# Patient Record
Sex: FEMALE | Race: WHITE | HISPANIC OR LATINO
[De-identification: names, ages, dates, MRNs, and addresses within clinical notes are randomized per-mention and may not be internally consistent; named-entity substitution may affect disease eponyms.]

---

## 2019-06-10 ENCOUNTER — RESULT REVIEW (OUTPATIENT)
Age: 64
End: 2019-06-10

## 2019-06-18 ENCOUNTER — APPOINTMENT (OUTPATIENT)
Dept: PULMONOLOGY | Facility: CLINIC | Age: 64
End: 2019-06-18

## 2019-07-09 ENCOUNTER — APPOINTMENT (OUTPATIENT)
Dept: PULMONOLOGY | Facility: CLINIC | Age: 64
End: 2019-07-09
Payer: COMMERCIAL

## 2019-07-09 ENCOUNTER — NON-APPOINTMENT (OUTPATIENT)
Age: 64
End: 2019-07-09

## 2019-07-09 VITALS
SYSTOLIC BLOOD PRESSURE: 142 MMHG | WEIGHT: 170 LBS | RESPIRATION RATE: 14 BRPM | DIASTOLIC BLOOD PRESSURE: 89 MMHG | BODY MASS INDEX: 31.28 KG/M2 | TEMPERATURE: 98.1 F | OXYGEN SATURATION: 97 % | HEIGHT: 62 IN | HEART RATE: 94 BPM

## 2019-07-09 PROCEDURE — 94060 EVALUATION OF WHEEZING: CPT

## 2019-07-09 PROCEDURE — 99204 OFFICE O/P NEW MOD 45 MIN: CPT | Mod: 25

## 2019-07-09 NOTE — PHYSICAL EXAM
[General Appearance - Well Developed] : well developed [Normal Appearance] : normal appearance [Well Groomed] : well groomed [General Appearance - Well Nourished] : well nourished [No Deformities] : no deformities [General Appearance - In No Acute Distress] : no acute distress [Normal Conjunctiva] : the conjunctiva exhibited no abnormalities [Eyelids - No Xanthelasma] : the eyelids demonstrated no xanthelasmas [Normal Oropharynx] : normal oropharynx [III] : III [Neck Appearance] : the appearance of the neck was normal [Jugular Venous Distention Increased] : there was no jugular-venous distention [Heart Sounds] : normal S1 and S2 [Edema] : no peripheral edema present [Exaggerated Use Of Accessory Muscles For Inspiration] : no accessory muscle use [Scattered Wheezes] : scattered wheezing [Bowel Sounds] : normal bowel sounds [Abdomen Soft] : soft [Abnormal Walk] : normal gait [Nail Clubbing] : no clubbing of the fingernails [Cyanosis, Localized] : no localized cyanosis [] : no rash [No Focal Deficits] : no focal deficits [Oriented To Time, Place, And Person] : oriented to person, place, and time [Impaired Insight] : insight and judgment were intact [Affect] : the affect was normal [Mood] : the mood was normal

## 2019-07-09 NOTE — HISTORY OF PRESENT ILLNESS
[FreeTextEntry1] : Patient is a 64-year-old female with past medical history significant for questionable asthma who is referred today for a pulmonary consult. 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 She presents today complaining of increasing cough shortness of breath and dyspnea on exertion. The patient is currently on Symbicort but is using it inappropriately. She complains of worsening cough over the last few months

## 2019-07-09 NOTE — REASON FOR VISIT
[Consultation] : a consultation visit [Asthma] : asthma [Cough] : cough [Shortness of Breath] : shortness of Breath [Sleep Apnea] : sleep apnea [Wheezing] : wheezing [Spouse] : spouse [FreeTextEntry1] : Patient is referred by Dr. aNpoles  for pulmonary consult

## 2019-07-09 NOTE — REVIEW OF SYSTEMS
[Cough] : cough [Sputum] : sputum  [Hemoptysis] : no hemoptysis [Dyspnea] : dyspnea [Chest Tightness] : no chest tightness [Pleuritic Pain] : no pleuritic pain [Frequent URIs] : no frequent upper respiratory infections [Wheezing] : wheezing [Snoring] : snoring [Witnessed Apneas] : demonstrated no ~M apnea [Nonrestorative Sleep] : nonrestorative sleep [Awakes With Headache] : awakes with a headache [Awakes With Dry Mouth] : awakes with dry mouth [Negative] : Pulmonary Hypertension

## 2019-07-09 NOTE — ASSESSMENT
[FreeTextEntry1] : In summary the patient is a 64-year-old female who presents today for a pulmonary consult. The patient's history and physical are consistent with obstructive sleep apnea and poorly controlled asthma. I have instructed patient on proper use of her long-acting beta agonist and inhaled corticosteroid.\par \par The patient underwent spirometry which revealed severe obstructive airways disease.\par \par In view of the patient's complaint of snoring, nonrestorative sleep and awakening with morning headaches a home sleep study has been ordered and the patient is instructed to followup in 6 weeks

## 2019-07-11 LAB — EOSINOPHIL # BLD MANUAL: 370 /UL

## 2019-07-22 LAB — TOTAL IGE SMQN RAST: 723 KU/L

## 2019-08-20 ENCOUNTER — APPOINTMENT (OUTPATIENT)
Dept: PULMONOLOGY | Facility: CLINIC | Age: 64
End: 2019-08-20
Payer: COMMERCIAL

## 2019-08-20 VITALS
OXYGEN SATURATION: 97 % | DIASTOLIC BLOOD PRESSURE: 86 MMHG | RESPIRATION RATE: 14 BRPM | BODY MASS INDEX: 31.83 KG/M2 | HEIGHT: 62 IN | SYSTOLIC BLOOD PRESSURE: 139 MMHG | WEIGHT: 173 LBS | HEART RATE: 106 BPM | TEMPERATURE: 98.1 F

## 2019-08-20 DIAGNOSIS — Z00.00 ENCOUNTER FOR GENERAL ADULT MEDICAL EXAMINATION W/OUT ABNORMAL FINDINGS: ICD-10-CM

## 2019-08-20 PROCEDURE — 99214 OFFICE O/P EST MOD 30 MIN: CPT

## 2019-08-20 RX ORDER — FLUTICASONE PROPIONATE AND SALMETEROL 250; 50 UG/1; UG/1
250-50 POWDER RESPIRATORY (INHALATION)
Qty: 60 | Refills: 0 | Status: ACTIVE | COMMUNITY
Start: 2019-05-15

## 2019-08-20 NOTE — HISTORY OF PRESENT ILLNESS
[FreeTextEntry1] : Patient is a 64-year-old female past medical history significant for active asthma who presents for a followup. The patient states that her cough shortness of breath and dyspnea on exertion have improved significantly with the use of her bronchodilator therapy. She denies fevers chills chest pain weight loss or hemoptysis

## 2019-08-20 NOTE — ASSESSMENT
[FreeTextEntry1] : In summary the patient is a 64-year-old female with past medical history significant for allergic asthma who presents for followup. The patient physical exam is significant for good air entry bilaterally. The patient is instructed to continue current medications and followup in 3 months.\par \par Prescription renewal performed

## 2019-08-20 NOTE — PHYSICAL EXAM
[General Appearance - Well Developed] : well developed [Normal Appearance] : normal appearance [General Appearance - Well Nourished] : well nourished [Well Groomed] : well groomed [No Deformities] : no deformities [General Appearance - In No Acute Distress] : no acute distress [Normal Conjunctiva] : the conjunctiva exhibited no abnormalities [Eyelids - No Xanthelasma] : the eyelids demonstrated no xanthelasmas [Normal Oropharynx] : normal oropharynx [III] : III [Neck Appearance] : the appearance of the neck was normal [Jugular Venous Distention Increased] : there was no jugular-venous distention [Heart Sounds] : normal S1 and S2 [Edema] : no peripheral edema present [Exaggerated Use Of Accessory Muscles For Inspiration] : no accessory muscle use [Auscultation Breath Sounds / Voice Sounds] : lungs were clear to auscultation bilaterally [Bowel Sounds] : normal bowel sounds [Abnormal Walk] : normal gait [Abdomen Soft] : soft [Cyanosis, Localized] : no localized cyanosis [Nail Clubbing] : no clubbing of the fingernails [] : no rash [No Focal Deficits] : no focal deficits [Oriented To Time, Place, And Person] : oriented to person, place, and time [Impaired Insight] : insight and judgment were intact [Affect] : the affect was normal [Mood] : the mood was normal

## 2019-08-20 NOTE — REASON FOR VISIT
[Follow-Up] : a follow-up visit [Asthma] : asthma [Shortness of Breath] : shortness of Breath [Cough] : cough

## 2020-08-24 ENCOUNTER — TRANSCRIPTION ENCOUNTER (OUTPATIENT)
Age: 65
End: 2020-08-24

## 2020-08-25 ENCOUNTER — INPATIENT (INPATIENT)
Facility: HOSPITAL | Age: 65
LOS: 2 days | Discharge: HOME | DRG: 661 | End: 2020-08-28
Attending: UROLOGY | Admitting: UROLOGY
Payer: COMMERCIAL

## 2020-08-25 VITALS
DIASTOLIC BLOOD PRESSURE: 87 MMHG | TEMPERATURE: 99 F | HEART RATE: 94 BPM | RESPIRATION RATE: 20 BRPM | OXYGEN SATURATION: 99 % | SYSTOLIC BLOOD PRESSURE: 137 MMHG | HEIGHT: 65 IN

## 2020-08-25 DIAGNOSIS — N23 UNSPECIFIED RENAL COLIC: ICD-10-CM

## 2020-08-25 LAB
ALBUMIN SERPL ELPH-MCNC: 3.4 G/DL — LOW (ref 3.5–5)
ALLERGY+IMMUNOLOGY DIAG STUDY NOTE: SIGNIFICANT CHANGE UP
ALP SERPL-CCNC: 92 U/L — SIGNIFICANT CHANGE UP (ref 40–120)
ALT FLD-CCNC: 30 U/L DA — SIGNIFICANT CHANGE UP (ref 10–60)
ANION GAP SERPL CALC-SCNC: 9 MMOL/L — SIGNIFICANT CHANGE UP (ref 5–17)
ANION GAP SERPL CALC-SCNC: 9 MMOL/L — SIGNIFICANT CHANGE UP (ref 5–17)
ANTIBODY INTERPRETATION 2: SIGNIFICANT CHANGE UP
APPEARANCE UR: CLEAR — SIGNIFICANT CHANGE UP
APTT BLD: 28.2 SEC — SIGNIFICANT CHANGE UP (ref 27.5–35.5)
AST SERPL-CCNC: 26 U/L — SIGNIFICANT CHANGE UP (ref 10–40)
BACTERIA # UR AUTO: ABNORMAL /HPF
BASOPHILS # BLD AUTO: 0.03 K/UL — SIGNIFICANT CHANGE UP (ref 0–0.2)
BASOPHILS NFR BLD AUTO: 0.2 % — SIGNIFICANT CHANGE UP (ref 0–2)
BILIRUB SERPL-MCNC: 0.4 MG/DL — SIGNIFICANT CHANGE UP (ref 0.2–1.2)
BILIRUB UR-MCNC: NEGATIVE — SIGNIFICANT CHANGE UP
BLD GP AB SCN SERPL QL: SIGNIFICANT CHANGE UP
BUN SERPL-MCNC: 41 MG/DL — HIGH (ref 7–18)
BUN SERPL-MCNC: 43 MG/DL — HIGH (ref 7–18)
CALCIUM SERPL-MCNC: 8.8 MG/DL — SIGNIFICANT CHANGE UP (ref 8.4–10.5)
CALCIUM SERPL-MCNC: 9.2 MG/DL — SIGNIFICANT CHANGE UP (ref 8.4–10.5)
CHLORIDE SERPL-SCNC: 101 MMOL/L — SIGNIFICANT CHANGE UP (ref 96–108)
CHLORIDE SERPL-SCNC: 99 MMOL/L — SIGNIFICANT CHANGE UP (ref 96–108)
CO2 SERPL-SCNC: 22 MMOL/L — SIGNIFICANT CHANGE UP (ref 22–31)
CO2 SERPL-SCNC: 23 MMOL/L — SIGNIFICANT CHANGE UP (ref 22–31)
COLOR SPEC: YELLOW — SIGNIFICANT CHANGE UP
CREAT SERPL-MCNC: 7.72 MG/DL — HIGH (ref 0.5–1.3)
CREAT SERPL-MCNC: 7.73 MG/DL — HIGH (ref 0.5–1.3)
DIFF PNL FLD: ABNORMAL
EOSINOPHIL # BLD AUTO: 0.08 K/UL — SIGNIFICANT CHANGE UP (ref 0–0.5)
EOSINOPHIL NFR BLD AUTO: 0.6 % — SIGNIFICANT CHANGE UP (ref 0–6)
EPI CELLS # UR: ABNORMAL /HPF
GLUCOSE SERPL-MCNC: 84 MG/DL — SIGNIFICANT CHANGE UP (ref 70–99)
GLUCOSE SERPL-MCNC: 86 MG/DL — SIGNIFICANT CHANGE UP (ref 70–99)
GLUCOSE UR QL: NEGATIVE — SIGNIFICANT CHANGE UP
HCT VFR BLD CALC: 40.1 % — SIGNIFICANT CHANGE UP (ref 34.5–45)
HGB BLD-MCNC: 13.5 G/DL — SIGNIFICANT CHANGE UP (ref 11.5–15.5)
IMM GRANULOCYTES NFR BLD AUTO: 0.4 % — SIGNIFICANT CHANGE UP (ref 0–1.5)
INR BLD: 1.02 RATIO — SIGNIFICANT CHANGE UP (ref 0.88–1.16)
KETONES UR-MCNC: NEGATIVE — SIGNIFICANT CHANGE UP
LEUKOCYTE ESTERASE UR-ACNC: ABNORMAL
LIDOCAIN IGE QN: 102 U/L — SIGNIFICANT CHANGE UP (ref 73–393)
LYMPHOCYTES # BLD AUTO: 16.7 % — SIGNIFICANT CHANGE UP (ref 13–44)
LYMPHOCYTES # BLD AUTO: 2.26 K/UL — SIGNIFICANT CHANGE UP (ref 1–3.3)
MCHC RBC-ENTMCNC: 32.1 PG — SIGNIFICANT CHANGE UP (ref 27–34)
MCHC RBC-ENTMCNC: 33.7 GM/DL — SIGNIFICANT CHANGE UP (ref 32–36)
MCV RBC AUTO: 95.2 FL — SIGNIFICANT CHANGE UP (ref 80–100)
MONOCYTES # BLD AUTO: 1.13 K/UL — HIGH (ref 0–0.9)
MONOCYTES NFR BLD AUTO: 8.4 % — SIGNIFICANT CHANGE UP (ref 2–14)
NEUTROPHILS # BLD AUTO: 9.97 K/UL — HIGH (ref 1.8–7.4)
NEUTROPHILS NFR BLD AUTO: 73.7 % — SIGNIFICANT CHANGE UP (ref 43–77)
NITRITE UR-MCNC: NEGATIVE — SIGNIFICANT CHANGE UP
NRBC # BLD: 0 /100 WBCS — SIGNIFICANT CHANGE UP (ref 0–0)
PH UR: 7 — SIGNIFICANT CHANGE UP (ref 5–8)
PLATELET # BLD AUTO: 231 K/UL — SIGNIFICANT CHANGE UP (ref 150–400)
POTASSIUM SERPL-MCNC: 4.5 MMOL/L — SIGNIFICANT CHANGE UP (ref 3.5–5.3)
POTASSIUM SERPL-MCNC: 5.1 MMOL/L — SIGNIFICANT CHANGE UP (ref 3.5–5.3)
POTASSIUM SERPL-SCNC: 4.5 MMOL/L — SIGNIFICANT CHANGE UP (ref 3.5–5.3)
POTASSIUM SERPL-SCNC: 5.1 MMOL/L — SIGNIFICANT CHANGE UP (ref 3.5–5.3)
PROT SERPL-MCNC: 7.8 G/DL — SIGNIFICANT CHANGE UP (ref 6–8.3)
PROT UR-MCNC: NEGATIVE — SIGNIFICANT CHANGE UP
PROTHROM AB SERPL-ACNC: 11.9 SEC — SIGNIFICANT CHANGE UP (ref 10.6–13.6)
RBC # BLD: 4.21 M/UL — SIGNIFICANT CHANGE UP (ref 3.8–5.2)
RBC # FLD: 12.1 % — SIGNIFICANT CHANGE UP (ref 10.3–14.5)
RBC CASTS # UR COMP ASSIST: ABNORMAL /HPF (ref 0–2)
SARS-COV-2 RNA SPEC QL NAA+PROBE: SIGNIFICANT CHANGE UP
SODIUM SERPL-SCNC: 131 MMOL/L — LOW (ref 135–145)
SODIUM SERPL-SCNC: 132 MMOL/L — LOW (ref 135–145)
SP GR SPEC: 1.01 — SIGNIFICANT CHANGE UP (ref 1.01–1.02)
UROBILINOGEN FLD QL: NEGATIVE — SIGNIFICANT CHANGE UP
WBC # BLD: 13.53 K/UL — HIGH (ref 3.8–10.5)
WBC # FLD AUTO: 13.53 K/UL — HIGH (ref 3.8–10.5)
WBC UR QL: ABNORMAL /HPF (ref 0–5)

## 2020-08-25 PROCEDURE — 74420 UROGRAPHY RTRGR +-KUB: CPT | Mod: 26

## 2020-08-25 PROCEDURE — 74176 CT ABD & PELVIS W/O CONTRAST: CPT | Mod: 26

## 2020-08-25 PROCEDURE — 99285 EMERGENCY DEPT VISIT HI MDM: CPT | Mod: 25

## 2020-08-25 PROCEDURE — 71046 X-RAY EXAM CHEST 2 VIEWS: CPT | Mod: 26

## 2020-08-25 RX ORDER — ONDANSETRON 8 MG/1
4 TABLET, FILM COATED ORAL EVERY 6 HOURS
Refills: 0 | Status: DISCONTINUED | OUTPATIENT
Start: 2020-08-25 | End: 2020-08-28

## 2020-08-25 RX ORDER — SODIUM CHLORIDE 9 MG/ML
1000 INJECTION, SOLUTION INTRAVENOUS
Refills: 0 | Status: DISCONTINUED | OUTPATIENT
Start: 2020-08-25 | End: 2020-08-28

## 2020-08-25 RX ORDER — SODIUM CHLORIDE 9 MG/ML
1000 INJECTION, SOLUTION INTRAVENOUS ONCE
Refills: 0 | Status: COMPLETED | OUTPATIENT
Start: 2020-08-25 | End: 2020-08-25

## 2020-08-25 RX ORDER — ENOXAPARIN SODIUM 100 MG/ML
30 INJECTION SUBCUTANEOUS DAILY
Refills: 0 | Status: DISCONTINUED | OUTPATIENT
Start: 2020-08-25 | End: 2020-08-26

## 2020-08-25 RX ORDER — CEFTRIAXONE 500 MG/1
1000 INJECTION, POWDER, FOR SOLUTION INTRAMUSCULAR; INTRAVENOUS EVERY 24 HOURS
Refills: 0 | Status: DISCONTINUED | OUTPATIENT
Start: 2020-08-25 | End: 2020-08-28

## 2020-08-25 RX ORDER — HEPARIN SODIUM 5000 [USP'U]/ML
5000 INJECTION INTRAVENOUS; SUBCUTANEOUS EVERY 8 HOURS
Refills: 0 | Status: DISCONTINUED | OUTPATIENT
Start: 2020-08-25 | End: 2020-08-25

## 2020-08-25 RX ORDER — HYDROMORPHONE HYDROCHLORIDE 2 MG/ML
0.5 INJECTION INTRAMUSCULAR; INTRAVENOUS; SUBCUTANEOUS
Refills: 0 | Status: DISCONTINUED | OUTPATIENT
Start: 2020-08-25 | End: 2020-08-25

## 2020-08-25 RX ORDER — SODIUM CHLORIDE 9 MG/ML
1000 INJECTION INTRAMUSCULAR; INTRAVENOUS; SUBCUTANEOUS
Refills: 0 | Status: DISCONTINUED | OUTPATIENT
Start: 2020-08-25 | End: 2020-08-25

## 2020-08-25 RX ORDER — HYDROMORPHONE HYDROCHLORIDE 2 MG/ML
1 INJECTION INTRAMUSCULAR; INTRAVENOUS; SUBCUTANEOUS
Refills: 0 | Status: DISCONTINUED | OUTPATIENT
Start: 2020-08-25 | End: 2020-08-25

## 2020-08-25 RX ORDER — CEFTRIAXONE 500 MG/1
1000 INJECTION, POWDER, FOR SOLUTION INTRAMUSCULAR; INTRAVENOUS ONCE
Refills: 0 | Status: COMPLETED | OUTPATIENT
Start: 2020-08-25 | End: 2020-08-25

## 2020-08-25 RX ORDER — HYDROMORPHONE HYDROCHLORIDE 2 MG/ML
1 INJECTION INTRAMUSCULAR; INTRAVENOUS; SUBCUTANEOUS EVERY 4 HOURS
Refills: 0 | Status: DISCONTINUED | OUTPATIENT
Start: 2020-08-25 | End: 2020-08-25

## 2020-08-25 RX ADMIN — SODIUM CHLORIDE 1000 MILLILITER(S): 9 INJECTION, SOLUTION INTRAVENOUS at 22:49

## 2020-08-25 RX ADMIN — CEFTRIAXONE 100 MILLIGRAM(S): 500 INJECTION, POWDER, FOR SOLUTION INTRAMUSCULAR; INTRAVENOUS at 13:28

## 2020-08-25 NOTE — ED PROVIDER NOTE - CARE PLAN
Principal Discharge DX:	Renal colic, bilateral  Secondary Diagnosis:	Acute renal failure superimposed on chronic kidney disease, unspecified CKD stage, unspecified acute renal failure type  Secondary Diagnosis:	Acute pyelonephritis

## 2020-08-25 NOTE — ED PROVIDER NOTE - SECONDARY DIAGNOSIS.
Acute renal failure superimposed on chronic kidney disease, unspecified CKD stage, unspecified acute renal failure type Acute pyelonephritis

## 2020-08-25 NOTE — ED PROVIDER NOTE - ATTENDING CONTRIBUTION TO CARE
patient with 2 days of right flank pain, no fever no vomiting. on exam, mild left costovertebral angle tenderness. labs reveal ARF, CT with obstructed stone. admit to urology for OR

## 2020-08-25 NOTE — ED ADULT NURSE NOTE - NSIMPLEMENTINTERV_GEN_ALL_ED
Implemented All Universal Safety Interventions:  [place] to call system. Call bell, personal items and telephone within reach. Instruct patient to call for assistance. Room bathroom lighting operational. Non-slip footwear when patient is off stretcher. Physically safe environment: no spills, clutter or unnecessary equipment. Stretcher in lowest position, wheels locked, appropriate side rails in place.

## 2020-08-25 NOTE — ED PROVIDER NOTE - PROGRESS NOTE DETAILS
CT shows R UPJ 10 mm stone and L sided mid-ureteral stone with L renal atrophy. Creat 7.73/BUN 41 likely post-renal failure. UA shows signs of infection. Urine and blood cultures sent. Will give Ceftriaxone. Urology consulted. NE Sebastian evaluated patient. Will admit for OR. Patient is NPO and aware of findings and plan.

## 2020-08-25 NOTE — H&P ADULT - NSHPPHYSICALEXAM_GEN_ALL_CORE
ICU Vital Signs Last 24 Hrs  T(C): 36.9 (25 Aug 2020 13:33), Max: 37.1 (25 Aug 2020 10:32)  T(F): 98.4 (25 Aug 2020 13:33), Max: 98.7 (25 Aug 2020 10:32)  HR: 78 (25 Aug 2020 13:33) (78 - 94)  BP: 121/83 (25 Aug 2020 13:33) (121/83 - 137/87)  BP(mean): --  ABP: --  ABP(mean): --  RR: 20 (25 Aug 2020 13:33) (20 - 20)  SpO2: 99% (25 Aug 2020 13:33) (99% - 99%)    Alert nad  Abd: soft nt nd  right cvat  EXT: no cce  Lungs: cta  cardiac s1s2 no murmers

## 2020-08-25 NOTE — H&P ADULT - HISTORY OF PRESENT ILLNESS
66yo f pmhx htn c/o right flank pain x 2 days, associated nausea no vomiting.  Pt denies fever, no hematuria, or dysuria . Pt states she has had urolithiasis for years with intermitent flank pain but worse past 2 days.  No cp no sob. no recent wt loss.

## 2020-08-25 NOTE — ED PROVIDER NOTE - OBJECTIVE STATEMENT
65 year-old female, history of HTN, HLD, renal stones, presents with cc R flank pain x 2 days. Gradual onset of intermittent sharp pain to R flank area. Pain is moderate, moderately relieved with Tylenol. No aggravating factors. Associated with nausea and chills. Denies any fever, V/D/C, urinary symptoms, back pain, abdominal pain or any other complaints.

## 2020-08-25 NOTE — ED PROVIDER NOTE - CLINICAL SUMMARY MEDICAL DECISION MAKING FREE TEXT BOX
CT shows R UPJ 10 mm stone and L sided mid-ureteral stone with L renal atrophy. Creat 7.73/BUN 41 likely post-renal failure. UA shows signs of infection. Urine and blood cultures sent. Will give Ceftriaxone. Urology consulted. NE Sebastian evaluated patient. Will admit for OR.

## 2020-08-25 NOTE — H&P ADULT - NSHPLABSRESULTS_GEN_ALL_CORE
13.5   13.53 )-----------( 231      ( 25 Aug 2020 11:33 )             40.1     08-25    132<L>  |  101  |  43<H>  ----------------------------<  84  4.5   |  22  |  7.72<H>    Ca    8.8      25 Aug 2020 13:28    TPro  7.8  /  Alb  3.4<L>  /  TBili  0.4  /  DBili  x   /  AST  26  /  ALT  30  /  AlkPhos  92  08-25    < from: CT Abdomen and Pelvis No Cont (08.25.20 @ 12:32) >    Impression:    10 mm calculus at the right UPJ with associated moderate right hydronephrosis.    9 mm left distal ureteral calculus with associated mild left hydronephrosis. The left kidney is atrophic.    Possible small masses in the myometrium, representing possible uterine fibroids. If clinically indicated, pelvic ultrasound may be pursued for further evaluation.    Trace pelvic free fluid.    < end of copied text >

## 2020-08-26 LAB
ANION GAP SERPL CALC-SCNC: 2 MMOL/L — LOW (ref 5–17)
ANION GAP SERPL CALC-SCNC: 5 MMOL/L — SIGNIFICANT CHANGE UP (ref 5–17)
BUN SERPL-MCNC: 29 MG/DL — HIGH (ref 7–18)
BUN SERPL-MCNC: 35 MG/DL — HIGH (ref 7–18)
CALCIUM SERPL-MCNC: 8.5 MG/DL — SIGNIFICANT CHANGE UP (ref 8.4–10.5)
CALCIUM SERPL-MCNC: 9 MG/DL — SIGNIFICANT CHANGE UP (ref 8.4–10.5)
CHLORIDE SERPL-SCNC: 113 MMOL/L — HIGH (ref 96–108)
CHLORIDE SERPL-SCNC: 113 MMOL/L — HIGH (ref 96–108)
CO2 SERPL-SCNC: 24 MMOL/L — SIGNIFICANT CHANGE UP (ref 22–31)
CO2 SERPL-SCNC: 27 MMOL/L — SIGNIFICANT CHANGE UP (ref 22–31)
CREAT SERPL-MCNC: 2.03 MG/DL — HIGH (ref 0.5–1.3)
CREAT SERPL-MCNC: 3.76 MG/DL — HIGH (ref 0.5–1.3)
CULTURE RESULTS: SIGNIFICANT CHANGE UP
CULTURE RESULTS: SIGNIFICANT CHANGE UP
GLUCOSE SERPL-MCNC: 148 MG/DL — HIGH (ref 70–99)
GLUCOSE SERPL-MCNC: 80 MG/DL — SIGNIFICANT CHANGE UP (ref 70–99)
HCT VFR BLD CALC: 36.5 % — SIGNIFICANT CHANGE UP (ref 34.5–45)
HCV AB S/CO SERPL IA: 0.14 S/CO — SIGNIFICANT CHANGE UP (ref 0–0.99)
HCV AB SERPL-IMP: SIGNIFICANT CHANGE UP
HGB BLD-MCNC: 12.4 G/DL — SIGNIFICANT CHANGE UP (ref 11.5–15.5)
MAGNESIUM SERPL-MCNC: 2.7 MG/DL — HIGH (ref 1.6–2.6)
MCHC RBC-ENTMCNC: 32.2 PG — SIGNIFICANT CHANGE UP (ref 27–34)
MCHC RBC-ENTMCNC: 34 GM/DL — SIGNIFICANT CHANGE UP (ref 32–36)
MCV RBC AUTO: 94.8 FL — SIGNIFICANT CHANGE UP (ref 80–100)
NRBC # BLD: 0 /100 WBCS — SIGNIFICANT CHANGE UP (ref 0–0)
PHOSPHATE SERPL-MCNC: 5 MG/DL — HIGH (ref 2.5–4.5)
PLATELET # BLD AUTO: 226 K/UL — SIGNIFICANT CHANGE UP (ref 150–400)
POTASSIUM SERPL-MCNC: 4.2 MMOL/L — SIGNIFICANT CHANGE UP (ref 3.5–5.3)
POTASSIUM SERPL-MCNC: 4.6 MMOL/L — SIGNIFICANT CHANGE UP (ref 3.5–5.3)
POTASSIUM SERPL-SCNC: 4.2 MMOL/L — SIGNIFICANT CHANGE UP (ref 3.5–5.3)
POTASSIUM SERPL-SCNC: 4.6 MMOL/L — SIGNIFICANT CHANGE UP (ref 3.5–5.3)
RBC # BLD: 3.85 M/UL — SIGNIFICANT CHANGE UP (ref 3.8–5.2)
RBC # FLD: 12.3 % — SIGNIFICANT CHANGE UP (ref 10.3–14.5)
SARS-COV-2 IGG SERPL QL IA: NEGATIVE — SIGNIFICANT CHANGE UP
SARS-COV-2 IGM SERPL IA-ACNC: 0.35 INDEX — SIGNIFICANT CHANGE UP
SODIUM SERPL-SCNC: 142 MMOL/L — SIGNIFICANT CHANGE UP (ref 135–145)
SODIUM SERPL-SCNC: 142 MMOL/L — SIGNIFICANT CHANGE UP (ref 135–145)
SPECIMEN SOURCE: SIGNIFICANT CHANGE UP
SPECIMEN SOURCE: SIGNIFICANT CHANGE UP
WBC # BLD: 10.3 K/UL — SIGNIFICANT CHANGE UP (ref 3.8–10.5)
WBC # FLD AUTO: 10.3 K/UL — SIGNIFICANT CHANGE UP (ref 3.8–10.5)

## 2020-08-26 RX ORDER — HEPARIN SODIUM 5000 [USP'U]/ML
5000 INJECTION INTRAVENOUS; SUBCUTANEOUS EVERY 8 HOURS
Refills: 0 | Status: DISCONTINUED | OUTPATIENT
Start: 2020-08-26 | End: 2020-08-28

## 2020-08-26 RX ADMIN — HEPARIN SODIUM 5000 UNIT(S): 5000 INJECTION INTRAVENOUS; SUBCUTANEOUS at 13:13

## 2020-08-26 RX ADMIN — HEPARIN SODIUM 5000 UNIT(S): 5000 INJECTION INTRAVENOUS; SUBCUTANEOUS at 05:32

## 2020-08-26 RX ADMIN — HEPARIN SODIUM 5000 UNIT(S): 5000 INJECTION INTRAVENOUS; SUBCUTANEOUS at 21:19

## 2020-08-26 RX ADMIN — CEFTRIAXONE 100 MILLIGRAM(S): 500 INJECTION, POWDER, FOR SOLUTION INTRAMUSCULAR; INTRAVENOUS at 05:31

## 2020-08-27 RX ADMIN — CEFTRIAXONE 100 MILLIGRAM(S): 500 INJECTION, POWDER, FOR SOLUTION INTRAMUSCULAR; INTRAVENOUS at 06:02

## 2020-08-27 RX ADMIN — HEPARIN SODIUM 5000 UNIT(S): 5000 INJECTION INTRAVENOUS; SUBCUTANEOUS at 06:02

## 2020-08-27 RX ADMIN — HEPARIN SODIUM 5000 UNIT(S): 5000 INJECTION INTRAVENOUS; SUBCUTANEOUS at 23:34

## 2020-08-28 ENCOUNTER — TRANSCRIPTION ENCOUNTER (OUTPATIENT)
Age: 65
End: 2020-08-28

## 2020-08-28 VITALS
DIASTOLIC BLOOD PRESSURE: 83 MMHG | OXYGEN SATURATION: 99 % | RESPIRATION RATE: 17 BRPM | TEMPERATURE: 98 F | SYSTOLIC BLOOD PRESSURE: 121 MMHG | HEART RATE: 68 BPM

## 2020-08-28 LAB
ANION GAP SERPL CALC-SCNC: 4 MMOL/L — LOW (ref 5–17)
BUN SERPL-MCNC: 10 MG/DL — SIGNIFICANT CHANGE UP (ref 7–18)
CALCIUM SERPL-MCNC: 9 MG/DL — SIGNIFICANT CHANGE UP (ref 8.4–10.5)
CHLORIDE SERPL-SCNC: 109 MMOL/L — HIGH (ref 96–108)
CO2 SERPL-SCNC: 29 MMOL/L — SIGNIFICANT CHANGE UP (ref 22–31)
CREAT SERPL-MCNC: 1.21 MG/DL — SIGNIFICANT CHANGE UP (ref 0.5–1.3)
CULTURE RESULTS: NO GROWTH — SIGNIFICANT CHANGE UP
GLUCOSE SERPL-MCNC: 93 MG/DL — SIGNIFICANT CHANGE UP (ref 70–99)
HCT VFR BLD CALC: 39.4 % — SIGNIFICANT CHANGE UP (ref 34.5–45)
HGB BLD-MCNC: 12.7 G/DL — SIGNIFICANT CHANGE UP (ref 11.5–15.5)
MCHC RBC-ENTMCNC: 31.8 PG — SIGNIFICANT CHANGE UP (ref 27–34)
MCHC RBC-ENTMCNC: 32.2 GM/DL — SIGNIFICANT CHANGE UP (ref 32–36)
MCV RBC AUTO: 98.5 FL — SIGNIFICANT CHANGE UP (ref 80–100)
NRBC # BLD: 0 /100 WBCS — SIGNIFICANT CHANGE UP (ref 0–0)
PLATELET # BLD AUTO: 236 K/UL — SIGNIFICANT CHANGE UP (ref 150–400)
POTASSIUM SERPL-MCNC: 4.2 MMOL/L — SIGNIFICANT CHANGE UP (ref 3.5–5.3)
POTASSIUM SERPL-SCNC: 4.2 MMOL/L — SIGNIFICANT CHANGE UP (ref 3.5–5.3)
RBC # BLD: 4 M/UL — SIGNIFICANT CHANGE UP (ref 3.8–5.2)
RBC # FLD: 12.6 % — SIGNIFICANT CHANGE UP (ref 10.3–14.5)
SODIUM SERPL-SCNC: 142 MMOL/L — SIGNIFICANT CHANGE UP (ref 135–145)
SPECIMEN SOURCE: SIGNIFICANT CHANGE UP
WBC # BLD: 9.78 K/UL — SIGNIFICANT CHANGE UP (ref 3.8–10.5)
WBC # FLD AUTO: 9.78 K/UL — SIGNIFICANT CHANGE UP (ref 3.8–10.5)

## 2020-08-28 RX ORDER — CEPHALEXIN 500 MG
1 CAPSULE ORAL
Qty: 6 | Refills: 0
Start: 2020-08-28 | End: 2020-08-30

## 2020-08-28 RX ADMIN — HEPARIN SODIUM 5000 UNIT(S): 5000 INJECTION INTRAVENOUS; SUBCUTANEOUS at 05:52

## 2020-08-28 RX ADMIN — CEFTRIAXONE 100 MILLIGRAM(S): 500 INJECTION, POWDER, FOR SOLUTION INTRAMUSCULAR; INTRAVENOUS at 05:53

## 2020-08-28 RX ADMIN — SODIUM CHLORIDE 75 MILLILITER(S): 9 INJECTION, SOLUTION INTRAVENOUS at 05:56

## 2020-08-28 NOTE — PROGRESS NOTE ADULT - ASSESSMENT
65F p/w b/l ureteral stones and hydronephrosis s/p cystoscopy and b/l stent placement POD#3  Cr wnl. Voiding spontaneously    -continue antibiotics  -Reg diet  -OOB  -d/c planning
64 y/o Female s/p ureteral stent placements 8/25, Cr levels trending down     -C/w Anderson catheter   -Monitor Cr levels   -C/w IV abx   -Reg diet   -OOB/Ambulate   -DVT ppx
s/p ureteral stent placements  post op stable    trend labs  pt on abx  observation r/o post obstructive diuresis

## 2020-08-28 NOTE — DISCHARGE NOTE PROVIDER - HOSPITAL COURSE
HPI:    64yo f pmhx htn c/o right flank pain x 2 days, associated nausea no vomiting.  Pt denies fever, no hematuria, or dysuria . Pt states she has had urolithiasis for years with intermitent flank pain but worse past 2 days.  No cp no sob. no recent wt loss. (25 Aug 2020 15:11) Patient was found to have bilateral ureteral stone associated with hydronephrosis. Patient was taken to the OR for cystoscopy and bilateral stent placement on 8/25 with no complications. Patient is now stable, tolerating diet, pain is well controlled, and is voiding spontaneosuly. Patient is cleared for discharge and is to follow up with Dr. Batista.

## 2020-08-28 NOTE — DISCHARGE NOTE PROVIDER - NSDCCPTREATMENT_GEN_ALL_CORE_FT
PRINCIPAL PROCEDURE  Procedure: Cystoscopy  Findings and Treatment: You may take over the counter medications such as tylenol or advil for mild pain as needed. Take narcotic pain medication for severe pain. Call for any fever over 101, nausea, vomiting, or severe pain. Please follow up with Dr. Batista for stent removal.      SECONDARY PROCEDURE  Procedure: Cystoscopic placement of bilateral ureteral stents  Findings and Treatment:

## 2020-08-28 NOTE — PROGRESS NOTE ADULT - SUBJECTIVE AND OBJECTIVE BOX
INTERVAL HPI/OVERNIGHT EVENTS:    Pt seen and examined at bedside. Offers no acute complaints at this time; no abd/ back pain, no nausea/ vomiting, no fever, chills, SOB or CP     Vital Signs Last 24 Hrs  T(C): 37.1 (27 Aug 2020 05:50), Max: 37.4 (26 Aug 2020 14:39)  T(F): 98.7 (27 Aug 2020 05:50), Max: 99.3 (26 Aug 2020 14:39)  HR: 72 (27 Aug 2020 05:50) (72 - 75)  BP: 125/76 (27 Aug 2020 05:50) (100/83 - 125/76)  BP(mean): --  RR: 16 (27 Aug 2020 05:50) (16 - 16)  SpO2: 95% (27 Aug 2020 05:50) (95% - 97%)  I&O's Detail    26 Aug 2020 07:01  -  27 Aug 2020 07:00  --------------------------------------------------------  IN:    dextrose 5% + sodium chloride 0.45%.: 1600 mL  Total IN: 1600 mL    OUT:    Indwelling Catheter - Urethral: 2700 mL  Total OUT: 2700 mL    Total NET: -1100 mL        cefTRIAXone   IVPB 1000 milliGRAM(s) IV Intermittent every 24 hours      Physical Exam  General: AAOx3, No acute distress  Skin: No jaundice, no icterus  Abdomen: soft,  nondistended, nontender, no rebound tenderness, no guarding, no palpable masses  : Normal external genitalia, osborn catheter in place, UO blood tinged, no clots seen   Extremities: non edematous, no calf pain bilaterally      Labs:                        12.4   10.30 )-----------( 226      ( 26 Aug 2020 07:22 )             36.5     08-27    141  |  111<H>  |  20<H>  ----------------------------<  96  4.4   |  28  |  1.36<H>    Ca    8.6      27 Aug 2020 07:10  Phos  5.0     08-26  Mg     2.7     08-26    TPro  7.8  /  Alb  3.4<L>  /  TBili  0.4  /  DBili  x   /  AST  26  /  ALT  30  /  AlkPhos  92  08-25    PT/INR - ( 25 Aug 2020 13:28 )   PT: 11.9 sec;   INR: 1.02 ratio         PTT - ( 25 Aug 2020 13:28 )  PTT:28.2 sec
Pt s- complaints  ICU Vital Signs Last 24 Hrs  T(C): 36.9 (26 Aug 2020 05:03), Max: 37.2 (25 Aug 2020 21:41)  T(F): 98.5 (26 Aug 2020 05:03), Max: 98.9 (25 Aug 2020 21:41)  HR: 77 (26 Aug 2020 05:03) (65 - 94)  BP: 111/74 (26 Aug 2020 05:03) (111/74 - 140/87)  BP(mean): 90 (25 Aug 2020 22:41) (87 - 94)  ABP: --  ABP(mean): --  RR: 17 (26 Aug 2020 05:03) (16 - 20)  SpO2: 95% (26 Aug 2020 05:03) (95% - 100%)  Alert nad  Abd: soft nt nd no cvat    u/o approx 100cc/hr                        12.4   10.30 )-----------( 226      ( 26 Aug 2020 07:22 )             36.5     08-26    142  |  113<H>  |  35<H>  ----------------------------<  80  4.2   |  24  |  3.76<H>    Ca    9.0      26 Aug 2020 07:22  Phos  5.0     08-26  Mg     2.7     08-26    TPro  7.8  /  Alb  3.4<L>  /  TBili  0.4  /  DBili  x   /  AST  26  /  ALT  30  /  AlkPhos  92  08-25
Subjective & Objective   65y old, female, s/p ureteral stent placement 8/25. POD #3.  Seen at bedside. Denies nausea, chest pain, fevers and chill. Pain is well controlled, had bowel movement last night. The osborn was removed last night and was able to void.     INTERVAL HPI/OVERNIGHT EVENTS:    Vital Signs Last 24 Hrs  T(C): 36.5 (28 Aug 2020 05:06), Max: 36.9 (27 Aug 2020 20:51)  T(F): 97.7 (28 Aug 2020 05:06), Max: 98.5 (27 Aug 2020 20:51)  HR: 68 (28 Aug 2020 05:06) (67 - 73)  BP: 121/83 (28 Aug 2020 05:06) (110/84 - 139/86)  BP(mean): --  RR: 17 (28 Aug 2020 05:06) (16 - 17)  SpO2: 99% (28 Aug 2020 05:06) (98% - 100%)    Physical Exam:    Gen: Well nourished, mild distress   HEENT: normocephalic atraumatic,   Chest: RRR  Abd: Normal bowel sound, no tenderness   Pelvic: no tenderness   Ext: normal ROM      MEDICATIONS  (STANDING):  cefTRIAXone   IVPB 1000 milliGRAM(s) IV Intermittent every 24 hours  dextrose 5% + sodium chloride 0.45%. 1000 milliLiter(s) (75 mL/Hr) IV Continuous <Continuous>  heparin   Injectable 5000 Unit(s) SubCutaneous every 8 hours    MEDICATIONS  (PRN):  ondansetron Injectable 4 milliGRAM(s) IV Push every 6 hours PRN Nausea      Labs:                          12.7   9.78  )-----------( 236      ( 28 Aug 2020 07:47 )             39.4     08-28    142  |  109<H>  |  10  ----------------------------<  93  4.2   |  29  |  1.21    Ca    9.0      28 Aug 2020 07:47          I&O's Detail    27 Aug 2020 07:01  -  28 Aug 2020 07:00  --------------------------------------------------------  IN:  Total IN: 0 mL    OUT:    Indwelling Catheter - Urethral: 1000 mL    Voided: 400 mL  Total OUT: 1400 mL    Total NET: -1400 mL    Assessment:  66 y/o female, POD #3 for ureteral stent placement, Cr level trending down. Osborn removed, able to void and had BM.     Plan:   Monitor Cr levels   Reg diet   DVT ppx  C/w IV abx  Discharge
Subjective:    65y old, female, s/p ureteral stent placement 8/25. POD #2.  Seen at bedside. Denies nausea, chest pain, fevers and chill. Pain is well controlled, no bowel movement, passed flatulence in the morning. The osborn had reddish tint.     INTERVAL HPI/OVERNIGHT EVENTS:    Vital Signs Last 24 Hrs  T(C): 36.4 (27 Aug 2020 14:35), Max: 37.1 (27 Aug 2020 05:50)  T(F): 97.6 (27 Aug 2020 14:35), Max: 98.7 (27 Aug 2020 05:50)  HR: 73 (27 Aug 2020 14:35) (72 - 73)  BP: 110/84 (27 Aug 2020 14:35) (110/84 - 125/76)  BP(mean): --  RR: 16 (27 Aug 2020 14:35) (16 - 16)  SpO2: 100% (27 Aug 2020 14:35) (95% - 100%)    Physical Exam:    Gen: well nourished, mild distress.   Chest: RRR  Abd: normal sound    MEDICATIONS  (STANDING):  cefTRIAXone   IVPB 1000 milliGRAM(s) IV Intermittent every 24 hours  dextrose 5% + sodium chloride 0.45%. 1000 milliLiter(s) (75 mL/Hr) IV Continuous <Continuous>  heparin   Injectable 5000 Unit(s) SubCutaneous every 8 hours    MEDICATIONS  (PRN):  ondansetron Injectable 4 milliGRAM(s) IV Push every 6 hours PRN Nausea      Labs:                          12.4   10.30 )-----------( 226      ( 26 Aug 2020 07:22 )             36.5     08-27    141  |  111<H>  |  20<H>  ----------------------------<  96  4.4   |  28  |  1.36<H>    Ca    8.6      27 Aug 2020 07:10  Phos  5.0     08-26  Mg     2.7     08-26          I&O's Detail    26 Aug 2020 07:01  -  27 Aug 2020 07:00  --------------------------------------------------------  IN:    dextrose 5% + sodium chloride 0.45%.: 1600 mL  Total IN: 1600 mL    OUT:    Indwelling Catheter - Urethral: 2700 mL  Total OUT: 2700 mL    Total NET: -1100 mL      27 Aug 2020 07:01  -  27 Aug 2020 16:02  --------------------------------------------------------  IN:  Total IN: 0 mL    OUT:    Indwelling Catheter - Urethral: 1000 mL    Voided: 400 mL  Total OUT: 1400 mL    Total NET: -1400 mL    Assessment:  64 yo female POD #2, ureteral stent placement 8/25, Cr level trending down, osborn had reddish tint.     Plan:  1. Monitor Cr level  2. C/w IV abx  3. DVT ppx  4. Reg diet   5. Monitor Osborn
INTERVAL HPI/OVERNIGHT EVENTS:  Pt resting comfortably. No acute complaints.   Voiding spontaneously  Denies N/V    MEDICATIONS  (STANDING):  cefTRIAXone   IVPB 1000 milliGRAM(s) IV Intermittent every 24 hours  dextrose 5% + sodium chloride 0.45%. 1000 milliLiter(s) (75 mL/Hr) IV Continuous <Continuous>  heparin   Injectable 5000 Unit(s) SubCutaneous every 8 hours    MEDICATIONS  (PRN):  ondansetron Injectable 4 milliGRAM(s) IV Push every 6 hours PRN Nausea    Vital Signs Last 24 Hrs  T(C): 36.5 (28 Aug 2020 05:06), Max: 36.9 (27 Aug 2020 20:51)  T(F): 97.7 (28 Aug 2020 05:06), Max: 98.5 (27 Aug 2020 20:51)  HR: 68 (28 Aug 2020 05:06) (67 - 73)  BP: 121/83 (28 Aug 2020 05:06) (110/84 - 139/86)  BP(mean): --  RR: 17 (28 Aug 2020 05:06) (16 - 17)  SpO2: 99% (28 Aug 2020 05:06) (98% - 100%)    Physical:  General: A&Ox3. NAD.  Resp: breathing unlabored  Abdomen: Soft nondistended, nontender.  Ext: no edema    I&O's Detail    27 Aug 2020 07:01  -  28 Aug 2020 07:00  --------------------------------------------------------  IN:  Total IN: 0 mL    OUT:    Indwelling Catheter - Urethral: 1000 mL    Voided: 400 mL  Total OUT: 1400 mL    Total NET: -1400 mL      LABS:                        12.7   9.78  )-----------( 236      ( 28 Aug 2020 07:47 )             39.4             08-28    142  |  109<H>  |  10  ----------------------------<  93  4.2   |  29  |  1.21    Ca    9.0      28 Aug 2020 07:47

## 2020-08-28 NOTE — DISCHARGE NOTE PROVIDER - NSDCCPCAREPLAN_GEN_ALL_CORE_FT
PRINCIPAL DISCHARGE DIAGNOSIS  Diagnosis: Renal colic, bilateral  Assessment and Plan of Treatment:       SECONDARY DISCHARGE DIAGNOSES  Diagnosis: Acute pyelonephritis  Assessment and Plan of Treatment:     Diagnosis: Acute renal failure superimposed on chronic kidney disease, unspecified CKD stage, unspecified acute renal failure type  Assessment and Plan of Treatment:

## 2020-08-28 NOTE — DISCHARGE NOTE NURSING/CASE MANAGEMENT/SOCIAL WORK - PATIENT PORTAL LINK FT
You can access the FollowMyHealth Patient Portal offered by [facility] by registering at the following website: [email or web address]. By joining [employer]’s FollowMyHealth portal, you will also be able to view your health information using other applications (apps) compatible with our system.

## 2020-08-28 NOTE — DISCHARGE NOTE PROVIDER - CARE PROVIDER_API CALL
Graeme Batista  UROLOGY  [address] [place] [address]  Phone: [phone]  Fax: [phone]  Follow Up Time: 1 week

## 2020-08-30 LAB
CULTURE RESULTS: SIGNIFICANT CHANGE UP
CULTURE RESULTS: SIGNIFICANT CHANGE UP
SPECIMEN SOURCE: SIGNIFICANT CHANGE UP
SPECIMEN SOURCE: SIGNIFICANT CHANGE UP

## 2020-09-28 PROBLEM — E78.5 HYPERLIPIDEMIA, UNSPECIFIED: Chronic | Status: ACTIVE | Noted: 2020-08-25

## 2020-09-28 PROBLEM — N20.0 CALCULUS OF KIDNEY: Chronic | Status: ACTIVE | Noted: 2020-08-25

## 2020-09-28 PROBLEM — I10 ESSENTIAL (PRIMARY) HYPERTENSION: Chronic | Status: ACTIVE | Noted: 2020-08-25

## 2020-09-29 ENCOUNTER — OUTPATIENT (OUTPATIENT)
Dept: OUTPATIENT SERVICES | Facility: HOSPITAL | Age: 65
LOS: 1 days | End: 2020-09-29
Payer: COMMERCIAL

## 2020-09-29 ENCOUNTER — APPOINTMENT (OUTPATIENT)
Dept: DISASTER EMERGENCY | Facility: CLINIC | Age: 65
End: 2020-09-29

## 2020-09-29 VITALS
DIASTOLIC BLOOD PRESSURE: 62 MMHG | SYSTOLIC BLOOD PRESSURE: 110 MMHG | RESPIRATION RATE: 18 BRPM | HEART RATE: 88 BPM | HEIGHT: 65 IN | TEMPERATURE: 99 F | WEIGHT: 169.98 LBS | OXYGEN SATURATION: 100 %

## 2020-09-29 DIAGNOSIS — E78.5 HYPERLIPIDEMIA, UNSPECIFIED: ICD-10-CM

## 2020-09-29 DIAGNOSIS — Z01.818 ENCOUNTER FOR OTHER PREPROCEDURAL EXAMINATION: ICD-10-CM

## 2020-09-29 DIAGNOSIS — N20.2 CALCULUS OF KIDNEY WITH CALCULUS OF URETER: ICD-10-CM

## 2020-09-29 DIAGNOSIS — Z98.891 HISTORY OF UTERINE SCAR FROM PREVIOUS SURGERY: Chronic | ICD-10-CM

## 2020-09-29 DIAGNOSIS — I10 ESSENTIAL (PRIMARY) HYPERTENSION: ICD-10-CM

## 2020-09-29 DIAGNOSIS — Z98.890 OTHER SPECIFIED POSTPROCEDURAL STATES: Chronic | ICD-10-CM

## 2020-09-29 DIAGNOSIS — Z90.721 ACQUIRED ABSENCE OF OVARIES, UNILATERAL: Chronic | ICD-10-CM

## 2020-09-29 LAB
ANION GAP SERPL CALC-SCNC: 6 MMOL/L — SIGNIFICANT CHANGE UP (ref 5–17)
APPEARANCE UR: CLEAR — SIGNIFICANT CHANGE UP
BACTERIA # UR AUTO: ABNORMAL /HPF
BILIRUB UR-MCNC: NEGATIVE — SIGNIFICANT CHANGE UP
BUN SERPL-MCNC: 13 MG/DL — SIGNIFICANT CHANGE UP (ref 7–18)
CALCIUM SERPL-MCNC: 9.2 MG/DL — SIGNIFICANT CHANGE UP (ref 8.4–10.5)
CHLORIDE SERPL-SCNC: 107 MMOL/L — SIGNIFICANT CHANGE UP (ref 96–108)
CO2 SERPL-SCNC: 27 MMOL/L — SIGNIFICANT CHANGE UP (ref 22–31)
COLOR SPEC: YELLOW — SIGNIFICANT CHANGE UP
CREAT SERPL-MCNC: 1.23 MG/DL — SIGNIFICANT CHANGE UP (ref 0.5–1.3)
DIFF PNL FLD: ABNORMAL
EPI CELLS # UR: SIGNIFICANT CHANGE UP /HPF
GLUCOSE SERPL-MCNC: 94 MG/DL — SIGNIFICANT CHANGE UP (ref 70–99)
GLUCOSE UR QL: NEGATIVE — SIGNIFICANT CHANGE UP
HCT VFR BLD CALC: 33.4 % — LOW (ref 34.5–45)
HGB BLD-MCNC: 11.5 G/DL — SIGNIFICANT CHANGE UP (ref 11.5–15.5)
KETONES UR-MCNC: NEGATIVE — SIGNIFICANT CHANGE UP
LEUKOCYTE ESTERASE UR-ACNC: ABNORMAL
MCHC RBC-ENTMCNC: 33 PG — SIGNIFICANT CHANGE UP (ref 27–34)
MCHC RBC-ENTMCNC: 34.4 GM/DL — SIGNIFICANT CHANGE UP (ref 32–36)
MCV RBC AUTO: 95.7 FL — SIGNIFICANT CHANGE UP (ref 80–100)
NITRITE UR-MCNC: NEGATIVE — SIGNIFICANT CHANGE UP
NRBC # BLD: 0 /100 WBCS — SIGNIFICANT CHANGE UP (ref 0–0)
PH UR: 6 — SIGNIFICANT CHANGE UP (ref 5–8)
PLATELET # BLD AUTO: 326 K/UL — SIGNIFICANT CHANGE UP (ref 150–400)
POTASSIUM SERPL-MCNC: 3.3 MMOL/L — LOW (ref 3.5–5.3)
POTASSIUM SERPL-SCNC: 3.3 MMOL/L — LOW (ref 3.5–5.3)
PROT UR-MCNC: 100
RBC # BLD: 3.49 M/UL — LOW (ref 3.8–5.2)
RBC # FLD: 12.1 % — SIGNIFICANT CHANGE UP (ref 10.3–14.5)
RBC CASTS # UR COMP ASSIST: >50 /HPF (ref 0–2)
SODIUM SERPL-SCNC: 140 MMOL/L — SIGNIFICANT CHANGE UP (ref 135–145)
SP GR SPEC: 1.01 — SIGNIFICANT CHANGE UP (ref 1.01–1.02)
UROBILINOGEN FLD QL: NEGATIVE — SIGNIFICANT CHANGE UP
WBC # BLD: 14.93 K/UL — HIGH (ref 3.8–10.5)
WBC # FLD AUTO: 14.93 K/UL — HIGH (ref 3.8–10.5)
WBC UR QL: ABNORMAL /HPF (ref 0–5)

## 2020-09-29 PROCEDURE — 80048 BASIC METABOLIC PNL TOTAL CA: CPT

## 2020-09-29 PROCEDURE — G0463: CPT

## 2020-09-29 PROCEDURE — 36415 COLL VENOUS BLD VENIPUNCTURE: CPT

## 2020-09-29 PROCEDURE — 85027 COMPLETE CBC AUTOMATED: CPT

## 2020-09-29 PROCEDURE — 81001 URINALYSIS AUTO W/SCOPE: CPT

## 2020-09-29 PROCEDURE — 87086 URINE CULTURE/COLONY COUNT: CPT

## 2020-09-29 NOTE — H&P PST ADULT - NEGATIVE GASTROINTESTINAL SYMPTOMS
no flatulence/no vomiting/no nausea/no melena/no constipation/no abdominal pain/no diarrhea/no change in bowel habits

## 2020-09-29 NOTE — H&P PST ADULT - HISTORY OF PRESENT ILLNESS
65 yr old female with PMH of HTN, Hyperlipidemia, PSH of cystoscopy, bilateral renal lithotripsy presents with for removal of stents. Pt reports intermittent hematuria, denies dysuria. Pt for cystoscopy, bilateral ureteroscopy, insertion of indwelling ureteral stent on 10/01/2020.

## 2020-09-29 NOTE — H&P PST ADULT - NEGATIVE CARDIOVASCULAR SYMPTOMS
no dyspnea on exertion/no peripheral edema/no claudication/no chest pain/no palpitations/no orthopnea/no paroxysmal nocturnal dyspnea

## 2020-09-29 NOTE — H&P PST ADULT - NSANTHOSAYNRD_GEN_A_CORE
No. JUSTINA screening performed.  STOP BANG Legend: 0-2 = LOW Risk; 3-4 = INTERMEDIATE Risk; 5-8 = HIGH Risk

## 2020-09-29 NOTE — H&P PST ADULT - RS GEN PE MLT RESP DETAILS PC
clear to auscultation bilaterally/no rales/normal/no rhonchi/no chest wall tenderness/no subcutaneous emphysema/breath sounds equal/good air movement/no intercostal retractions/respirations non-labored/no wheezes/airway patent

## 2020-09-29 NOTE — H&P PST ADULT - NEGATIVE MUSCULOSKELETAL SYMPTOMS
no arthritis/no muscle cramps/no muscle weakness/no stiffness/no neck pain/no myalgia/no joint swelling

## 2020-09-29 NOTE — H&P PST ADULT - ASSESSMENT
65 yr old female with PMH of HTN, Hyperlipidemia, PSH of cystoscopy, bilateral renal lithotripsy presents with calculus of kidney with calculus of ureter. Pt for cystoscopy, bilateral ureteroscopy, insertion of indwelling ureteral stent on 10/01/2020.

## 2020-09-29 NOTE — H&P PST ADULT - NSICDXPASTSURGICALHX_GEN_ALL_CORE_FT
PAST SURGICAL HISTORY:  H/O unilateral oophorectomy     History of  section     History of cystoscopy insertion of bilateral double J stent on 2020     PAST SURGICAL HISTORY:  H/O unilateral oophorectomy     History of  section     History of cystoscopy insertion of bilateral double J stent on 2020    History of cystoscopy bilateral ureteral stent placement, lithotripsy

## 2020-09-29 NOTE — H&P PST ADULT - NSICDXPASTMEDICALHX_GEN_ALL_CORE_FT
PAST MEDICAL HISTORY:  HLD (hyperlipidemia)     HTN (hypertension)     Renal stone      PAST MEDICAL HISTORY:  Calculus of kidney with calculus of ureter     HLD (hyperlipidemia)     HTN (hypertension)     Renal stone

## 2020-09-29 NOTE — H&P PST ADULT - NEGATIVE NEUROLOGICAL SYMPTOMS
no weakness/no generalized seizures/no syncope/no tremors/no focal seizures/no vertigo/no paresthesias

## 2020-09-29 NOTE — H&P PST ADULT - GASTROINTESTINAL DETAILS
no distention/soft/no rebound tenderness/no rigidity/normal/nontender/no organomegaly/no guarding/no masses palpable/bowel sounds normal/no bruit

## 2020-09-29 NOTE — H&P PST ADULT - NSICDXPROBLEM_GEN_ALL_CORE_FT
PROBLEM DIAGNOSES  Problem: Calculus of kidney with calculus of ureter  Assessment and Plan: Cystoscopy with bilateral ureteroscopy with insertion of indwelling ureteral stent on 10/01/2020. Preoperative instructions discussed with pt via [place] speaking pacific  ID#[id number]. Syriac version given to pt. Instructed pt that she will need someone to escort her home after surgery, that no one will be allowed to come to hospital with her, to notify security when she arrives in the arrives in the lobby of the hospital that she is here for surgery, not to eat or drink anything after midnight the night before the surgery, to avoid NSAIDS such as Ibuprofen, motrin, aleve, advil, naproxen before surgery, to take Tylenol if needed for pain, to report if she has been exposed to any one with any contagious diseases including Covid-19 or if she is exhibiting any symptoms of COVID-19 and to keep appointment for COVID-19 test. Instructed about use of Chlorhexidine 4% soap. Verbalized understanding of instructions given.     Problem: HTN (hypertension)  Assessment and Plan: Instructed to continue amlodipine and take with sips of water on day of surgery.  Follow-up with PCP for management.     Problem: HLD (hyperlipidemia)  Assessment and Plan: Instructed pt to continue Simvastatin and to follow-up with PCP for lipid management

## 2020-09-29 NOTE — H&P PST ADULT - MEDICATION HERBAL REMEDIES, PROFILE
GROUP THERAPY PROGRESS NOTE    Hezekiah Fothergill is participating in [employer] [employer] [facility] [id number] Team Meetings    Group time: 20 minutes    Personal goal for participation: Meet with my Tx team    Goal orientation: personal    Group therapy participation: active    Therapeutic interventions reviewed and discussed:     Impression of participation: Pt.'s behavior was appropriate, less intrusive with peers and offered insight into her needs. no

## 2020-09-29 NOTE — H&P PST ADULT - NEGATIVE FEMALE-SPECIFIC SYMPTOMS
no amenorrhea/no spotting/no abnormal vaginal bleeding/no pelvic pain/no irregular menses/no menorrhagia

## 2020-09-30 ENCOUNTER — TRANSCRIPTION ENCOUNTER (OUTPATIENT)
Age: 65
End: 2020-09-30

## 2020-09-30 LAB
CULTURE RESULTS: SIGNIFICANT CHANGE UP
SARS-COV-2 N GENE NPH QL NAA+PROBE: NOT DETECTED
SPECIMEN SOURCE: SIGNIFICANT CHANGE UP

## 2020-09-30 PROCEDURE — 86803 HEPATITIS C AB TEST: CPT

## 2020-09-30 PROCEDURE — 93005 ELECTROCARDIOGRAM TRACING: CPT

## 2020-09-30 PROCEDURE — 86850 RBC ANTIBODY SCREEN: CPT

## 2020-09-30 PROCEDURE — 87086 URINE CULTURE/COLONY COUNT: CPT

## 2020-09-30 PROCEDURE — 99285 EMERGENCY DEPT VISIT HI MDM: CPT | Mod: 25

## 2020-09-30 PROCEDURE — C2617: CPT

## 2020-09-30 PROCEDURE — 86870 RBC ANTIBODY IDENTIFICATION: CPT

## 2020-09-30 PROCEDURE — 86900 BLOOD TYPING SEROLOGIC ABO: CPT

## 2020-09-30 PROCEDURE — 85027 COMPLETE CBC AUTOMATED: CPT

## 2020-09-30 PROCEDURE — 83735 ASSAY OF MAGNESIUM: CPT

## 2020-09-30 PROCEDURE — 80053 COMPREHEN METABOLIC PANEL: CPT

## 2020-09-30 PROCEDURE — 74176 CT ABD & PELVIS W/O CONTRAST: CPT

## 2020-09-30 PROCEDURE — 74420 UROGRAPHY RTRGR +-KUB: CPT

## 2020-09-30 PROCEDURE — C1769: CPT

## 2020-09-30 PROCEDURE — 71046 X-RAY EXAM CHEST 2 VIEWS: CPT

## 2020-09-30 PROCEDURE — 83690 ASSAY OF LIPASE: CPT

## 2020-09-30 PROCEDURE — 86902 BLOOD TYPE ANTIGEN DONOR EA: CPT

## 2020-09-30 PROCEDURE — 80048 BASIC METABOLIC PNL TOTAL CA: CPT

## 2020-09-30 PROCEDURE — 87635 SARS-COV-2 COVID-19 AMP PRB: CPT

## 2020-09-30 PROCEDURE — 87040 BLOOD CULTURE FOR BACTERIA: CPT

## 2020-09-30 PROCEDURE — C1758: CPT

## 2020-09-30 PROCEDURE — 86901 BLOOD TYPING SEROLOGIC RH(D): CPT

## 2020-09-30 PROCEDURE — 85730 THROMBOPLASTIN TIME PARTIAL: CPT

## 2020-09-30 PROCEDURE — 81001 URINALYSIS AUTO W/SCOPE: CPT

## 2020-09-30 PROCEDURE — 86905 BLOOD TYPING RBC ANTIGENS: CPT

## 2020-09-30 PROCEDURE — 86769 SARS-COV-2 COVID-19 ANTIBODY: CPT

## 2020-09-30 PROCEDURE — 36415 COLL VENOUS BLD VENIPUNCTURE: CPT

## 2020-09-30 PROCEDURE — 85610 PROTHROMBIN TIME: CPT

## 2020-09-30 PROCEDURE — 84100 ASSAY OF PHOSPHORUS: CPT

## 2020-10-01 ENCOUNTER — RESULT REVIEW (OUTPATIENT)
Age: 65
End: 2020-10-01

## 2020-10-01 ENCOUNTER — OUTPATIENT (OUTPATIENT)
Dept: OUTPATIENT SERVICES | Facility: HOSPITAL | Age: 65
LOS: 1 days | End: 2020-10-01
Payer: COMMERCIAL

## 2020-10-01 VITALS
TEMPERATURE: 98 F | RESPIRATION RATE: 16 BRPM | DIASTOLIC BLOOD PRESSURE: 72 MMHG | SYSTOLIC BLOOD PRESSURE: 116 MMHG | OXYGEN SATURATION: 97 % | HEART RATE: 89 BPM

## 2020-10-01 VITALS
TEMPERATURE: 98 F | RESPIRATION RATE: 18 BRPM | WEIGHT: 169.98 LBS | DIASTOLIC BLOOD PRESSURE: 78 MMHG | OXYGEN SATURATION: 99 % | HEART RATE: 113 BPM | SYSTOLIC BLOOD PRESSURE: 129 MMHG | HEIGHT: 65 IN

## 2020-10-01 DIAGNOSIS — Z98.890 OTHER SPECIFIED POSTPROCEDURAL STATES: Chronic | ICD-10-CM

## 2020-10-01 DIAGNOSIS — Z90.721 ACQUIRED ABSENCE OF OVARIES, UNILATERAL: Chronic | ICD-10-CM

## 2020-10-01 DIAGNOSIS — N20.2 CALCULUS OF KIDNEY WITH CALCULUS OF URETER: ICD-10-CM

## 2020-10-01 DIAGNOSIS — Z98.891 HISTORY OF UTERINE SCAR FROM PREVIOUS SURGERY: Chronic | ICD-10-CM

## 2020-10-01 PROCEDURE — 52356 CYSTO/URETERO W/LITHOTRIPSY: CPT | Mod: 50

## 2020-10-01 PROCEDURE — 76000 FLUOROSCOPY <1 HR PHYS/QHP: CPT

## 2020-10-01 PROCEDURE — ZZZZZ: CPT

## 2020-10-01 PROCEDURE — 88300 SURGICAL PATH GROSS: CPT

## 2020-10-01 PROCEDURE — C1769: CPT

## 2020-10-01 PROCEDURE — 88300 SURGICAL PATH GROSS: CPT | Mod: 26

## 2020-10-01 PROCEDURE — C1889: CPT

## 2020-10-01 RX ORDER — HYDROMORPHONE HYDROCHLORIDE 2 MG/ML
1 INJECTION INTRAMUSCULAR; INTRAVENOUS; SUBCUTANEOUS
Refills: 0 | Status: DISCONTINUED | OUTPATIENT
Start: 2020-10-01 | End: 2020-10-01

## 2020-10-01 RX ORDER — ACETAMINOPHEN 500 MG
1000 TABLET ORAL ONCE
Refills: 0 | Status: DISCONTINUED | OUTPATIENT
Start: 2020-10-01 | End: 2020-10-01

## 2020-10-01 RX ORDER — SODIUM CHLORIDE 9 MG/ML
500 INJECTION, SOLUTION INTRAVENOUS
Refills: 0 | Status: DISCONTINUED | OUTPATIENT
Start: 2020-10-01 | End: 2020-10-08

## 2020-10-01 RX ORDER — ONDANSETRON 8 MG/1
4 TABLET, FILM COATED ORAL ONCE
Refills: 0 | Status: DISCONTINUED | OUTPATIENT
Start: 2020-10-01 | End: 2020-10-01

## 2020-10-01 RX ORDER — MOXIFLOXACIN HYDROCHLORIDE TABLETS, 400 MG 400 MG/1
1 TABLET, FILM COATED ORAL
Qty: 10 | Refills: 0
Start: 2020-10-01 | End: 2020-10-05

## 2020-10-01 RX ORDER — SODIUM CHLORIDE 9 MG/ML
3 INJECTION INTRAMUSCULAR; INTRAVENOUS; SUBCUTANEOUS EVERY 8 HOURS
Refills: 0 | Status: DISCONTINUED | OUTPATIENT
Start: 2020-10-01 | End: 2020-10-01

## 2020-10-01 RX ORDER — HYDROMORPHONE HYDROCHLORIDE 2 MG/ML
0.5 INJECTION INTRAMUSCULAR; INTRAVENOUS; SUBCUTANEOUS
Refills: 0 | Status: DISCONTINUED | OUTPATIENT
Start: 2020-10-01 | End: 2020-10-01

## 2020-10-01 RX ADMIN — SODIUM CHLORIDE 3 MILLILITER(S): 9 INJECTION INTRAMUSCULAR; INTRAVENOUS; SUBCUTANEOUS at 08:14

## 2020-10-01 NOTE — ASU PATIENT PROFILE, ADULT - HEALTHCARE INFORMATION NEEDED, PROFILE
patient encouraged to ask questions and verbalize fears/concerns as needed.  taught verbal pain scale.  use  phone

## 2020-10-01 NOTE — ASU DISCHARGE PLAN (ADULT/PEDIATRIC) - CARE PROVIDER_API CALL
Graeme Batista  UROLOGY  [address] [place] [address]  Phone: [phone]  Fax: [phone]  Follow Up Time: 1 week

## 2020-10-06 LAB — SURGICAL PATHOLOGY STUDY: SIGNIFICANT CHANGE UP

## 2020-10-17 ENCOUNTER — INPATIENT (INPATIENT)
Facility: HOSPITAL | Age: 65
LOS: 2 days | Discharge: HOME | DRG: 872 | End: 2020-10-20
Attending: INTERNAL MEDICINE | Admitting: INTERNAL MEDICINE
Payer: COMMERCIAL

## 2020-10-17 VITALS
HEART RATE: 125 BPM | RESPIRATION RATE: 20 BRPM | SYSTOLIC BLOOD PRESSURE: 102 MMHG | WEIGHT: 164.91 LBS | DIASTOLIC BLOOD PRESSURE: 69 MMHG | HEIGHT: 65 IN | OXYGEN SATURATION: 96 % | TEMPERATURE: 98 F

## 2020-10-17 DIAGNOSIS — Z98.890 OTHER SPECIFIED POSTPROCEDURAL STATES: Chronic | ICD-10-CM

## 2020-10-17 DIAGNOSIS — Z29.9 ENCOUNTER FOR PROPHYLACTIC MEASURES, UNSPECIFIED: ICD-10-CM

## 2020-10-17 DIAGNOSIS — N17.9 ACUTE KIDNEY FAILURE, UNSPECIFIED: ICD-10-CM

## 2020-10-17 DIAGNOSIS — Z90.721 ACQUIRED ABSENCE OF OVARIES, UNILATERAL: Chronic | ICD-10-CM

## 2020-10-17 DIAGNOSIS — E78.5 HYPERLIPIDEMIA, UNSPECIFIED: ICD-10-CM

## 2020-10-17 DIAGNOSIS — K52.9 NONINFECTIVE GASTROENTERITIS AND COLITIS, UNSPECIFIED: ICD-10-CM

## 2020-10-17 DIAGNOSIS — Z98.891 HISTORY OF UTERINE SCAR FROM PREVIOUS SURGERY: Chronic | ICD-10-CM

## 2020-10-17 DIAGNOSIS — N88.9 NONINFLAMMATORY DISORDER OF CERVIX UTERI, UNSPECIFIED: ICD-10-CM

## 2020-10-17 DIAGNOSIS — I10 ESSENTIAL (PRIMARY) HYPERTENSION: ICD-10-CM

## 2020-10-17 DIAGNOSIS — A41.9 SEPSIS, UNSPECIFIED ORGANISM: ICD-10-CM

## 2020-10-17 PROBLEM — N20.2 CALCULUS OF KIDNEY WITH CALCULUS OF URETER: Chronic | Status: ACTIVE | Noted: 2020-09-29

## 2020-10-17 LAB
ALBUMIN SERPL ELPH-MCNC: 2.5 G/DL — LOW (ref 3.5–5)
ALP SERPL-CCNC: 119 U/L — SIGNIFICANT CHANGE UP (ref 40–120)
ALT FLD-CCNC: 43 U/L DA — SIGNIFICANT CHANGE UP (ref 10–60)
ANION GAP SERPL CALC-SCNC: 4 MMOL/L — LOW (ref 5–17)
APPEARANCE UR: ABNORMAL
AST SERPL-CCNC: 21 U/L — SIGNIFICANT CHANGE UP (ref 10–40)
BACTERIA # UR AUTO: ABNORMAL /HPF
BASOPHILS # BLD AUTO: 0.36 K/UL — HIGH (ref 0–0.2)
BASOPHILS NFR BLD AUTO: 2 % — SIGNIFICANT CHANGE UP (ref 0–2)
BILIRUB SERPL-MCNC: 0.6 MG/DL — SIGNIFICANT CHANGE UP (ref 0.2–1.2)
BILIRUB UR-MCNC: ABNORMAL
BUN SERPL-MCNC: 15 MG/DL — SIGNIFICANT CHANGE UP (ref 7–18)
CALCIUM SERPL-MCNC: 8.7 MG/DL — SIGNIFICANT CHANGE UP (ref 8.4–10.5)
CHLORIDE SERPL-SCNC: 102 MMOL/L — SIGNIFICANT CHANGE UP (ref 96–108)
CO2 SERPL-SCNC: 28 MMOL/L — SIGNIFICANT CHANGE UP (ref 22–31)
COLOR SPEC: YELLOW — SIGNIFICANT CHANGE UP
CREAT SERPL-MCNC: 1.37 MG/DL — HIGH (ref 0.5–1.3)
DIFF PNL FLD: ABNORMAL
EOSINOPHIL # BLD AUTO: 0 K/UL — SIGNIFICANT CHANGE UP (ref 0–0.5)
EOSINOPHIL NFR BLD AUTO: 0 % — SIGNIFICANT CHANGE UP (ref 0–6)
EPI CELLS # UR: ABNORMAL /HPF
GLUCOSE SERPL-MCNC: 90 MG/DL — SIGNIFICANT CHANGE UP (ref 70–99)
GLUCOSE UR QL: NEGATIVE — SIGNIFICANT CHANGE UP
HCT VFR BLD CALC: 34.8 % — SIGNIFICANT CHANGE UP (ref 34.5–45)
HGB BLD-MCNC: 11.7 G/DL — SIGNIFICANT CHANGE UP (ref 11.5–15.5)
KETONES UR-MCNC: ABNORMAL
LACTATE SERPL-SCNC: 1.2 MMOL/L — SIGNIFICANT CHANGE UP (ref 0.7–2)
LEUKOCYTE ESTERASE UR-ACNC: ABNORMAL
LIDOCAIN IGE QN: 53 U/L — LOW (ref 73–393)
LYMPHOCYTES # BLD AUTO: 1.43 K/UL — SIGNIFICANT CHANGE UP (ref 1–3.3)
LYMPHOCYTES # BLD AUTO: 8 % — LOW (ref 13–44)
MANUAL SMEAR VERIFICATION: SIGNIFICANT CHANGE UP
MCHC RBC-ENTMCNC: 32.1 PG — SIGNIFICANT CHANGE UP (ref 27–34)
MCHC RBC-ENTMCNC: 33.6 GM/DL — SIGNIFICANT CHANGE UP (ref 32–36)
MCV RBC AUTO: 95.6 FL — SIGNIFICANT CHANGE UP (ref 80–100)
MONOCYTES # BLD AUTO: 1.78 K/UL — HIGH (ref 0–0.9)
MONOCYTES NFR BLD AUTO: 10 % — SIGNIFICANT CHANGE UP (ref 2–14)
NEUTROPHILS # BLD AUTO: 14.27 K/UL — HIGH (ref 1.8–7.4)
NEUTROPHILS NFR BLD AUTO: 69 % — SIGNIFICANT CHANGE UP (ref 43–77)
NEUTS BAND # BLD: 11 % — HIGH (ref 0–8)
NITRITE UR-MCNC: NEGATIVE — SIGNIFICANT CHANGE UP
NRBC # BLD: 0 /100 — SIGNIFICANT CHANGE UP (ref 0–0)
PH UR: 6.5 — SIGNIFICANT CHANGE UP (ref 5–8)
PLAT MORPH BLD: NORMAL — SIGNIFICANT CHANGE UP
PLATELET # BLD AUTO: 287 K/UL — SIGNIFICANT CHANGE UP (ref 150–400)
POTASSIUM SERPL-MCNC: 4.2 MMOL/L — SIGNIFICANT CHANGE UP (ref 3.5–5.3)
POTASSIUM SERPL-SCNC: 4.2 MMOL/L — SIGNIFICANT CHANGE UP (ref 3.5–5.3)
PROT SERPL-MCNC: 6.9 G/DL — SIGNIFICANT CHANGE UP (ref 6–8.3)
PROT UR-MCNC: 30 MG/DL
RAPID RVP RESULT: SIGNIFICANT CHANGE UP
RBC # BLD: 3.64 M/UL — LOW (ref 3.8–5.2)
RBC # FLD: 12.5 % — SIGNIFICANT CHANGE UP (ref 10.3–14.5)
RBC BLD AUTO: NORMAL — SIGNIFICANT CHANGE UP
RBC CASTS # UR COMP ASSIST: ABNORMAL /HPF (ref 0–2)
SARS-COV-2 RNA SPEC QL NAA+PROBE: SIGNIFICANT CHANGE UP
SODIUM SERPL-SCNC: 134 MMOL/L — LOW (ref 135–145)
SP GR SPEC: 1.01 — SIGNIFICANT CHANGE UP (ref 1.01–1.02)
UROBILINOGEN FLD QL: 1
WBC # BLD: 17.84 K/UL — HIGH (ref 3.8–10.5)
WBC # FLD AUTO: 17.84 K/UL — HIGH (ref 3.8–10.5)
WBC UR QL: SIGNIFICANT CHANGE UP /HPF (ref 0–5)

## 2020-10-17 PROCEDURE — 99285 EMERGENCY DEPT VISIT HI MDM: CPT

## 2020-10-17 PROCEDURE — 76856 US EXAM PELVIC COMPLETE: CPT | Mod: 26

## 2020-10-17 PROCEDURE — 93010 ELECTROCARDIOGRAM REPORT: CPT

## 2020-10-17 PROCEDURE — 76700 US EXAM ABDOM COMPLETE: CPT | Mod: 26

## 2020-10-17 PROCEDURE — 71045 X-RAY EXAM CHEST 1 VIEW: CPT | Mod: 26

## 2020-10-17 PROCEDURE — 74177 CT ABD & PELVIS W/CONTRAST: CPT | Mod: 26

## 2020-10-17 RX ORDER — ACETAMINOPHEN 500 MG
650 TABLET ORAL EVERY 6 HOURS
Refills: 0 | Status: DISCONTINUED | OUTPATIENT
Start: 2020-10-17 | End: 2020-10-20

## 2020-10-17 RX ORDER — PIPERACILLIN AND TAZOBACTAM 4; .5 G/20ML; G/20ML
3.38 INJECTION, POWDER, LYOPHILIZED, FOR SOLUTION INTRAVENOUS ONCE
Refills: 0 | Status: COMPLETED | OUTPATIENT
Start: 2020-10-17 | End: 2020-10-17

## 2020-10-17 RX ORDER — METRONIDAZOLE 500 MG
500 TABLET ORAL EVERY 8 HOURS
Refills: 0 | Status: DISCONTINUED | OUTPATIENT
Start: 2020-10-17 | End: 2020-10-20

## 2020-10-17 RX ORDER — ONDANSETRON 8 MG/1
4 TABLET, FILM COATED ORAL ONCE
Refills: 0 | Status: COMPLETED | OUTPATIENT
Start: 2020-10-17 | End: 2020-10-17

## 2020-10-17 RX ORDER — HEPARIN SODIUM 5000 [USP'U]/ML
5000 INJECTION INTRAVENOUS; SUBCUTANEOUS EVERY 8 HOURS
Refills: 0 | Status: DISCONTINUED | OUTPATIENT
Start: 2020-10-17 | End: 2020-10-20

## 2020-10-17 RX ORDER — CIPROFLOXACIN LACTATE 400MG/40ML
400 VIAL (ML) INTRAVENOUS EVERY 12 HOURS
Refills: 0 | Status: DISCONTINUED | OUTPATIENT
Start: 2020-10-18 | End: 2020-10-20

## 2020-10-17 RX ORDER — SODIUM CHLORIDE 9 MG/ML
1000 INJECTION INTRAMUSCULAR; INTRAVENOUS; SUBCUTANEOUS
Refills: 0 | Status: DISCONTINUED | OUTPATIENT
Start: 2020-10-17 | End: 2020-10-20

## 2020-10-17 RX ORDER — CIPROFLOXACIN LACTATE 400MG/40ML
400 VIAL (ML) INTRAVENOUS ONCE
Refills: 0 | Status: COMPLETED | OUTPATIENT
Start: 2020-10-17 | End: 2020-10-17

## 2020-10-17 RX ORDER — SODIUM CHLORIDE 9 MG/ML
500 INJECTION INTRAMUSCULAR; INTRAVENOUS; SUBCUTANEOUS ONCE
Refills: 0 | Status: COMPLETED | OUTPATIENT
Start: 2020-10-17 | End: 2020-10-17

## 2020-10-17 RX ORDER — SIMVASTATIN 20 MG/1
40 TABLET, FILM COATED ORAL AT BEDTIME
Refills: 0 | Status: DISCONTINUED | OUTPATIENT
Start: 2020-10-17 | End: 2020-10-20

## 2020-10-17 RX ORDER — SODIUM CHLORIDE 9 MG/ML
1000 INJECTION INTRAMUSCULAR; INTRAVENOUS; SUBCUTANEOUS ONCE
Refills: 0 | Status: COMPLETED | OUTPATIENT
Start: 2020-10-17 | End: 2020-10-17

## 2020-10-17 RX ORDER — METRONIDAZOLE 500 MG
500 TABLET ORAL ONCE
Refills: 0 | Status: COMPLETED | OUTPATIENT
Start: 2020-10-17 | End: 2020-10-17

## 2020-10-17 RX ORDER — CIPROFLOXACIN LACTATE 400MG/40ML
400 VIAL (ML) INTRAVENOUS EVERY 12 HOURS
Refills: 0 | Status: DISCONTINUED | OUTPATIENT
Start: 2020-10-17 | End: 2020-10-17

## 2020-10-17 RX ADMIN — SIMVASTATIN 40 MILLIGRAM(S): 20 TABLET, FILM COATED ORAL at 21:16

## 2020-10-17 RX ADMIN — SODIUM CHLORIDE 1000 MILLILITER(S): 9 INJECTION INTRAMUSCULAR; INTRAVENOUS; SUBCUTANEOUS at 13:01

## 2020-10-17 RX ADMIN — Medication 100 MILLIGRAM(S): at 21:16

## 2020-10-17 RX ADMIN — SODIUM CHLORIDE 500 MILLILITER(S): 9 INJECTION INTRAMUSCULAR; INTRAVENOUS; SUBCUTANEOUS at 15:44

## 2020-10-17 RX ADMIN — ONDANSETRON 4 MILLIGRAM(S): 8 TABLET, FILM COATED ORAL at 15:57

## 2020-10-17 RX ADMIN — Medication 200 MILLIGRAM(S): at 15:43

## 2020-10-17 RX ADMIN — SODIUM CHLORIDE 1000 MILLILITER(S): 9 INJECTION INTRAMUSCULAR; INTRAVENOUS; SUBCUTANEOUS at 11:51

## 2020-10-17 RX ADMIN — SODIUM CHLORIDE 1000 MILLILITER(S): 9 INJECTION INTRAMUSCULAR; INTRAVENOUS; SUBCUTANEOUS at 15:19

## 2020-10-17 RX ADMIN — HEPARIN SODIUM 5000 UNIT(S): 5000 INJECTION INTRAVENOUS; SUBCUTANEOUS at 21:16

## 2020-10-17 RX ADMIN — PIPERACILLIN AND TAZOBACTAM 200 GRAM(S): 4; .5 INJECTION, POWDER, LYOPHILIZED, FOR SOLUTION INTRAVENOUS at 13:00

## 2020-10-17 RX ADMIN — PIPERACILLIN AND TAZOBACTAM 3.38 GRAM(S): 4; .5 INJECTION, POWDER, LYOPHILIZED, FOR SOLUTION INTRAVENOUS at 13:45

## 2020-10-17 RX ADMIN — Medication 100 MILLIGRAM(S): at 15:20

## 2020-10-17 RX ADMIN — SODIUM CHLORIDE 80 MILLILITER(S): 9 INJECTION INTRAMUSCULAR; INTRAVENOUS; SUBCUTANEOUS at 15:43

## 2020-10-17 NOTE — ED ADULT NURSE NOTE - NSIMPLEMENTINTERV_GEN_ALL_ED
Implemented All Universal Safety Interventions:  [place] to call system. Call bell, personal items and telephone within reach. Instruct patient to call for assistance. Room bathroom lighting operational. Non-slip footwear when patient is off stretcher. Physically safe environment: no spills, clutter or unnecessary equipment. Stretcher in lowest position, wheels locked, appropriate side rails in place.

## 2020-10-17 NOTE — ED PROVIDER NOTE - PMH
Calculus of kidney with calculus of ureter    HLD (hyperlipidemia)    HTN (hypertension)    Renal stone

## 2020-10-17 NOTE — H&P ADULT - PROBLEM SELECTOR PLAN 3
On amlodipine at home  Will hold for now due to sepsis  Monitor BMP US pelvis shows 4.5 cm left cervical mass extending into the left parametrium  Gyn and Heme/onc consult in AM

## 2020-10-17 NOTE — H&P ADULT - ATTENDING COMMENTS
PATIENT WAS SEEN AND EXAMINED   - ABDOMINAL COLIC, PANCOLITIS - ON CIPROFLOXACIN AND FLAGYL  - SUSPECT CERVICAL CANCER, LEFT ATROPHIC KIDNEY WITH LEFT HYDRONEPHROSIS AND LEFT HYDROURETER. OBTAIN GYN CONSULT AND TRANS VAGINAL U/S,  MARKER TO F/UP  - UROLOGY CONSULT AND ONCOLOGY CONSULT DR. MATA TO F/UP  - GI AND DVT PROPHYLAXIS  - DR. EDGAR HERNANDEZ

## 2020-10-17 NOTE — H&P ADULT - PROBLEM SELECTOR PLAN 2
p/w BUN/Cr 15/1.37  CT A/P:  Atrophic left kidney with severe hydronephrosis and hydroureter to level of a left adnexal or cervical mass. 5.6 x 3.9 x 7.0 cm left perinephric homogeneous in density with Hounsfield units of 60. This is indeterminant this may represent a perinephric hematoma, mass, or hemorrhagic cyst.   f/u urine lytes  Monitor BMP p/w BUN/Cr 15/1.37  CT A/P:  Atrophic left kidney with severe hydronephrosis and hydroureter to level of a left adnexal or cervical mass. 5.6 x 3.9 x 7.0 cm left perinephric homogeneous in density with Hounsfield units of 60. This is indeterminant this may represent a perinephric hematoma, mass, or hemorrhagic cyst.   f/u urine lytes  Monitor BMP  Urology consulted

## 2020-10-17 NOTE — CONSULT NOTE ADULT - SUBJECTIVE AND OBJECTIVE BOX
HPI:  65 F with PMH of HTN, HLD, renal stones s/p bilateral ureteral stents presents to ED with abdominal pain X 7 days. Pt c/o of abdominal pain for the last 7 days which has got worse over the past three days and is associated with diarrhea and nausea. Pt denies any fever, vomiting, pain, burning with urination, increased urinary frequency, blood in urine, chest pain, shortness of breath, cough or any other acute complaints.     In ED, /69, , Temp 98.1 (17 Oct 2020 15:11)      PAST MEDICAL & SURGICAL HISTORY:  Calculus of kidney with calculus of ureter    Renal stone    HLD (hyperlipidemia)    HTN (hypertension)    History of cystoscopy  bilateral ureteral stent placement, lithotripsy    H/O unilateral oophorectomy    History of  section    History of cystoscopy  insertion of bilateral double J stent on 2020        Vital Signs Last 24 Hrs  T(C): 37.4 (17 Oct 2020 16:08), Max: 37.9 (17 Oct 2020 15:19)  T(F): 99.4 (17 Oct 2020 16:08), Max: 100.3 (17 Oct 2020 15:19)  HR: 111 (17 Oct 2020 15:19) (111 - 125)  BP: 102/71 (17 Oct 2020 15:19) (102/69 - 102/71)  BP(mean): --  RR: 18 (17 Oct 2020 15:19) (18 - 20)  SpO2: 98% (17 Oct 2020 15:19) (96% - 98%)                          11.7   17.84 )-----------( 287      ( 17 Oct 2020 11:54 )             34.8     10-17    134<L>  |  102  |  15  ----------------------------<  90  4.2   |  28  |  1.37<H>    Ca    8.7      17 Oct 2020 11:54    TPro  6.9  /  Alb  2.5<L>  /  TBili  0.6  /  DBili  x   /  AST  21  /  ALT  43  /  AlkPhos  119  10-17        PHYSICAL EXAM  General: WN/WD NAD  Neurology: A&Ox3, nonfocal, FREDERICK x 4  Respiratory: CTA B/L  CV: RRR, S1S2, no murmurs, rubs or gallops  Abdominal: Soft, NT, ND +BS, Last BM  Extremities: No edema, + peripheral pulses        ASSESSMENT/ PLAN:   HPI:  65 F with PMH of HTN, HLD, renal stones s/p bilateral ureteral stents presents to ED with abdominal pain X 7 days. Pt c/o of abdominal pain for the last 7 days which has got worse over the past three days and is associated with diarrhea and nausea. Pt denies any fever, vomiting, pain, burning with urination, increased urinary frequency, blood in urine, chest pain, shortness of breath, cough or any other acute complaints.     In ED, /69, , Temp 98.1 (17 Oct 2020 15:11)      PAST MEDICAL & SURGICAL HISTORY:  Calculus of kidney with calculus of ureter    Renal stone    HLD (hyperlipidemia)    HTN (hypertension)    History of cystoscopy  bilateral ureteral stent placement, lithotripsy    H/O unilateral oophorectomy    History of  section    History of cystoscopy  insertion of bilateral double J stent on 2020    < from: CT Abdomen and Pelvis w/ IV Cont (10.17.20 @ 13:52) >  IMPRESSION:  Pancolitis new since the previous exam.    Atrophic left kidney with severe hydronephrosis and hydroureter to level of a left adnexal or cervical mass. 5.6 x 3.9 x 7.0 cm left perinephric homogeneous in density with Hounsfield units of 60. This is indeterminant this may represent a perinephric hematoma, mass, or hemorrhagic cyst. New since the previous exam.    4.0 x 8.8 cm left adnexal or cervical mass. This is indeterminant. Malignancy is not excluded. Ultrasound evaluation is recommended.      < end of copied text >      Vital Signs Last 24 Hrs  T(C): 37.4 (17 Oct 2020 16:08), Max: 37.9 (17 Oct 2020 15:19)  T(F): 99.4 (17 Oct 2020 16:08), Max: 100.3 (17 Oct 2020 15:19)  HR: 111 (17 Oct 2020 15:19) (111 - 125)  BP: 102/71 (17 Oct 2020 15:19) (102/69 - 102/71)  BP(mean): --  RR: 18 (17 Oct 2020 15:19) (18 - 20)  SpO2: 98% (17 Oct 2020 15:19) (96% - 98%)                          11.7   17.84 )-----------( 287      ( 17 Oct 2020 11:54 )             34.8     10-17    134<L>  |  102  |  15  ----------------------------<  90  4.2   |  28  |  1.37<H>    Ca    8.7      17 Oct 2020 11:54    TPro  6.9  /  Alb  2.5<L>  /  TBili  0.6  /  DBili  x   /  AST  21  /  ALT  43  /  AlkPhos  119  10-17        PHYSICAL EXAM  General: WN/WD NAD  Neurology: A&Ox3, nonfocal, FREDERICK x 4  Respiratory: CTA B/L  CV: RRR, S1S2, no murmurs, rubs or gallops  Abdominal: Soft, NT, ND +BS, Last BM  Extremities: No edema, + peripheral pulses        ASSESSMENT/ PLAN:  case/CT findings discussed with Dr Batista  known h/o hydronephrosis  cont care as per primary team  iv abx as per med for colitis  hydrate, trend labs  Dr Batista to f/u     HPI:  65 F with PMH of HTN, HLD, renal stones s/p bilateral ureteral stents presents to ED with abdominal pain X 7 days. Pt c/o of abdominal pain for the last 7 days which has got worse over the past three days and is associated with diarrhea and nausea. Pt denies any fever, vomiting, pain, burning with urination, increased urinary frequency, blood in urine, chest pain, shortness of breath, cough or any other acute complaints.     In ED, /69, , Temp 98.1 (17 Oct 2020 15:11)      PAST MEDICAL & SURGICAL HISTORY:  Calculus of kidney with calculus of ureter    Renal stone    HLD (hyperlipidemia)    HTN (hypertension)    History of cystoscopy  bilateral ureteral stent placement, lithotripsy    H/O unilateral oophorectomy    History of  section    History of cystoscopy  insertion of bilateral double J stent on 2020    < from: CT Abdomen and Pelvis w/ IV Cont (10.17.20 @ 13:52) >  IMPRESSION:  Pancolitis new since the previous exam.    Atrophic left kidney with severe hydronephrosis and hydroureter to level of a left adnexal or cervical mass. 5.6 x 3.9 x 7.0 cm left perinephric homogeneous in density with Hounsfield units of 60. This is indeterminant this may represent a perinephric hematoma, mass, or hemorrhagic cyst. New since the previous exam.    4.0 x 8.8 cm left adnexal or cervical mass. This is indeterminant. Malignancy is not excluded. Ultrasound evaluation is recommended.      < end of copied text >      Vital Signs Last 24 Hrs  T(C): 37.4 (17 Oct 2020 16:08), Max: 37.9 (17 Oct 2020 15:19)  T(F): 99.4 (17 Oct 2020 16:08), Max: 100.3 (17 Oct 2020 15:19)  HR: 111 (17 Oct 2020 15:19) (111 - 125)  BP: 102/71 (17 Oct 2020 15:19) (102/69 - 102/71)  BP(mean): --  RR: 18 (17 Oct 2020 15:19) (18 - 20)  SpO2: 98% (17 Oct 2020 15:19) (96% - 98%)                          11.7   17.84 )-----------( 287      ( 17 Oct 2020 11:54 )             34.8     10-17    134<L>  |  102  |  15  ----------------------------<  90  4.2   |  28  |  1.37<H>    Ca    8.7      17 Oct 2020 11:54    TPro  6.9  /  Alb  2.5<L>  /  TBili  0.6  /  DBili  x   /  AST  21  /  ALT  43  /  AlkPhos  119  10-17        PHYSICAL EXAM  Pt awake, alert, NAD  Respiratory: CTA B/L  CV: RRR, S1S2  Abdominal: Soft, NT, ND +BS, no CVA tenderness  Extremities: No edema, + peripheral pulses        ASSESSMENT/ PLAN:  case/CT findings discussed with Dr Batista  known h/o hydronephrosis  cont care as per primary team  iv abx as per med for colitis  hydrate, trend labs  Dr Batista to f/u     HPI:  65 F with PMH of HTN, HLD, renal stones s/p bilateral ureteral stents presents to ED with abdominal pain X 7 days. Pt c/o of abdominal pain for the last 7 days which has got worse over the past three days and is associated with diarrhea and nausea. Pt denies any fever, vomiting, pain, burning with urination, increased urinary frequency, blood in urine, chest pain, shortness of breath, cough or any other acute complaints.     In ED, /69, , Temp 98.1 (17 Oct 2020 15:11)      PAST MEDICAL & SURGICAL HISTORY:  Calculus of kidney with calculus of ureter    Renal stone    HLD (hyperlipidemia)    HTN (hypertension)    History of cystoscopy  bilateral ureteral stent placement, lithotripsy    H/O unilateral oophorectomy    History of  section    History of cystoscopy  insertion of bilateral double J stent on 2020    < from: CT Abdomen and Pelvis w/ IV Cont (10.17.20 @ 13:52) >  IMPRESSION:  Pancolitis new since the previous exam.    Atrophic left kidney with severe hydronephrosis and hydroureter to level of a left adnexal or cervical mass. 5.6 x 3.9 x 7.0 cm left perinephric homogeneous in density with Hounsfield units of 60. This is indeterminant this may represent a perinephric hematoma, mass, or hemorrhagic cyst. New since the previous exam.    4.0 x 8.8 cm left adnexal or cervical mass. This is indeterminant. Malignancy is not excluded. Ultrasound evaluation is recommended.      < end of copied text >    < from: US Abdomen Complete (US Abdomen Complete .) (10.17.20 @ 18:16) >  IMPRESSION:    Atrophic left kidney with severe hydronephrosis and subcapsular hematoma as on CT. Findings suggest chronicity.    Fatty liver        < end of copied text >    Vital Signs Last 24 Hrs  T(C): 37.4 (17 Oct 2020 16:08), Max: 37.9 (17 Oct 2020 15:19)  T(F): 99.4 (17 Oct 2020 16:08), Max: 100.3 (17 Oct 2020 15:19)  HR: 111 (17 Oct 2020 15:19) (111 - 125)  BP: 102/71 (17 Oct 2020 15:19) (102/69 - 102/71)  BP(mean): --  RR: 18 (17 Oct 2020 15:19) (18 - 20)  SpO2: 98% (17 Oct 2020 15:19) (96% - 98%)                          11.7   17.84 )-----------( 287      ( 17 Oct 2020 11:54 )             34.8     10-17    134<L>  |  102  |  15  ----------------------------<  90  4.2   |  28  |  1.37<H>    Ca    8.7      17 Oct 2020 11:54    TPro  6.9  /  Alb  2.5<L>  /  TBili  0.6  /  DBili  x   /  AST  21  /  ALT  43  /  AlkPhos  119  10-17        PHYSICAL EXAM  Pt awake, alert, NAD  Respiratory: CTA B/L  CV: RRR, S1S2  Abdominal: Soft, NT, ND +BS, no CVA tenderness  Extremities: No edema, + peripheral pulses        ASSESSMENT/ PLAN:  case/CT/US findings discussed with Dr Batista  known h/o hydronephrosis  cont care as per primary team  iv abx as per med for colitis  hydrate, trend labs, serial CBC  Dr Batista to f/u

## 2020-10-17 NOTE — H&P ADULT - NSHPPHYSICALEXAM_GEN_ALL_CORE
General - NAD  Eyes - PERRLA, EOM intact  ENT - Oropharynx clear. Moist mucous membranes. Conjunctivae appear well perfused.   Neck - No noticeable or palpable swelling, redness or rash around throat or on face  Lymph Nodes - No lymphadenopathy  Cardiovascular - RRR no m/r/g, no JVD, no carotid bruits  Lungs - Clear to ascultation, no use of accessory muscles, no crackles or wheezes.  Skin - No rashes, skin warm and dry, no erythematous areas  Abdomen - Normal bowel sounds, abdomen soft and nontender, no hepatosplenomegaly.  Extremities - No edema, cyanosis or clubbing  Musculoskeletal - 5/5 strength, normal range of motion, no swollen or erythematous  joints.  Neurological – Alert and oriented x 3, CN 2-12 grossly intact.

## 2020-10-17 NOTE — H&P ADULT - NSICDXPASTSURGICALHX_GEN_ALL_CORE_FT
PAST SURGICAL HISTORY:  H/O unilateral oophorectomy     History of  section     History of cystoscopy insertion of bilateral double J stent on 2020    History of cystoscopy bilateral ureteral stent placement, lithotripsy

## 2020-10-17 NOTE — H&P ADULT - PROBLEM SELECTOR PLAN 5
RISK                                                          Points  [  ] Previous VTE                                                3  [  ] Thrombophilia                                             2  [  ] Lower limb paralysis                                   2        (unable to hold up >15 seconds)    [  ] Current Cancer                                             2         (within 6 months)  [ x ] Immobilization > 24 hrs                              1  [  ] ICU/CCU stay > 24 hours                             1  [ x ] Age > 60                                                         1    IMPROVE VTE Score: 2  heparin for VTE prophylaxis. c/w Simvastatin  f/u Lipid profile

## 2020-10-17 NOTE — H&P ADULT - ASSESSMENT
Pt will be admitted for Sepsis due to Colitis 65 F with PMH of HTN, HLD, renal stones s/p bilateral ureteral stents presents to ED with epigastric pain and diarrhea X 7 days.     Pt will be admitted for Sepsis due to Colitis

## 2020-10-17 NOTE — ED PROVIDER NOTE - PROGRESS NOTE DETAILS
labs preordered before arrival. results with triage vitals concerning for sepsis. will send lactate, blood cultures, antibiotics and get CT of abdomen. patient and daughter updated on results. Senthil Grewal patient and family updated on CT results. agree to plan for admission. patient reports knowledge of atrophic kidney. patient hemodynamically stable without hypotension. Senthil Grewal

## 2020-10-17 NOTE — H&P ADULT - PROBLEM SELECTOR PLAN 4
c/w Simvastatin  f/u Lipid profile On amlodipine at home  Will hold for now due to sepsis  Monitor BMP

## 2020-10-17 NOTE — ED PROVIDER NOTE - CLINICAL SUMMARY MEDICAL DECISION MAKING FREE TEXT BOX
65F presenting with several days of abdominal pain and diarrhea. has nausea but no vomiting. recently had stents placed for kidney stone. non-tender on exam but tachycardia and elevated white count. concern for sepsis vs viral illness. will get labs, CT scan. will reassess.

## 2020-10-17 NOTE — H&P ADULT - PROBLEM SELECTOR PLAN 1
p/w abdominal pain, diarrhea  WBC 17K, , Temp 100.3  Sepsis likely due to Pancolitis  CT A/P: 1. Pancolitis  2. Atrophic left kidney with severe hydronephrosis and hydroureter to level of a left adnexal or cervical mass. 5.6 x 3.9 x 7.0 cm left perinephric homogeneous in density with Hounsfield units of 60. This is indeterminant this may represent a perinephric hematoma, mass, or hemorrhagic cyst.   3. 4.0 x 8.8 cm left adnexal or cervical mass. This is indeterminant. Malignancy is not excluded. Ultrasound evaluation is recommended.  UA negative  CXR clear  started on Ciprofloxacin and Flagyl  c/w iv fluids  f/u Blood Cx and Urine Cx  Urology consult p/w abdominal pain, diarrhea  WBC 17K, , Temp 100.3  Sepsis likely due to Pancolitis  CT A/P:  1. Pancolitis  2. Atrophic left kidney with severe hydronephrosis and hydroureter to level of a left adnexal or cervical mass. 5.6 x 3.9 x 7.0 cm left perinephric homogeneous in density with Hounsfield units of 60. This is indeterminant this may represent a perinephric hematoma, mass, or hemorrhagic cyst.   3. 4.0 x 8.8 cm left adnexal or cervical mass. This is indeterminant. Malignancy is not excluded. Ultrasound evaluation is recommended.  UA negative  CXR clear  started on Ciprofloxacin and Flagyl  c/w iv fluids  f/u Blood Cx and Urine Cx  Urology consulted  ID p/w abdominal pain, diarrhea  WBC 17K, , Temp 100.3  Sepsis likely due to Pancolitis  CT A/P:  1. Pancolitis  2. Atrophic left kidney with severe hydronephrosis and hydroureter to level of a left adnexal or cervical mass. 5.6 x 3.9 x 7.0 cm left perinephric homogeneous in density with Hounsfield units of 60. This is indeterminant this may represent a perinephric hematoma, mass, or hemorrhagic cyst.   3. 4.0 x 8.8 cm left adnexal or cervical mass. This is indeterminant. Malignancy is not excluded. Ultrasound evaluation is recommended.  UA negative  started on Ciprofloxacin and Flagyl  c/w iv fluids  f/u Blood Cx and Urine Cx  Urology consulted  ID p/w abdominal pain, diarrhea  WBC 17K, , Temp 100.3  Sepsis likely due to Pancolitis  CT A/P:  1. Pancolitis  2. Atrophic left kidney with severe hydronephrosis and hydroureter to level of a left adnexal or cervical mass. 5.6 x 3.9 x 7.0 cm left perinephric homogeneous in density with Hounsfield units of 60. This is indeterminant this may represent a perinephric hematoma, mass, or hemorrhagic cyst.   3. 4.0 x 8.8 cm left adnexal or cervical mass. This is indeterminant. Malignancy is not excluded. Ultrasound evaluation is recommended.  UA negative  started on Ciprofloxacin and Flagyl  c/w iv fluids  f/u Blood Cx and Urine Cx  Urology consulted

## 2020-10-17 NOTE — H&P ADULT - HISTORY OF PRESENT ILLNESS
65 F with PMH of HTN, HLD, renal stones s/p bilateral ureteral stents presents to ED with abdominal pain X 7 days. Pt c/o of abdominal pain for the last 7 days which has got worse over the past three days and is associated with diarrhea and nausea. Pt denies any fever, vomiting, pain, burning with urination, increased urinary frequency, blood in urine, chest pain, shortness of breath, cough or any other acute complaints.     In ED, /69, , Temp 98.1 65 F with PMH of HTN, HLD, renal stones s/p bilateral ureteral stents presents to ED with epigastric pain and diarrhea X 7 days. Pt complains of   sharp epigastric pain, gets worse with eating, no relieving factors and is associated with diarrhea and nausea. Pt reports diarrhea for last 7 days, has 4-5 episodes of loose to semiformed stools per day. Pt denies any fever, vomiting, pain, burning with urination, increased urinary frequency, blood in urine, chest pain, shortness of breath, cough or any other acute complaints.     In ED, /69, , Temp 98.1

## 2020-10-17 NOTE — H&P ADULT - NSICDXPASTMEDICALHX_GEN_ALL_CORE_FT
PAST MEDICAL HISTORY:  Calculus of kidney with calculus of ureter     HLD (hyperlipidemia)     HTN (hypertension)     Renal stone

## 2020-10-18 LAB
24R-OH-CALCIDIOL SERPL-MCNC: 13.4 NG/ML — LOW (ref 30–80)
A1C WITH ESTIMATED AVERAGE GLUCOSE RESULT: 5.7 % — HIGH (ref 4–5.6)
ALBUMIN SERPL ELPH-MCNC: 2 G/DL — LOW (ref 3.5–5)
ALP SERPL-CCNC: 113 U/L — SIGNIFICANT CHANGE UP (ref 40–120)
ALT FLD-CCNC: 47 U/L DA — SIGNIFICANT CHANGE UP (ref 10–60)
ANION GAP SERPL CALC-SCNC: 6 MMOL/L — SIGNIFICANT CHANGE UP (ref 5–17)
AST SERPL-CCNC: 39 U/L — SIGNIFICANT CHANGE UP (ref 10–40)
BASOPHILS # BLD AUTO: 0.08 K/UL — SIGNIFICANT CHANGE UP (ref 0–0.2)
BASOPHILS NFR BLD AUTO: 0.5 % — SIGNIFICANT CHANGE UP (ref 0–2)
BILIRUB SERPL-MCNC: 0.7 MG/DL — SIGNIFICANT CHANGE UP (ref 0.2–1.2)
BUN SERPL-MCNC: 11 MG/DL — SIGNIFICANT CHANGE UP (ref 7–18)
CALCIUM SERPL-MCNC: 7.8 MG/DL — LOW (ref 8.4–10.5)
CANCER AG125 SERPL-ACNC: 10 U/ML — SIGNIFICANT CHANGE UP
CANCER AG125 SERPL-ACNC: 11 U/ML — SIGNIFICANT CHANGE UP
CHLORIDE SERPL-SCNC: 108 MMOL/L — SIGNIFICANT CHANGE UP (ref 96–108)
CO2 SERPL-SCNC: 24 MMOL/L — SIGNIFICANT CHANGE UP (ref 22–31)
CREAT SERPL-MCNC: 1.09 MG/DL — SIGNIFICANT CHANGE UP (ref 0.5–1.3)
CULTURE RESULTS: SIGNIFICANT CHANGE UP
EOSINOPHIL # BLD AUTO: 0.18 K/UL — SIGNIFICANT CHANGE UP (ref 0–0.5)
EOSINOPHIL NFR BLD AUTO: 1.1 % — SIGNIFICANT CHANGE UP (ref 0–6)
ESTIMATED AVERAGE GLUCOSE: 117 MG/DL — HIGH (ref 68–114)
GLUCOSE SERPL-MCNC: 107 MG/DL — HIGH (ref 70–99)
HCT VFR BLD CALC: 31.1 % — LOW (ref 34.5–45)
HGB BLD-MCNC: 10.1 G/DL — LOW (ref 11.5–15.5)
IMM GRANULOCYTES NFR BLD AUTO: 1.6 % — HIGH (ref 0–1.5)
LYMPHOCYTES # BLD AUTO: 1.69 K/UL — SIGNIFICANT CHANGE UP (ref 1–3.3)
LYMPHOCYTES # BLD AUTO: 10.8 % — LOW (ref 13–44)
MAGNESIUM SERPL-MCNC: 1.7 MG/DL — SIGNIFICANT CHANGE UP (ref 1.6–2.6)
MCHC RBC-ENTMCNC: 31.4 PG — SIGNIFICANT CHANGE UP (ref 27–34)
MCHC RBC-ENTMCNC: 32.5 GM/DL — SIGNIFICANT CHANGE UP (ref 32–36)
MCV RBC AUTO: 96.6 FL — SIGNIFICANT CHANGE UP (ref 80–100)
MONOCYTES # BLD AUTO: 1.88 K/UL — HIGH (ref 0–0.9)
MONOCYTES NFR BLD AUTO: 12 % — SIGNIFICANT CHANGE UP (ref 2–14)
NEUTROPHILS # BLD AUTO: 11.59 K/UL — HIGH (ref 1.8–7.4)
NEUTROPHILS NFR BLD AUTO: 74 % — SIGNIFICANT CHANGE UP (ref 43–77)
NRBC # BLD: 0 /100 WBCS — SIGNIFICANT CHANGE UP (ref 0–0)
PHOSPHATE SERPL-MCNC: 2.7 MG/DL — SIGNIFICANT CHANGE UP (ref 2.5–4.5)
PLATELET # BLD AUTO: 261 K/UL — SIGNIFICANT CHANGE UP (ref 150–400)
POTASSIUM SERPL-MCNC: 3.8 MMOL/L — SIGNIFICANT CHANGE UP (ref 3.5–5.3)
POTASSIUM SERPL-SCNC: 3.8 MMOL/L — SIGNIFICANT CHANGE UP (ref 3.5–5.3)
PROT SERPL-MCNC: 5.6 G/DL — LOW (ref 6–8.3)
RBC # BLD: 3.22 M/UL — LOW (ref 3.8–5.2)
RBC # FLD: 12.7 % — SIGNIFICANT CHANGE UP (ref 10.3–14.5)
SARS-COV-2 IGG SERPL QL IA: NEGATIVE — SIGNIFICANT CHANGE UP
SARS-COV-2 IGM SERPL IA-ACNC: <0.1 INDEX — SIGNIFICANT CHANGE UP
SODIUM SERPL-SCNC: 138 MMOL/L — SIGNIFICANT CHANGE UP (ref 135–145)
SPECIMEN SOURCE: SIGNIFICANT CHANGE UP
TSH SERPL-MCNC: 2.48 UU/ML — SIGNIFICANT CHANGE UP (ref 0.34–4.82)
WBC # BLD: 15.67 K/UL — HIGH (ref 3.8–10.5)
WBC # FLD AUTO: 15.67 K/UL — HIGH (ref 3.8–10.5)

## 2020-10-18 RX ORDER — SIMETHICONE 80 MG/1
80 TABLET, CHEWABLE ORAL ONCE
Refills: 0 | Status: COMPLETED | OUTPATIENT
Start: 2020-10-18 | End: 2020-10-18

## 2020-10-18 RX ADMIN — HEPARIN SODIUM 5000 UNIT(S): 5000 INJECTION INTRAVENOUS; SUBCUTANEOUS at 13:33

## 2020-10-18 RX ADMIN — HEPARIN SODIUM 5000 UNIT(S): 5000 INJECTION INTRAVENOUS; SUBCUTANEOUS at 05:31

## 2020-10-18 RX ADMIN — Medication 650 MILLIGRAM(S): at 05:31

## 2020-10-18 RX ADMIN — SIMVASTATIN 40 MILLIGRAM(S): 20 TABLET, FILM COATED ORAL at 21:41

## 2020-10-18 RX ADMIN — SIMETHICONE 80 MILLIGRAM(S): 80 TABLET, CHEWABLE ORAL at 22:34

## 2020-10-18 RX ADMIN — Medication 200 MILLIGRAM(S): at 06:52

## 2020-10-18 RX ADMIN — Medication 650 MILLIGRAM(S): at 03:53

## 2020-10-18 RX ADMIN — Medication 100 MILLIGRAM(S): at 05:30

## 2020-10-18 RX ADMIN — Medication 100 MILLIGRAM(S): at 21:42

## 2020-10-18 RX ADMIN — Medication 100 MILLIGRAM(S): at 13:33

## 2020-10-18 RX ADMIN — HEPARIN SODIUM 5000 UNIT(S): 5000 INJECTION INTRAVENOUS; SUBCUTANEOUS at 21:42

## 2020-10-18 RX ADMIN — Medication 200 MILLIGRAM(S): at 17:41

## 2020-10-18 NOTE — PROGRESS NOTE ADULT - SUBJECTIVE AND OBJECTIVE BOX
Patient is a 65y old  Female who presents with a chief complaint of Abdominal pain (17 Oct 2020 17:26)    PATIENT IS SEEN AND EXAMINED IN MEDICAL FLOOR.      ALLERGIES:  No Known Allergies      Daily Height in cm: 165.1 (17 Oct 2020 11:03)    Daily Weight in k.5 (18 Oct 2020 04:58)    VITALS:    Vital Signs Last 24 Hrs  T(C): 37.2 (18 Oct 2020 04:58), Max: 38 (18 Oct 2020 03:27)  T(F): 98.9 (18 Oct 2020 04:58), Max: 100.4 (18 Oct 2020 03:27)  HR: 97 (18 Oct 2020 04:58) (97 - 125)  BP: 103/59 (18 Oct 2020 04:58) (99/60 - 112/62)  BP(mean): --  RR: 18 (18 Oct 2020 04:58) (18 - 20)  SpO2: 95% (18 Oct 2020 04:58) (95% - 98%)    LABS:    CBC Full  -  ( 18 Oct 2020 06:51 )  WBC Count : 15.67 K/uL  RBC Count : 3.22 M/uL  Hemoglobin : 10.1 g/dL  Hematocrit : 31.1 %  Platelet Count - Automated : 261 K/uL  Mean Cell Volume : 96.6 fl  Mean Cell Hemoglobin : 31.4 pg  Mean Cell Hemoglobin Concentration : 32.5 gm/dL  Auto Neutrophil # : x  Auto Lymphocyte # : x  Auto Monocyte # : x  Auto Eosinophil # : x  Auto Basophil # : x  Auto Neutrophil % : x  Auto Lymphocyte % : x  Auto Monocyte % : x  Auto Eosinophil % : x  Auto Basophil % : x      10-18    138  |  108  |  11  ----------------------------<  107<H>  3.8   |  24  |  1.09    Ca    7.8<L>      18 Oct 2020 06:51  Phos  2.7     10-18  Mg     1.7     10-18    TPro  5.6<L>  /  Alb  2.0<L>  /  TBili  0.7  /  DBili  x   /  AST  39  /  ALT  47  /  AlkPhos  113  10-18    CAPILLARY BLOOD GLUCOSE            LIVER FUNCTIONS - ( 18 Oct 2020 06:51 )  Alb: 2.0 g/dL / Pro: 5.6 g/dL / ALK PHOS: 113 U/L / ALT: 47 U/L DA / AST: 39 U/L / GGT: x           Creatinine Trend: 1.09<--, 1.37<--, 1.23<--  I&O's Summary          .Urine Clean Catch (Midstream)   @ 22:17   <10,000 CFU/mL Normal Urogenital Amna  --  --      .Urine Catheterized   @ 03:19   No growth  --  --      .Urine Clean Catch (Midstream)   @ 00:52   <10,000 CFU/mL Normal Urogenital Amna  --  --      .Urine Clean Catch (Midstream)   @ 17:44   <10,000 CFU/mL Normal Urogenital Amna  --  --      .Blood Blood-Peripheral   @ 17:42   No Growth Final  --  --          MEDICATIONS:    MEDICATIONS  (STANDING):  ciprofloxacin   IVPB 400 milliGRAM(s) IV Intermittent every 12 hours  heparin   Injectable 5000 Unit(s) SubCutaneous every 8 hours  metroNIDAZOLE  IVPB 500 milliGRAM(s) IV Intermittent every 8 hours  simvastatin 40 milliGRAM(s) Oral at bedtime  sodium chloride 0.9%. 1000 milliLiter(s) (80 mL/Hr) IV Continuous <Continuous>      MEDICATIONS  (PRN):  acetaminophen   Tablet .. 650 milliGRAM(s) Oral every 6 hours PRN Temp greater or equal to 38C (100.4F), Mild Pain (1 - 3)        REVIEW OF SYSTEMS:                           ALL ROS DONE [ X   ]      CONSTITUTIONAL:  LETHARGIC [   ], FEVER [   ], UNRESPONSIVE [   ]  CVS:  CP  [   ], SOB, [   ], PALPITATIONS [   ], DIZZYNESS [   ]  RS: COUGH [   ], SPUTUM [   ]  GI: ABDOMINAL PAIN [   ], NAUSEA [   ], VOMITINGS [   ], DIARRHEA [   ], CONSTIPATION [   ]  :  DYSURIA [   ], NOCTURIA [   ], INCREASED FREQUENCY [   ], DRIBLING [   ],  SKELETAL: PAINFUL JOINTS [   ], SWOLLEN JOINTS [   ], NECK ACHE [   ], LOW BACK ACHE [   ],  SKIN : ULCERS [   ], RASH [   ], ITCHING [   ]  CNS: HEAD ACHE [   ], DOUBLE VISION [   ], BLURRED VISION [   ], AMS / CONFUSION [   ], SEIZURES [   ], WEAKNESS [   ],TINGLING / NUMBNESS [   ]      PHYSICAL EXAMINATION:    GENERAL APPEARANCE: NO DISTRESS  HEENT:  NO PALLOR, NO  JVD,  NO   NODES, NECK SUPPLE  CVS: S1 +, S2 +,   RS: AEEB,  OCCASIONAL  RALES +,   NO RONCHI  ABD: SOFT, NT, NO, BS +  EXT: NO PE  SKIN: WARM,   SKELETAL:  ROM ACCEPTABLE  CNS:  AAO X 3   , NO  DEFICITS        RADIOLOGY :      < from: CT Abdomen and Pelvis w/ IV Cont (10.17.20 @ 13:52) >    IMPRESSION:  Pancolitis new since the previous exam.    Atrophic left kidney with severe hydronephrosis and hydroureter to level of a left adnexal or cervical mass. 5.6 x 3.9 x 7.0 cm left perinephric homogeneous in density with Hounsfield units of 60. This is indeterminant this may represent a perinephric hematoma, mass, or hemorrhagic cyst. New since the previous exam.    4.0 x 8.8 cm left adnexal or cervical mass. This is indeterminant. Malignancy is not excluded. Ultrasound evaluation is recommended.    < end of copied text >      < from: US Pelvis Complete (US Pelvis Complete .) (10.17.20 @ 18:19) >    IMPRESSION:    4.5 cm left cervical mass extending into the left parametrium.    < end of copied text >      < from: US Abdomen Complete (US Abdomen Complete .) (10.17.20 @ 18:16) >  IMPRESSION:    Atrophic left kidney with severe hydronephrosis and subcapsular hematoma as on CT. Findings suggest chronicity.    Fatty liver    < end of copied text >        ASSESSMENT :     Noninfectious gastroenteritis    Calculus of kidney with calculus of ureter    Renal stone    HLD (hyperlipidemia)    HTN (hypertension)    History of cystoscopy    H/O unilateral oophorectomy    History of  section    History of cystoscopy        PLAN:  HPI:  65 F with PMH of HTN, HLD, renal stones s/p bilateral ureteral stents presents to ED with epigastric pain and diarrhea X 7 days. Pt complains of   sharp epigastric pain, gets worse with eating, no relieving factors and is associated with diarrhea and nausea. Pt reports diarrhea for last 7 days, has 4-5 episodes of loose to semiformed stools per day. Pt denies any fever, vomiting, pain, burning with urination, increased urinary frequency, blood in urine, chest pain, shortness of breath, cough or any other acute complaints.     In ED, /69, , Temp 98.1 (17 Oct 2020 15:11)      - ABDOMINAL COLIC, PANCOLITIS - ON CIPROFLOXACIN AND FLAGYL  - SUSPECT CERVICAL CANCER, LEFT ATROPHIC KIDNEY WITH LEFT HYDRONEPHROSIS AND LEFT HYDROURETER. OBTAIN GYN CONSULT AND TRANS VAGINAL U/S ( REVIEWED ) ,  MARKER TO F/UP  - UROLOGY CONSULT AND ONCOLOGY CONSULT DR. MATA TO F/UP  - GI AND DVT PROPHYLAXIS  - DR. EDGAR HERNANDEZ.    Patient is a 65y old  Female who presents with a chief complaint of Abdominal pain (17 Oct 2020 17:26)    PATIENT IS SEEN AND EXAMINED IN MEDICAL FLOOR.      ALLERGIES:  No Known Allergies      Daily Height in cm: 165.1 (17 Oct 2020 11:03)    Daily Weight in k.5 (18 Oct 2020 04:58)    VITALS:    Vital Signs Last 24 Hrs  T(C): 37.2 (18 Oct 2020 04:58), Max: 38 (18 Oct 2020 03:27)  T(F): 98.9 (18 Oct 2020 04:58), Max: 100.4 (18 Oct 2020 03:27)  HR: 97 (18 Oct 2020 04:58) (97 - 125)  BP: 103/59 (18 Oct 2020 04:58) (99/60 - 112/62)  BP(mean): --  RR: 18 (18 Oct 2020 04:58) (18 - 20)  SpO2: 95% (18 Oct 2020 04:58) (95% - 98%)    LABS:    CBC Full  -  ( 18 Oct 2020 06:51 )  WBC Count : 15.67 K/uL  RBC Count : 3.22 M/uL  Hemoglobin : 10.1 g/dL  Hematocrit : 31.1 %  Platelet Count - Automated : 261 K/uL  Mean Cell Volume : 96.6 fl  Mean Cell Hemoglobin : 31.4 pg  Mean Cell Hemoglobin Concentration : 32.5 gm/dL  Auto Neutrophil # : x  Auto Lymphocyte # : x  Auto Monocyte # : x  Auto Eosinophil # : x  Auto Basophil # : x  Auto Neutrophil % : x  Auto Lymphocyte % : x  Auto Monocyte % : x  Auto Eosinophil % : x  Auto Basophil % : x      10-18    138  |  108  |  11  ----------------------------<  107<H>  3.8   |  24  |  1.09    Ca    7.8<L>      18 Oct 2020 06:51  Phos  2.7     10-18  Mg     1.7     10-18    TPro  5.6<L>  /  Alb  2.0<L>  /  TBili  0.7  /  DBili  x   /  AST  39  /  ALT  47  /  AlkPhos  113  10-18    CAPILLARY BLOOD GLUCOSE            LIVER FUNCTIONS - ( 18 Oct 2020 06:51 )  Alb: 2.0 g/dL / Pro: 5.6 g/dL / ALK PHOS: 113 U/L / ALT: 47 U/L DA / AST: 39 U/L / GGT: x           Creatinine Trend: 1.09<--, 1.37<--, 1.23<--  I&O's Summary          .Urine Clean Catch (Midstream)   @ 22:17   <10,000 CFU/mL Normal Urogenital Amna  --  --      .Urine Catheterized   @ 03:19   No growth  --  --      .Urine Clean Catch (Midstream)   @ 00:52   <10,000 CFU/mL Normal Urogenital Amna  --  --      .Urine Clean Catch (Midstream)   @ 17:44   <10,000 CFU/mL Normal Urogenital Amna  --  --      .Blood Blood-Peripheral   @ 17:42   No Growth Final  --  --          MEDICATIONS:    MEDICATIONS  (STANDING):  ciprofloxacin   IVPB 400 milliGRAM(s) IV Intermittent every 12 hours  heparin   Injectable 5000 Unit(s) SubCutaneous every 8 hours  metroNIDAZOLE  IVPB 500 milliGRAM(s) IV Intermittent every 8 hours  simvastatin 40 milliGRAM(s) Oral at bedtime  sodium chloride 0.9%. 1000 milliLiter(s) (80 mL/Hr) IV Continuous <Continuous>      MEDICATIONS  (PRN):  acetaminophen   Tablet .. 650 milliGRAM(s) Oral every 6 hours PRN Temp greater or equal to 38C (100.4F), Mild Pain (1 - 3)        REVIEW OF SYSTEMS:                           ALL ROS DONE [ X   ]      CONSTITUTIONAL:  LETHARGIC [   ], FEVER [   ], UNRESPONSIVE [   ]  CVS:  CP  [   ], SOB, [   ], PALPITATIONS [   ], DIZZYNESS [   ]  RS: COUGH [   ], SPUTUM [   ]  GI: ABDOMINAL PAIN [   ], NAUSEA [   ], VOMITINGS [   ], DIARRHEA [   ], CONSTIPATION [   ]  :  DYSURIA [   ], NOCTURIA [   ], INCREASED FREQUENCY [   ], DRIBLING [   ],  SKELETAL: PAINFUL JOINTS [   ], SWOLLEN JOINTS [   ], NECK ACHE [   ], LOW BACK ACHE [   ],  SKIN : ULCERS [   ], RASH [   ], ITCHING [   ]  CNS: HEAD ACHE [   ], DOUBLE VISION [   ], BLURRED VISION [   ], AMS / CONFUSION [   ], SEIZURES [   ], WEAKNESS [   ],TINGLING / NUMBNESS [   ]      PHYSICAL EXAMINATION:    GENERAL APPEARANCE: NO DISTRESS  HEENT:  NO PALLOR, NO  JVD,  NO   NODES, NECK SUPPLE  CVS: S1 +, S2 +,   RS: AEEB,  OCCASIONAL  RALES +,   NO RONCHI  ABD: SOFT, NT, NO, BS +  EXT: NO PE  SKIN: WARM,   SKELETAL:  ROM ACCEPTABLE  CNS:  AAO X 3   , NO  DEFICITS        RADIOLOGY :      < from: CT Abdomen and Pelvis w/ IV Cont (10.17.20 @ 13:52) >    IMPRESSION:  Pancolitis new since the previous exam.    Atrophic left kidney with severe hydronephrosis and hydroureter to level of a left adnexal or cervical mass. 5.6 x 3.9 x 7.0 cm left perinephric homogeneous in density with Hounsfield units of 60. This is indeterminant this may represent a perinephric hematoma, mass, or hemorrhagic cyst. New since the previous exam.    4.0 x 8.8 cm left adnexal or cervical mass. This is indeterminant. Malignancy is not excluded. Ultrasound evaluation is recommended.    < end of copied text >      < from: US Pelvis Complete (US Pelvis Complete .) (10.17.20 @ 18:19) >    IMPRESSION:    4.5 cm left cervical mass extending into the left parametrium.    < end of copied text >      < from: US Abdomen Complete (US Abdomen Complete .) (10.17.20 @ 18:16) >  IMPRESSION:    Atrophic left kidney with severe hydronephrosis and subcapsular hematoma as on CT. Findings suggest chronicity.    Fatty liver    < end of copied text >        ASSESSMENT :     Noninfectious gastroenteritis    Calculus of kidney with calculus of ureter    Renal stone    HLD (hyperlipidemia)    HTN (hypertension)    History of cystoscopy    H/O unilateral oophorectomy    History of  section    History of cystoscopy        PLAN:  HPI:  65 F with PMH of HTN, HLD, renal stones s/p bilateral ureteral stents presents to ED with epigastric pain and diarrhea X 7 days. Pt complains of   sharp epigastric pain, gets worse with eating, no relieving factors and is associated with diarrhea and nausea. Pt reports diarrhea for last 7 days, has 4-5 episodes of loose to semiformed stools per day. Pt denies any fever, vomiting, pain, burning with urination, increased urinary frequency, blood in urine, chest pain, shortness of breath, cough or any other acute complaints.     In ED, /69, , Temp 98.1 (17 Oct 2020 15:11)    - GYN TO EVALUATE FOR CERVICAL  Bx TO EVALUATE CERVICAL MASS. ( TO CALL DR. MATA IN AM )   - ABDOMINAL COLIC, PANCOLITIS - ON CIPROFLOXACIN AND FLAGYL  - SUSPECT CERVICAL CANCER, LEFT ATROPHIC KIDNEY WITH LEFT HYDRONEPHROSIS AND LEFT HYDROURETER. OBTAIN GYN CONSULT AND TRANS VAGINAL U/S ( REVIEWED ) ,  MARKER TO F/UP  - UROLOGY CONSULT AND ONCOLOGY CONSULT DR. MATA TO F/UP  - GI AND DVT PROPHYLAXIS  - DR. EDGAR HERNANDEZ.

## 2020-10-18 NOTE — PROGRESS NOTE ADULT - SUBJECTIVE AND OBJECTIVE BOX
INTERVAL HPI/OVERNIGHT EVENTS:  Pt resting comfortably. No acute complaints  Denies N/V, hematuria    MEDICATIONS  (STANDING):  ciprofloxacin   IVPB 400 milliGRAM(s) IV Intermittent every 12 hours  heparin   Injectable 5000 Unit(s) SubCutaneous every 8 hours  metroNIDAZOLE  IVPB 500 milliGRAM(s) IV Intermittent every 8 hours  simvastatin 40 milliGRAM(s) Oral at bedtime  sodium chloride 0.9%. 1000 milliLiter(s) (80 mL/Hr) IV Continuous <Continuous>    MEDICATIONS  (PRN):  acetaminophen   Tablet .. 650 milliGRAM(s) Oral every 6 hours PRN Temp greater or equal to 38C (100.4F), Mild Pain (1 - 3)    Vital Signs Last 24 Hrs  T(C): 37.2 (18 Oct 2020 04:58), Max: 38 (18 Oct 2020 03:27)  T(F): 98.9 (18 Oct 2020 04:58), Max: 100.4 (18 Oct 2020 03:27)  HR: 97 (18 Oct 2020 04:58) (97 - 125)  BP: 103/59 (18 Oct 2020 04:58) (99/60 - 112/62)  BP(mean): --  RR: 18 (18 Oct 2020 04:58) (18 - 20)  SpO2: 95% (18 Oct 2020 04:58) (95% - 98%)    Physical:  General: A&Ox3. NAD.  Chest: respirations unlabored  Abdomen: Soft nondistended, nontender. No suprapubic tenderness. No CVAT    I&O's Detail      LABS:                        10.1   15.67 )-----------( 261      ( 18 Oct 2020 06:51 )             31.1             10-18    138  |  108  |  11  ----------------------------<  107<H>  3.8   |  24  |  1.09    Ca    7.8<L>      18 Oct 2020 06:51  Phos  2.7     10-18  Mg     1.7     10-18    TPro  5.6<L>  /  Alb  2.0<L>  /  TBili  0.7  /  DBili  x   /  AST  39  /  ALT  47  /  AlkPhos  113  10-18   INTERVAL HPI/OVERNIGHT EVENTS:  Pt resting comfortably. No acute complaints  Denies N/V, hematuria    MEDICATIONS  (STANDING):  ciprofloxacin   IVPB 400 milliGRAM(s) IV Intermittent every 12 hours  heparin   Injectable 5000 Unit(s) SubCutaneous every 8 hours  metroNIDAZOLE  IVPB 500 milliGRAM(s) IV Intermittent every 8 hours  simvastatin 40 milliGRAM(s) Oral at bedtime  sodium chloride 0.9%. 1000 milliLiter(s) (80 mL/Hr) IV Continuous <Continuous>    MEDICATIONS  (PRN):  acetaminophen   Tablet .. 650 milliGRAM(s) Oral every 6 hours PRN Temp greater or equal to 38C (100.4F), Mild Pain (1 - 3)    Vital Signs Last 24 Hrs  T(C): 37.2 (18 Oct 2020 04:58), Max: 38 (18 Oct 2020 03:27)  T(F): 98.9 (18 Oct 2020 04:58), Max: 100.4 (18 Oct 2020 03:27)  HR: 97 (18 Oct 2020 04:58) (97 - 125)  BP: 103/59 (18 Oct 2020 04:58) (99/60 - 112/62)  BP(mean): --  RR: 18 (18 Oct 2020 04:58) (18 - 20)  SpO2: 95% (18 Oct 2020 04:58) (95% - 98%)    Physical:  General: A&Ox3. NAD.  Chest: respirations unlabored  Abdomen: Soft nondistended, nontender. No suprapubic tenderness. No CVAT    LABS:                        10.1   15.67 )-----------( 261      ( 18 Oct 2020 06:51 )             31.1             10-18    138  |  108  |  11  ----------------------------<  107<H>  3.8   |  24  |  1.09    Ca    7.8<L>      18 Oct 2020 06:51  Phos  2.7     10-18  Mg     1.7     10-18    TPro  5.6<L>  /  Alb  2.0<L>  /  TBili  0.7  /  DBili  x   /  AST  39  /  ALT  47  /  AlkPhos  113  10-18

## 2020-10-18 NOTE — PROGRESS NOTE ADULT - ASSESSMENT
65 F with renal calculi s/p b/l stent placement and removal now presenting with colitis and CT findings of atrophic kidney with severe hydronephrosis/hydroureter at the level of adnexal mass     -Continue with antibiotics for colitis  -Monitor Cr, urine output   -GYN reccs for CT findings of adnexal/cervical mass  -Dr. Batista to see patient    65 F with renal calculi s/p b/l stent placement and removal now presenting with colitis and CT findings of atrophic kidney with severe hydronephrosis/hydroureter at the level of adnexal mass     -Continue with antibiotics for colitis  -Monitor Cr, urine output   -GYN reccs for CT findings of adnexal/cervical mass

## 2020-10-19 DIAGNOSIS — Z02.9 ENCOUNTER FOR ADMINISTRATIVE EXAMINATIONS, UNSPECIFIED: ICD-10-CM

## 2020-10-19 DIAGNOSIS — K51.00 ULCERATIVE (CHRONIC) PANCOLITIS WITHOUT COMPLICATIONS: ICD-10-CM

## 2020-10-19 DIAGNOSIS — K52.9 NONINFECTIVE GASTROENTERITIS AND COLITIS, UNSPECIFIED: ICD-10-CM

## 2020-10-19 DIAGNOSIS — Z71.89 OTHER SPECIFIED COUNSELING: ICD-10-CM

## 2020-10-19 LAB
ALBUMIN SERPL ELPH-MCNC: 1.9 G/DL — LOW (ref 3.5–5)
ALP SERPL-CCNC: 124 U/L — HIGH (ref 40–120)
ALT FLD-CCNC: 72 U/L DA — HIGH (ref 10–60)
ANION GAP SERPL CALC-SCNC: 6 MMOL/L — SIGNIFICANT CHANGE UP (ref 5–17)
AST SERPL-CCNC: 53 U/L — HIGH (ref 10–40)
BASOPHILS # BLD AUTO: 0 K/UL — SIGNIFICANT CHANGE UP (ref 0–0.2)
BASOPHILS NFR BLD AUTO: 0 % — SIGNIFICANT CHANGE UP (ref 0–2)
BILIRUB SERPL-MCNC: 0.4 MG/DL — SIGNIFICANT CHANGE UP (ref 0.2–1.2)
BUN SERPL-MCNC: 7 MG/DL — SIGNIFICANT CHANGE UP (ref 7–18)
CALCIUM SERPL-MCNC: 8.1 MG/DL — LOW (ref 8.4–10.5)
CHLORIDE SERPL-SCNC: 107 MMOL/L — SIGNIFICANT CHANGE UP (ref 96–108)
CO2 SERPL-SCNC: 24 MMOL/L — SIGNIFICANT CHANGE UP (ref 22–31)
CREAT SERPL-MCNC: 0.96 MG/DL — SIGNIFICANT CHANGE UP (ref 0.5–1.3)
EOSINOPHIL # BLD AUTO: 0.54 K/UL — HIGH (ref 0–0.5)
EOSINOPHIL NFR BLD AUTO: 4 % — SIGNIFICANT CHANGE UP (ref 0–6)
GLUCOSE SERPL-MCNC: 91 MG/DL — SIGNIFICANT CHANGE UP (ref 70–99)
HCT VFR BLD CALC: 31.8 % — LOW (ref 34.5–45)
HGB BLD-MCNC: 10.4 G/DL — LOW (ref 11.5–15.5)
LYMPHOCYTES # BLD AUTO: 15 % — SIGNIFICANT CHANGE UP (ref 13–44)
LYMPHOCYTES # BLD AUTO: 2.02 K/UL — SIGNIFICANT CHANGE UP (ref 1–3.3)
MAGNESIUM SERPL-MCNC: 2.1 MG/DL — SIGNIFICANT CHANGE UP (ref 1.6–2.6)
MANUAL SMEAR VERIFICATION: SIGNIFICANT CHANGE UP
MCHC RBC-ENTMCNC: 31.2 PG — SIGNIFICANT CHANGE UP (ref 27–34)
MCHC RBC-ENTMCNC: 32.7 GM/DL — SIGNIFICANT CHANGE UP (ref 32–36)
MCV RBC AUTO: 95.5 FL — SIGNIFICANT CHANGE UP (ref 80–100)
MONOCYTES # BLD AUTO: 1.62 K/UL — HIGH (ref 0–0.9)
MONOCYTES NFR BLD AUTO: 12 % — SIGNIFICANT CHANGE UP (ref 2–14)
MYELOCYTES NFR BLD: 1 % — HIGH (ref 0–0)
NEUTROPHILS # BLD AUTO: 9.17 K/UL — HIGH (ref 1.8–7.4)
NEUTROPHILS NFR BLD AUTO: 65 % — SIGNIFICANT CHANGE UP (ref 43–77)
NEUTS BAND # BLD: 3 % — SIGNIFICANT CHANGE UP (ref 0–8)
NRBC # BLD: 0 /100 — SIGNIFICANT CHANGE UP (ref 0–0)
PHOSPHATE SERPL-MCNC: 2 MG/DL — LOW (ref 2.5–4.5)
PLAT MORPH BLD: NORMAL — SIGNIFICANT CHANGE UP
PLATELET # BLD AUTO: 294 K/UL — SIGNIFICANT CHANGE UP (ref 150–400)
POTASSIUM SERPL-MCNC: 3.7 MMOL/L — SIGNIFICANT CHANGE UP (ref 3.5–5.3)
POTASSIUM SERPL-SCNC: 3.7 MMOL/L — SIGNIFICANT CHANGE UP (ref 3.5–5.3)
PROT SERPL-MCNC: 5.6 G/DL — LOW (ref 6–8.3)
RBC # BLD: 3.33 M/UL — LOW (ref 3.8–5.2)
RBC # FLD: 12.9 % — SIGNIFICANT CHANGE UP (ref 10.3–14.5)
RBC BLD AUTO: NORMAL — SIGNIFICANT CHANGE UP
SODIUM SERPL-SCNC: 137 MMOL/L — SIGNIFICANT CHANGE UP (ref 135–145)
WBC # BLD: 13.49 K/UL — HIGH (ref 3.8–10.5)
WBC # FLD AUTO: 13.49 K/UL — HIGH (ref 3.8–10.5)

## 2020-10-19 RX ORDER — CHOLECALCIFEROL (VITAMIN D3) 125 MCG
2000 CAPSULE ORAL DAILY
Refills: 0 | Status: DISCONTINUED | OUTPATIENT
Start: 2020-10-19 | End: 2020-10-20

## 2020-10-19 RX ADMIN — Medication 100 MILLIGRAM(S): at 21:23

## 2020-10-19 RX ADMIN — HEPARIN SODIUM 5000 UNIT(S): 5000 INJECTION INTRAVENOUS; SUBCUTANEOUS at 05:47

## 2020-10-19 RX ADMIN — Medication 200 MILLIGRAM(S): at 05:48

## 2020-10-19 RX ADMIN — Medication 200 MILLIGRAM(S): at 17:54

## 2020-10-19 RX ADMIN — Medication 100 MILLIGRAM(S): at 14:08

## 2020-10-19 RX ADMIN — Medication 100 MILLIGRAM(S): at 05:47

## 2020-10-19 RX ADMIN — SIMVASTATIN 40 MILLIGRAM(S): 20 TABLET, FILM COATED ORAL at 21:23

## 2020-10-19 RX ADMIN — HEPARIN SODIUM 5000 UNIT(S): 5000 INJECTION INTRAVENOUS; SUBCUTANEOUS at 14:08

## 2020-10-19 RX ADMIN — HEPARIN SODIUM 5000 UNIT(S): 5000 INJECTION INTRAVENOUS; SUBCUTANEOUS at 21:23

## 2020-10-19 NOTE — CONSULT NOTE ADULT - SUBJECTIVE AND OBJECTIVE BOX
Patient is a 65y old  Female who presents with a chief complaint of Abdominal pain and diarrhea (19 Oct 2020 14:09)      HPI:  65 F with PMH of HTN, HLD, renal stones s/p bilateral ureteral stents presents to ED with epigastric pain and diarrhea X 7 days. Pt complains of   sharp epigastric pain, gets worse with eating, no relieving factors and is associated with diarrhea and nausea. Pt reports diarrhea for last 7 days, has 4-5 episodes of loose to semiformed stools per day. Pt denies any fever, vomiting, pain, burning with urination, increased urinary frequency, blood in urine, chest pain, shortness of breath, cough or any other acute complaints. she did take antibiotics at home recently.  there is no bleeding rectally or from vagina.    In ED, /69, , Temp 98.1 (17 Oct 2020 15:11)       ROS:  Negative except for:    PAST MEDICAL & SURGICAL HISTORY:  Calculus of kidney with calculus of ureter    Renal stone    HLD (hyperlipidemia)    HTN (hypertension)    History of cystoscopy  bilateral ureteral stent placement, lithotripsy    H/O unilateral oophorectomy    History of  section    History of cystoscopy  insertion of bilateral double J stent on 2020        SOCIAL HISTORY:    FAMILY HISTORY:  No pertinent family history in first degree relatives        MEDICATIONS  (STANDING):  ciprofloxacin   IVPB 400 milliGRAM(s) IV Intermittent every 12 hours  heparin   Injectable 5000 Unit(s) SubCutaneous every 8 hours  metroNIDAZOLE  IVPB 500 milliGRAM(s) IV Intermittent every 8 hours  simvastatin 40 milliGRAM(s) Oral at bedtime  sodium chloride 0.9%. 1000 milliLiter(s) (80 mL/Hr) IV Continuous <Continuous>    MEDICATIONS  (PRN):  acetaminophen   Tablet .. 650 milliGRAM(s) Oral every 6 hours PRN Temp greater or equal to 38C (100.4F), Mild Pain (1 - 3)      Allergies    No Known Allergies    Intolerances        Vital Signs Last 24 Hrs  T(C): 36.7 (19 Oct 2020 13:43), Max: 36.8 (19 Oct 2020 04:41)  T(F): 98.1 (19 Oct 2020 13:43), Max: 98.3 (19 Oct 2020 04:41)  HR: 99 (19 Oct 2020 13:43) (94 - 99)  BP: 99/72 (19 Oct 2020 13:43) (99/72 - 116/75)  BP(mean): --  RR: 17 (19 Oct 2020 13:43) (17 - 18)  SpO2: 97% (19 Oct 2020 13:43) (97% - 99%)    PHYSICAL EXAM  General: adult in NAD  HEENT: clear oropharynx, anicteric sclera, pink conjunctiva  Neck: supple  CV: normal S1/S2 with no murmur rubs or gallops  Lungs: positive air movement b/l ant lungs,clear to auscultation, no wheezes, no rales  Abdomen: soft non-tender non-distended, no hepatosplenomegaly  Ext: no clubbing cyanosis or edema  Skin: no rashes and no petechiae  Neuro: alert and oriented X 4, no focal deficits      LABS:                          10.4   13.49 )-----------( 294      ( 19 Oct 2020 07:10 )             31.8         Mean Cell Volume : 95.5 fl  Mean Cell Hemoglobin : 31.2 pg  Mean Cell Hemoglobin Concentration : 32.7 gm/dL  Auto Neutrophil # : 9.17 K/uL  Auto Lymphocyte # : 2.02 K/uL  Auto Monocyte # : 1.62 K/uL  Auto Eosinophil # : 0.54 K/uL  Auto Basophil # : 0.00 K/uL  Auto Neutrophil % : 65.0 %  Auto Lymphocyte % : 15.0 %  Auto Monocyte % : 12.0 %  Auto Eosinophil % : 4.0 %  Auto Basophil % : 0.0 %      Serial CBC's  10-19 @ 07:10  Hct-31.8 / Hgb-10.4 / Plat-294 / RBC-3.33 / WBC-13.49  Serial CBC's  10-18 @ 06:51  Hct-31.1 / Hgb-10.1 / Plat-261 / RBC-3.22 / WBC-15.67  Serial CBC's  10-17 @ 11:54  Hct-34.8 / Hgb-11.7 / Plat-287 / RBC-3.64 / WBC-17.84      10-19    137  |  107  |  7   ----------------------------<  91  3.7   |  24  |  0.96    Ca    8.1<L>      19 Oct 2020 07:10  Phos  2.0     10-  Mg     2.1     10-19    TPro  5.6<L>  /  Alb  1.9<L>  /  TBili  0.4  /  DBili  x   /  AST  53<H>  /  ALT  72<H>  /  AlkPhos  124<H>  10-19                      BLOOD SMEAR INTERPRETATION:       RADIOLOGY & ADDITIONAL STUDIES:    < from: CT Abdomen and Pelvis w/ IV Cont (10.17.20 @ 13:52) >  CT of the Abdomen and Pelvis was performed with intravenous contrast.  Intravenous contrast: 90 ml Omnipaque 350. 10 ml discarded.  Oral contrast: None.  Sagittal and coronal reformats were performed.    FINDINGS:  LOWER CHEST: Mild bibasilar atelectasis.    LIVER: Within normal limits.  BILE DUCTS: Normal caliber.  GALLBLADDER: Within normal limits.  SPLEEN: Within normal limits.  PANCREAS: Within normal limits.  ADRENALS: Within normal limits.  KIDNEYS/URETERS: Small right renal cysts and numerous right renal cortical hypodensities which are too small to characterize. 2 mm nonobstructing calculus in the right renal pelvis. No hydronephrosis or right hydroureter.    BLADDER: Within normal limits.  REPRODUCTIVE ORGANS: 4.0 x 8.8 cm left adnexal or cervical mass. This is indeterminant. Malignancy is not excluded. Ultrasound evaluation is recommended. Atrophic left kidney with severe hydronephrosis and hydroureter to level of a left adnexal mass. 5.6 x 3.9 x 7.0 cm left perinephric homogeneous in density with Hounsfield units of 60. This is indeterminant this may represent a perinephric hematoma, mass, or hemorrhagic cyst.    BOWEL: No bowel obstruction. Appendix is normal. Diverticulosis without diverticulitis. Diffuse colonic wall thickening throughout the entire colon is new since the previous exam consistent with some form of pancolitis.  PERITONEUM: No ascites.  VESSELS: Within normal limits.  RETROPERITONEUM/LYMPH NODES: No lymphadenopathy.  ABDOMINAL WALL: Within normal limits.  BONES: Within normal limits.    IMPRESSION:  Pancolitis new since the previous exam.    Atrophic left kidney with severe hydronephrosis and hydroureter to level of a left adnexal or cervical mass. 5.6 x 3.9 x 7.0 cm left perinephric homogeneous in density with Hounsfield units of 60. This is indeterminant this may represent a perinephric hematoma, mass, or hemorrhagic cyst. New since the previous exam.    4.0 x 8.8 cm left adnexal or cervical mass. This is indeterminant. Malignancy is not excluded. Ultrasound evaluation is recommended.      < end of copied text >

## 2020-10-19 NOTE — CONSULT NOTE ADULT - ASSESSMENT
65 year old lady with bilat hydro with stents admitted for diarrhea and pancolitis.  CT found a large cervical mass.

## 2020-10-19 NOTE — PROGRESS NOTE ADULT - PROBLEM SELECTOR PLAN 2
US pelvis shows 4.5 cm left cervical mass extending into the left parametrium  Gyn and Heme/onc consult in AM. US pelvis shows 4.5 cm left cervical mass extending into the left parametrium  Gyn and Heme/onc consult in AM.  outpatient biopsy dr. Velasquez @ [phone] [facility]  Patient receive information and prefers make own appointment for next week

## 2020-10-19 NOTE — PROGRESS NOTE ADULT - PROBLEM SELECTOR PLAN 3
CT A/P:  Atrophic left kidney with severe hydronephrosis and hydroureter.   urology onboard  f/u urine lytes  Monitor BMP  Urology consulted. CT A/P:  Atrophic left kidney with severe hydronephrosis and hydroureter.   urology onboard  recommended renal Lasix scan- nuclear medicine  f/u urine lytes  Monitor BMP  Urology consulted. CT A/P:  Atrophic left kidney with severe hydronephrosis and hydroureter.   urology onboard  recommended renal Lasix scan- nuclear medicine  elevated CR (0.96) resolved  Monitor BMP  Urology consulted.  urine cultures negative

## 2020-10-19 NOTE — PROGRESS NOTE ADULT - ASSESSMENT
65y.o. Female with L atrophic kidney, L hydro likely secondary to adnexal mass    -Known history of hydronephrosis and atrophic kidney  -Recommend renal lasix scan, inpt vs outpt  -GYN consult

## 2020-10-19 NOTE — PROGRESS NOTE ADULT - SUBJECTIVE AND OBJECTIVE BOX
Chief complaint  Patient is a 56y old  Male who presents with a chief complaint of fever, cough, emesis (17 Sep 2020 09:20)   Review of systems  Patient looks comfortable, NPO for colonoscopy, had hypoglycemic episode this AM.    Labs and Fingersticks  CAPILLARY BLOOD GLUCOSE      POCT Blood Glucose.: 85 mg/dL (17 Sep 2020 08:10)  POCT Blood Glucose.: 109 mg/dL (16 Sep 2020 23:46)  POCT Blood Glucose.: 76 mg/dL (16 Sep 2020 21:53)  POCT Blood Glucose.: 93 mg/dL (16 Sep 2020 16:59)    Medications  MEDICATIONS  (STANDING):  aspirin  chewable 81 milliGRAM(s) Oral daily  atorvastatin 80 milliGRAM(s) Oral at bedtime  cefTRIAXone   IVPB 2000 milliGRAM(s) IV Intermittent every 24 hours  chlorhexidine 4% Liquid 1 Application(s) Topical <User Schedule>  dextrose 5%. 1000 milliLiter(s) (50 mL/Hr) IV Continuous <Continuous>  dextrose 50% Injectable 12.5 Gram(s) IV Push once  dextrose 50% Injectable 25 Gram(s) IV Push once  dextrose 50% Injectable 25 Gram(s) IV Push once  furosemide    Tablet 80 milliGRAM(s) Oral daily  influenza   Vaccine 0.5 milliLiter(s) IntraMuscular once  insulin glargine Injectable (LANTUS) 60 Unit(s) SubCutaneous at bedtime  insulin lispro (HumaLOG) corrective regimen sliding scale   SubCutaneous three times a day before meals  insulin lispro (HumaLOG) corrective regimen sliding scale   SubCutaneous at bedtime  insulin lispro Injectable (HumaLOG) 20 Unit(s) SubCutaneous three times a day with meals  loratadine 10 milliGRAM(s) Oral daily  metoprolol succinate ER 50 milliGRAM(s) Oral every 12 hours  pantoprazole    Tablet 40 milliGRAM(s) Oral before breakfast  sacubitril 49 mG/valsartan 51 mG 1 Tablet(s) Oral two times a day  sodium chloride 0.9%. 500 milliLiter(s) (30 mL/Hr) IV Continuous <Continuous>  tamsulosin 0.4 milliGRAM(s) Oral at bedtime      Physical Exam  General: Patient comfortable in bed  Vital Signs Last 12 Hrs  T(F): 97.5 (09-17-20 @ 12:49), Max: 97.9 (09-17-20 @ 11:00)  HR: 82 (09-17-20 @ 12:43) (81 - 83)  BP: 108/83 (09-17-20 @ 12:49) (100/55 - 108/83)  BP(mean): --  RR: 18 (09-17-20 @ 12:49) (18 - 19)  SpO2: 97% (09-17-20 @ 12:49) (97% - 98%) INTERVAL HPI/OVERNIGHT EVENTS:  Pt resting comfortably. No acute complaints.   Voiding well.   Denies abd/back pain.    MEDICATIONS  (STANDING):  ciprofloxacin   IVPB 400 milliGRAM(s) IV Intermittent every 12 hours  heparin   Injectable 5000 Unit(s) SubCutaneous every 8 hours  metroNIDAZOLE  IVPB 500 milliGRAM(s) IV Intermittent every 8 hours  simvastatin 40 milliGRAM(s) Oral at bedtime  sodium chloride 0.9%. 1000 milliLiter(s) (80 mL/Hr) IV Continuous <Continuous>    MEDICATIONS  (PRN):  acetaminophen   Tablet .. 650 milliGRAM(s) Oral every 6 hours PRN Temp greater or equal to 38C (100.4F), Mild Pain (1 - 3)      Vital Signs Last 24 Hrs  T(C): 36.8 (19 Oct 2020 04:41), Max: 37.2 (18 Oct 2020 13:41)  T(F): 98.3 (19 Oct 2020 04:41), Max: 98.9 (18 Oct 2020 13:41)  HR: 97 (19 Oct 2020 04:41) (94 - 104)  BP: 102/68 (19 Oct 2020 04:41) (91/74 - 116/75)  BP(mean): --  RR: 18 (19 Oct 2020 04:41) (18 - 18)  SpO2: 99% (19 Oct 2020 04:41) (95% - 99%)    Physical:  General: A&Ox3. NAD.  Abdomen: Soft nondistended, no suprapubic tenderness.  Back: No CVAT b/l    I&O's Detail      LABS:                        10.4   13.49 )-----------( 294      ( 19 Oct 2020 07:10 )             31.8             10-19    137  |  107  |  7   ----------------------------<  91  3.7   |  24  |  0.96    Ca    8.1<L>      19 Oct 2020 07:10  Phos  2.0     10-19  Mg     2.1     10-19    TPro  5.6<L>  /  Alb  1.9<L>  /  TBili  0.4  /  DBili  x   /  AST  53<H>  /  ALT  72<H>  /  AlkPhos  124<H>  10-19    Urine Culture Results:   >=3 organisms. Probable collection contamination. (10.17.20 @ 19:07)         Chief complaint  Patient is a 56y old  Male who presents with a chief complaint of fever, cough, emesis (17 Sep 2020 09:20)   Review of systems  Patient looks comfortable, NPO for colonoscopy, had hypoglycemic episode this AM.    Labs and Fingersticks    CAPILLARY BLOOD GLUCOSE      POCT Blood Glucose.: 85 mg/dL (17 Sep 2020 08:10)  POCT Blood Glucose.: 109 mg/dL (16 Sep 2020 23:46)  POCT Blood Glucose.: 76 mg/dL (16 Sep 2020 21:53)  POCT Blood Glucose.: 93 mg/dL (16 Sep 2020 16:59)    Medications  MEDICATIONS  (STANDING):  aspirin  chewable 81 milliGRAM(s) Oral daily  atorvastatin 80 milliGRAM(s) Oral at bedtime  cefTRIAXone   IVPB 2000 milliGRAM(s) IV Intermittent every 24 hours  chlorhexidine 4% Liquid 1 Application(s) Topical <User Schedule>  dextrose 5%. 1000 milliLiter(s) (50 mL/Hr) IV Continuous <Continuous>  dextrose 50% Injectable 12.5 Gram(s) IV Push once  dextrose 50% Injectable 25 Gram(s) IV Push once  dextrose 50% Injectable 25 Gram(s) IV Push once  furosemide    Tablet 80 milliGRAM(s) Oral daily  influenza   Vaccine 0.5 milliLiter(s) IntraMuscular once  insulin glargine Injectable (LANTUS) 60 Unit(s) SubCutaneous at bedtime  insulin lispro (HumaLOG) corrective regimen sliding scale   SubCutaneous three times a day before meals  insulin lispro (HumaLOG) corrective regimen sliding scale   SubCutaneous at bedtime  insulin lispro Injectable (HumaLOG) 20 Unit(s) SubCutaneous three times a day with meals  loratadine 10 milliGRAM(s) Oral daily  metoprolol succinate ER 50 milliGRAM(s) Oral every 12 hours  pantoprazole    Tablet 40 milliGRAM(s) Oral before breakfast  sacubitril 49 mG/valsartan 51 mG 1 Tablet(s) Oral two times a day  sodium chloride 0.9%. 500 milliLiter(s) (30 mL/Hr) IV Continuous <Continuous>  tamsulosin 0.4 milliGRAM(s) Oral at bedtime      Physical Exam  General: Patient comfortable in bed  Vital Signs Last 12 Hrs  T(F): 97.5 (09-17-20 @ 12:49), Max: 97.9 (09-17-20 @ 11:00)  HR: 82 (09-17-20 @ 12:43) (81 - 83)  BP: 108/83 (09-17-20 @ 12:49) (100/55 - 108/83)  BP(mean): --  RR: 18 (09-17-20 @ 12:49) (18 - 19)  SpO2: 97% (09-17-20 @ 12:49) (97% - 98%)

## 2020-10-19 NOTE — CHART NOTE - NSCHARTNOTEFT_GEN_A_CORE
EVENT:      65 female with PMH of HTN, HLD, renal stones s/p bilateral ureteral stents presents to ED with epigastric pain and diarrhea X 7 days. Pt complains of   sharp epigastric pain, gets worse with eating, no relieving factors and is associated with diarrhea and nausea. Pt reports diarrhea for last 7 days, has 4-5 episodes of loose to semiformed stools per day. Pt denies any fever, vomiting, pain, burning with urination, increased urinary frequency, blood in urine, chest pain, shortness of breath, cough or any other acute complaints.   10/18/20, 21:46pm, patient with colitis c/o abdominal gas and requested Simethicone.   OBJECTIVE:  Vital Signs Last 24 Hrs  T(C): 36.6 (18 Oct 2020 21:56), Max: 38 (18 Oct 2020 03:27)  T(F): 97.8 (18 Oct 2020 21:56), Max: 100.4 (18 Oct 2020 03:27)  HR: 94 (18 Oct 2020 21:56) (94 - 104)  BP: 116/75 (18 Oct 2020 21:56) (91/74 - 116/75)  BP(mean): --  RR: 18 (18 Oct 2020 21:56) (18 - 18)  SpO2: 98% (18 Oct 2020 21:56) (95% - 98%)    FOCUSED PHYSICAL EXAM:    LABS:                        10.1   15.67 )-----------( 261      ( 18 Oct 2020 06:51 )             31.1     10-18    138  |  108  |  11  ----------------------------<  107<H>  3.8   |  24  |  1.09    Ca    7.8<L>      18 Oct 2020 06:51  Phos  2.7     10-18  Mg     1.7     10-18    TPro  5.6<L>  /  Alb  2.0<L>  /  TBili  0.7  /  DBili  x   /  AST  39  /  ALT  47  /  AlkPhos  113  10-18      PLAN: 1. Simethicone 80 mg chew po x dose for c/o gas.     FOLLOW UP / RESULT: Reevaluate patient comfort level.

## 2020-10-19 NOTE — PROGRESS NOTE ADULT - ATTENDING COMMENTS
HPI:  65 F with PMH of HTN, HLD, renal stones s/p bilateral ureteral stents presents to ED with epigastric pain and diarrhea X 7 days. Pt complains of   sharp epigastric pain, gets worse with eating, no relieving factors and is associated with diarrhea and nausea. Pt reports diarrhea for last 7 days, has 4-5 episodes of loose to semiformed stools per day. Pt denies any fever, vomiting, pain, burning with urination, increased urinary frequency, blood in urine, chest pain, shortness of breath, cough or any other acute complaints.     In ED, /69, , Temp 98.1 (17 Oct 2020 15:11)    # GYN TO EVALUATE FOR CERVICAL  Bx TO EVALUATE CERVICAL MASS; RECOMMENDING OUTPATIENT BIOPSY HOWEVER ONCOLOGY CONSULT IN PROGRESS - RECOMMENDING BIOPSY  # ABDOMINAL COLIC, PANCOLITIS - ON CIPROFLOXACIN AND FLAGYL  # SUSPECT CERVICAL CANCER, LEFT ATROPHIC KIDNEY WITH LEFT HYDRONEPHROSIS AND LEFT HYDROURETER. OBTAIN GYN CONSULT AND TRANS VAGINAL U/S ( REVIEWED ) ,  MARKER WNL  - UROLOGY CONSULT RECOMMENDING RENAL LASIX SCAN  # GI AND DVT PROPHYLAXIS    LEN HERNANDEZ M.D. COVERING FOR ARLETH HERNANDEZ M.D.

## 2020-10-19 NOTE — PROGRESS NOTE ADULT - SUBJECTIVE AND OBJECTIVE BOX
NP Note   Patient is a 65y old  Female who presents with a chief complaint of Abdominal pain and diarrhea (18 Oct 2020 09:38)    HPI:  65 F with PMH of HTN, HLD, renal stones s/p bilateral ureteral stents presents to ED with epigastric pain and diarrhea X 7 days. Pt complains of   sharp epigastric pain, gets worse with eating, no relieving factors and is associated with diarrhea and nausea. Pt reports diarrhea for last 7 days, has 4-5 episodes of loose to semiformed stools per day.   Patient admitted to medicine for abdominal pain found to have pancolitis on abdominal CT. Patient treated with antibiotics. Patient found to have atrophic left kidney with severe hydronephrosis and left adnexal/cervical mass. Urology dr. Batista and GYN [facility] consulted.      Patient seen and examined at bedside.            INTERVAL HPI/OVERNIGHT EVENTS: no new complaints    MEDICATIONS  (STANDING):  ciprofloxacin   IVPB 400 milliGRAM(s) IV Intermittent every 12 hours  heparin   Injectable 5000 Unit(s) SubCutaneous every 8 hours  metroNIDAZOLE  IVPB 500 milliGRAM(s) IV Intermittent every 8 hours  simvastatin 40 milliGRAM(s) Oral at bedtime  sodium chloride 0.9%. 1000 milliLiter(s) (80 mL/Hr) IV Continuous <Continuous>    MEDICATIONS  (PRN):  acetaminophen   Tablet .. 650 milliGRAM(s) Oral every 6 hours PRN Temp greater or equal to 38C (100.4F), Mild Pain (1 - 3)      __________________________________________________  REVIEW OF SYSTEMS:    CONSTITUTIONAL: No fever,   EYES: no acute visual disturbances  NECK: No pain or stiffness  RESPIRATORY: No cough; No shortness of breath  CARDIOVASCULAR: No chest pain, no palpitations  GASTROINTESTINAL: No pain. No nausea or vomiting; No diarrhea   NEUROLOGICAL: No headache or numbness, no tremors  MUSCULOSKELETAL: No joint pain, no muscle pain  GENITOURINARY: no dysuria, no frequency, no hesitancy  PSYCHIATRY: no depression , no anxiety  ALL OTHER  ROS negative        Vital Signs Last 24 Hrs  T(C): 36.8 (19 Oct 2020 04:41), Max: 37.2 (18 Oct 2020 13:41)  T(F): 98.3 (19 Oct 2020 04:41), Max: 98.9 (18 Oct 2020 13:41)  HR: 97 (19 Oct 2020 04:41) (94 - 104)  BP: 102/68 (19 Oct 2020 04:41) (91/74 - 116/75)  BP(mean): --  RR: 18 (19 Oct 2020 04:41) (18 - 18)  SpO2: 99% (19 Oct 2020 04:41) (95% - 99%)    ________________________________________________  PHYSICAL EXAM:  GENERAL: NAD  HEENT: Normocephalic;  conjunctivae and sclerae clear; moist mucous membranes;   NECK : supple  CHEST/LUNG: Clear to auscultation bilaterally with good air entry   HEART: S1 S2  regular; no murmurs, gallops or rubs  ABDOMEN: Soft, Nontender, Nondistended; Bowel sounds present  EXTREMITIES: no cyanosis; no edema; no calf tenderness  SKIN: warm and dry; no rash  NERVOUS SYSTEM:  Awake and alert; Oriented  to place, person and time ; no new deficits    _________________________________________________  LABS:                        10.4   13.49 )-----------( 294      ( 19 Oct 2020 07:10 )             31.8     10-19    137  |  107  |  7   ----------------------------<  91  3.7   |  24  |  0.96    Ca    8.1<L>      19 Oct 2020 07:10  Phos  2.0     10-19  Mg     2.1     10-19    TPro  5.6<L>  /  Alb  1.9<L>  /  TBili  0.4  /  DBili  x   /  AST  53<H>  /  ALT  72<H>  /  AlkPhos  124<H>  10-19        CAPILLARY BLOOD GLUCOSE            RADIOLOGY & ADDITIONAL TESTS:    EXAM:  CT ABDOMEN AND PELVIS IC                            PROCEDURE DATE:  10/17/2020          INTERPRETATION:  CLINICAL INFORMATION: Kidney stone removal    COMPARISON: CT abdomen and pelvis 8/25/2020    PROCEDURE:  CT of the Abdomen and Pelvis was performed with intravenous contrast.  Intravenous contrast: 90 ml Omnipaque 350. 10 ml discarded.  Oral contrast: None.  Sagittal and coronal reformats were performed.    FINDINGS:  LOWER CHEST: Mild bibasilar atelectasis.    LIVER: Within normal limits.  BILE DUCTS: Normal caliber.  GALLBLADDER: Within normal limits.  SPLEEN: Within normal limits.  PANCREAS: Within normal limits.  ADRENALS: Within normal limits.  KIDNEYS/URETERS: Small right renal cysts and numerous right renal cortical hypodensities which are too small to characterize. 2 mm nonobstructing calculus in the right renal pelvis. No hydronephrosis or right hydroureter.    BLADDER: Within normal limits.  REPRODUCTIVE ORGANS: 4.0 x 8.8 cm left adnexal or cervical mass. This is indeterminant. Malignancy is not excluded. Ultrasound evaluation is recommended. Atrophic left kidney with severe hydronephrosis and hydroureter to level of a left adnexal mass. 5.6 x 3.9 x 7.0 cm left perinephric homogeneous in density with Hounsfield units of 60. This is indeterminant this may represent a perinephric hematoma, mass, or hemorrhagic cyst.    BOWEL: No bowel obstruction. Appendix is normal. Diverticulosis without diverticulitis. Diffuse colonic wall thickening throughout the entire colon is new since the previous exam consistent with some form of pancolitis.  PERITONEUM: No ascites.  VESSELS: Within normal limits.  RETROPERITONEUM/LYMPH NODES: No lymphadenopathy.  ABDOMINAL WALL: Within normal limits.  BONES: Within normal limits.    IMPRESSION:  Pancolitis new since the previous exam.    Atrophic left kidney with severe hydronephrosis and hydroureter to level of a left adnexal or cervical mass. 5.6 x 3.9 x 7.0 cm left perinephric homogeneous in density with Hounsfield units of 60. This is indeterminant this may represent a perinephric hematoma, mass, or hemorrhagic cyst. New since the previous exam.    4.0 x 8.8 cm left adnexal or cervical mass. This is indeterminant. Malignancy is not excluded. Ultrasound evaluation is recommended.        JOLANTA MACIAS M.D., ATTENDING RADIOLOGIST  This document has been electronically signed. Oct 17 2020  2:13PM      EXAM:  US ABDOMEN COMPLETE                            PROCEDURE DATE:  10/17/2020          INTERPRETATION:  US ABDOMEN COMPLETE    HISTORY:  Hydronephrosis    COMPARISON: Same-day CT    Multiple transverse and longitudinal sonographic images of theabdomen were obtained.    LIVER: 14 cm in length. The liver is diffusely increased in echogenicity, consistent with fatty infiltration. No focal lesions are identified. Ultrasound sensitivity is somewhat limited in this setting. Main portal vein is patent with normal flow direction.    SPLEEN:  7.5 cm in length.    GALLBLADDER: No calculi, wall thickening, or pericholecystic fluid.  Hassan sign is absent.    BILE DUCTS: The common bile duct measures 3 mm.  No intrahepatic ductal dilatation.    PANCREAS:  The head and proximal body are normal. The distal body and tail are obscured.    RIGHT KIDNEY: 12 cm in length.  No hydronephrosis or shadowing stone.    LEFT KIDNEY:  9.2 cm in length. Severe hydronephrosis. No shadowing stone. Subcapsular hematoma again noted.    AORTA AND INFERIOR VENA CAVA: Normal in caliber.    FLUID: No ascites.    IMPRESSION:    Atrophic left kidney with severe hydronephrosis and subcapsular hematoma as on CT. Findings suggest chronicity.    Fatty liver        ZOHRA ACOSTA M.D., ATTENDING RADIOLOGIST  This document has been electronically signed. Oct 17 2020  6:37PM    Imaging  Reviewed:  YES    Consultant(s) Notes Reviewed:   YES      Plan of care was discussed with patient and /or primary care giver; all questions and concerns were addressed NP Note   Patient is a 65y old  Female who presents with a chief complaint of Abdominal pain and diarrhea (18 Oct 2020 09:38)    HPI:  65 F with PMH of HTN, HLD, renal stones s/p bilateral ureteral stents presents to ED with epigastric pain and diarrhea X 7 days. Patient admitted to medicine for abdominal pain found to have pancolitis on abdominal CT. Patient treated with antibiotics. Patient found to have atrophic left kidney with severe hydronephrosis and left adnexal/cervical mass. Urology and gyn consulted.      Patient seen and examined at bedside.  Pt complains of RUQ epigastric pain .  Deneis N/V D.              INTERVAL HPI/OVERNIGHT EVENTS: no new complaints    MEDICATIONS  (STANDING):  ciprofloxacin   IVPB 400 milliGRAM(s) IV Intermittent every 12 hours  heparin   Injectable 5000 Unit(s) SubCutaneous every 8 hours  metroNIDAZOLE  IVPB 500 milliGRAM(s) IV Intermittent every 8 hours  simvastatin 40 milliGRAM(s) Oral at bedtime  sodium chloride 0.9%. 1000 milliLiter(s) (80 mL/Hr) IV Continuous <Continuous>    MEDICATIONS  (PRN):  acetaminophen   Tablet .. 650 milliGRAM(s) Oral every 6 hours PRN Temp greater or equal to 38C (100.4F), Mild Pain (1 - 3)      __________________________________________________  REVIEW OF SYSTEMS:    CONSTITUTIONAL: No fever,   EYES: no acute visual disturbances  NECK: No pain or stiffness  RESPIRATORY: No cough; No shortness of breath  CARDIOVASCULAR: No chest pain, no palpitations  GASTROINTESTINAL: No pain. No nausea or vomiting; No diarrhea   NEUROLOGICAL: No headache or numbness, no tremors  MUSCULOSKELETAL: No joint pain, no muscle pain  GENITOURINARY: no dysuria, no frequency, no hesitancy  PSYCHIATRY: no depression , no anxiety  ALL OTHER  ROS negative        Vital Signs Last 24 Hrs  T(C): 36.8 (19 Oct 2020 04:41), Max: 37.2 (18 Oct 2020 13:41)  T(F): 98.3 (19 Oct 2020 04:41), Max: 98.9 (18 Oct 2020 13:41)  HR: 97 (19 Oct 2020 04:41) (94 - 104)  BP: 102/68 (19 Oct 2020 04:41) (91/74 - 116/75)  BP(mean): --  RR: 18 (19 Oct 2020 04:41) (18 - 18)  SpO2: 99% (19 Oct 2020 04:41) (95% - 99%)    ________________________________________________  PHYSICAL EXAM:  GENERAL: NAD  HEENT: Normocephalic;  conjunctivae and sclerae clear; moist mucous membranes;   NECK : supple  CHEST/LUNG: Clear to auscultation bilaterally with good air entry   HEART: S1 S2  regular; no murmurs, gallops or rubs  ABDOMEN: Soft, Nontender, Nondistended; Bowel sounds present  EXTREMITIES: no cyanosis; no edema; no calf tenderness  SKIN: warm and dry; no rash  NERVOUS SYSTEM:  Awake and alert; Oriented  to place, person and time ; no new deficits    _________________________________________________  LABS:                        10.4   13.49 )-----------( 294      ( 19 Oct 2020 07:10 )             31.8     10-19    137  |  107  |  7   ----------------------------<  91  3.7   |  24  |  0.96    Ca    8.1<L>      19 Oct 2020 07:10  Phos  2.0     10-19  Mg     2.1     10-19    TPro  5.6<L>  /  Alb  1.9<L>  /  TBili  0.4  /  DBili  x   /  AST  53<H>  /  ALT  72<H>  /  AlkPhos  124<H>  10-19        CAPILLARY BLOOD GLUCOSE            RADIOLOGY & ADDITIONAL TESTS:    EXAM:  CT ABDOMEN AND PELVIS IC                            PROCEDURE DATE:  10/17/2020          INTERPRETATION:  CLINICAL INFORMATION: Kidney stone removal    COMPARISON: CT abdomen and pelvis 8/25/2020    PROCEDURE:  CT of the Abdomen and Pelvis was performed with intravenous contrast.  Intravenous contrast: 90 ml Omnipaque 350. 10 ml discarded.  Oral contrast: None.  Sagittal and coronal reformats were performed.    FINDINGS:  LOWER CHEST: Mild bibasilar atelectasis.    LIVER: Within normal limits.  BILE DUCTS: Normal caliber.  GALLBLADDER: Within normal limits.  SPLEEN: Within normal limits.  PANCREAS: Within normal limits.  ADRENALS: Within normal limits.  KIDNEYS/URETERS: Small right renal cysts and numerous right renal cortical hypodensities which are too small to characterize. 2 mm nonobstructing calculus in the right renal pelvis. No hydronephrosis or right hydroureter.    BLADDER: Within normal limits.  REPRODUCTIVE ORGANS: 4.0 x 8.8 cm left adnexal or cervical mass. This is indeterminant. Malignancy is not excluded. Ultrasound evaluation is recommended. Atrophic left kidney with severe hydronephrosis and hydroureter to level of a left adnexal mass. 5.6 x 3.9 x 7.0 cm left perinephric homogeneous in density with Hounsfield units of 60. This is indeterminant this may represent a perinephric hematoma, mass, or hemorrhagic cyst.    BOWEL: No bowel obstruction. Appendix is normal. Diverticulosis without diverticulitis. Diffuse colonic wall thickening throughout the entire colon is new since the previous exam consistent with some form of pancolitis.  PERITONEUM: No ascites.  VESSELS: Within normal limits.  RETROPERITONEUM/LYMPH NODES: No lymphadenopathy.  ABDOMINAL WALL: Within normal limits.  BONES: Within normal limits.    IMPRESSION:  Pancolitis new since the previous exam.    Atrophic left kidney with severe hydronephrosis and hydroureter to level of a left adnexal or cervical mass. 5.6 x 3.9 x 7.0 cm left perinephric homogeneous in density with Hounsfield units of 60. This is indeterminant this may represent a perinephric hematoma, mass, or hemorrhagic cyst. New since the previous exam.    4.0 x 8.8 cm left adnexal or cervical mass. This is indeterminant. Malignancy is not excluded. Ultrasound evaluation is recommended.        JOLANTA MACIAS M.D., ATTENDING RADIOLOGIST  This document has been electronically signed. Oct 17 2020  2:13PM      EXAM:  US ABDOMEN COMPLETE                            PROCEDURE DATE:  10/17/2020          INTERPRETATION:  US ABDOMEN COMPLETE    HISTORY:  Hydronephrosis    COMPARISON: Same-day CT    Multiple transverse and longitudinal sonographic images of theabdomen were obtained.    LIVER: 14 cm in length. The liver is diffusely increased in echogenicity, consistent with fatty infiltration. No focal lesions are identified. Ultrasound sensitivity is somewhat limited in this setting. Main portal vein is patent with normal flow direction.    SPLEEN:  7.5 cm in length.    GALLBLADDER: No calculi, wall thickening, or pericholecystic fluid.  Hassan sign is absent.    BILE DUCTS: The common bile duct measures 3 mm.  No intrahepatic ductal dilatation.    PANCREAS:  The head and proximal body are normal. The distal body and tail are obscured.    RIGHT KIDNEY: 12 cm in length.  No hydronephrosis or shadowing stone.    LEFT KIDNEY:  9.2 cm in length. Severe hydronephrosis. No shadowing stone. Subcapsular hematoma again noted.    AORTA AND INFERIOR VENA CAVA: Normal in caliber.    FLUID: No ascites.    IMPRESSION:    Atrophic left kidney with severe hydronephrosis and subcapsular hematoma as on CT. Findings suggest chronicity.    Fatty liver        ZOHRA ACOSTA M.D., ATTENDING RADIOLOGIST  This document has been electronically signed. Oct 17 2020  6:37PM    Imaging  Reviewed:  YES    Consultant(s) Notes Reviewed:   YES      Plan of care was discussed with patient and /or primary care giver; all questions and concerns were addressed NP Note   Patient is a 65y old  Female who presents with a chief complaint of Abdominal pain and diarrhea (18 Oct 2020 09:38)    HPI:  65 F with PMH of HTN, HLD, renal stones s/p bilateral ureteral stents presents to ED with epigastric pain and diarrhea X 7 days. In ED patient found to have leucocytosis and pancolitis on CT started treatment with antibiotics. Patient was admitted to medicine for pancolitis. Patient found to have atrophic left kidney with severe hydronephrosis and left adnexal/cervical mass not seen on previous exam. Urology and gyn consulted for further assistance.        Patient seen and examined at bedside.  Pt complains of RUQ epigastric pain .  Denies N/V D. Leucocytosis remains elevated              INTERVAL HPI/OVERNIGHT EVENTS: no new complaints    MEDICATIONS  (STANDING):  ciprofloxacin   IVPB 400 milliGRAM(s) IV Intermittent every 12 hours  heparin   Injectable 5000 Unit(s) SubCutaneous every 8 hours  metroNIDAZOLE  IVPB 500 milliGRAM(s) IV Intermittent every 8 hours  simvastatin 40 milliGRAM(s) Oral at bedtime  sodium chloride 0.9%. 1000 milliLiter(s) (80 mL/Hr) IV Continuous <Continuous>    MEDICATIONS  (PRN):  acetaminophen   Tablet .. 650 milliGRAM(s) Oral every 6 hours PRN Temp greater or equal to 38C (100.4F), Mild Pain (1 - 3)      __________________________________________________  REVIEW OF SYSTEMS:    CONSTITUTIONAL: No fever,   EYES: no acute visual disturbances  NECK: No pain or stiffness  RESPIRATORY: No cough; No shortness of breath  CARDIOVASCULAR: No chest pain, no palpitations  GASTROINTESTINAL: No pain. No nausea or vomiting; No diarrhea   NEUROLOGICAL: No headache or numbness, no tremors  MUSCULOSKELETAL: No joint pain, no muscle pain  GENITOURINARY: no dysuria, no frequency, no hesitancy  PSYCHIATRY: no depression , no anxiety  ALL OTHER  ROS negative        Vital Signs Last 24 Hrs  T(C): 36.8 (19 Oct 2020 04:41), Max: 37.2 (18 Oct 2020 13:41)  T(F): 98.3 (19 Oct 2020 04:41), Max: 98.9 (18 Oct 2020 13:41)  HR: 97 (19 Oct 2020 04:41) (94 - 104)  BP: 102/68 (19 Oct 2020 04:41) (91/74 - 116/75)  BP(mean): --  RR: 18 (19 Oct 2020 04:41) (18 - 18)  SpO2: 99% (19 Oct 2020 04:41) (95% - 99%)    ________________________________________________  PHYSICAL EXAM:  GENERAL: NAD  HEENT: Normocephalic;  conjunctivae and sclerae clear; moist mucous membranes;   NECK : supple  CHEST/LUNG: Clear to auscultation bilaterally with good air entry   HEART: S1 S2  regular; no murmurs, gallops or rubs  ABDOMEN: Soft, Nontender, Nondistended; Bowel sounds present  EXTREMITIES: no cyanosis; no edema; no calf tenderness  SKIN: warm and dry; no rash  NERVOUS SYSTEM:  Awake and alert; Oriented  to place, person and time ; no new deficits    _________________________________________________  LABS:                        10.4   13.49 )-----------( 294      ( 19 Oct 2020 07:10 )             31.8     10-19    137  |  107  |  7   ----------------------------<  91  3.7   |  24  |  0.96    Ca    8.1<L>      19 Oct 2020 07:10  Phos  2.0     10-19  Mg     2.1     10-19    TPro  5.6<L>  /  Alb  1.9<L>  /  TBili  0.4  /  DBili  x   /  AST  53<H>  /  ALT  72<H>  /  AlkPhos  124<H>  10-19        CAPILLARY BLOOD GLUCOSE      RADIOLOGY & ADDITIONAL TESTS:    EXAM:  CT ABDOMEN AND PELVIS IC                            PROCEDURE DATE:  10/17/2020          INTERPRETATION:  CLINICAL INFORMATION: Kidney stone removal    COMPARISON: CT abdomen and pelvis 8/25/2020    PROCEDURE:  CT of the Abdomen and Pelvis was performed with intravenous contrast.  Intravenous contrast: 90 ml Omnipaque 350. 10 ml discarded.  Oral contrast: None.  Sagittal and coronal reformats were performed.    FINDINGS:  LOWER CHEST: Mild bibasilar atelectasis.    LIVER: Within normal limits.  BILE DUCTS: Normal caliber.  GALLBLADDER: Within normal limits.  SPLEEN: Within normal limits.  PANCREAS: Within normal limits.  ADRENALS: Within normal limits.  KIDNEYS/URETERS: Small right renal cysts and numerous right renal cortical hypodensities which are too small to characterize. 2 mm nonobstructing calculus in the right renal pelvis. No hydronephrosis or right hydroureter.    BLADDER: Within normal limits.  REPRODUCTIVE ORGANS: 4.0 x 8.8 cm left adnexal or cervical mass. This is indeterminant. Malignancy is not excluded. Ultrasound evaluation is recommended. Atrophic left kidney with severe hydronephrosis and hydroureter to level of a left adnexal mass. 5.6 x 3.9 x 7.0 cm left perinephric homogeneous in density with Hounsfield units of 60. This is indeterminant this may represent a perinephric hematoma, mass, or hemorrhagic cyst.    BOWEL: No bowel obstruction. Appendix is normal. Diverticulosis without diverticulitis. Diffuse colonic wall thickening throughout the entire colon is new since the previous exam consistent with some form of pancolitis.  PERITONEUM: No ascites.  VESSELS: Within normal limits.  RETROPERITONEUM/LYMPH NODES: No lymphadenopathy.  ABDOMINAL WALL: Within normal limits.  BONES: Within normal limits.    IMPRESSION:  Pancolitis new since the previous exam.    Atrophic left kidney with severe hydronephrosis and hydroureter to level of a left adnexal or cervical mass. 5.6 x 3.9 x 7.0 cm left perinephric homogeneous in density with Hounsfield units of 60. This is indeterminant this may represent a perinephric hematoma, mass, or hemorrhagic cyst. New since the previous exam.    4.0 x 8.8 cm left adnexal or cervical mass. This is indeterminant. Malignancy is not excluded. Ultrasound evaluation is recommended.        JOLANTA MACAIS M.D., ATTENDING RADIOLOGIST  This document has been electronically signed. Oct 17 2020  2:13PM      EXAM:  US ABDOMEN COMPLETE                            PROCEDURE DATE:  10/17/2020          INTERPRETATION:  US ABDOMEN COMPLETE    HISTORY:  Hydronephrosis    COMPARISON: Same-day CT    Multiple transverse and longitudinal sonographic images of theabdomen were obtained.    LIVER: 14 cm in length. The liver is diffusely increased in echogenicity, consistent with fatty infiltration. No focal lesions are identified. Ultrasound sensitivity is somewhat limited in this setting. Main portal vein is patent with normal flow direction.    SPLEEN:  7.5 cm in length.    GALLBLADDER: No calculi, wall thickening, or pericholecystic fluid.  Hassan sign is absent.    BILE DUCTS: The common bile duct measures 3 mm.  No intrahepatic ductal dilatation.    PANCREAS:  The head and proximal body are normal. The distal body and tail are obscured.    RIGHT KIDNEY: 12 cm in length.  No hydronephrosis or shadowing stone.    LEFT KIDNEY:  9.2 cm in length. Severe hydronephrosis. No shadowing stone. Subcapsular hematoma again noted.    AORTA AND INFERIOR VENA CAVA: Normal in caliber.    FLUID: No ascites.    IMPRESSION:    Atrophic left kidney with severe hydronephrosis and subcapsular hematoma as on CT. Findings suggest chronicity.    Fatty liver        ZOHRA ACOSTA M.D., ATTENDING RADIOLOGIST  This document has been electronically signed. Oct 17 2020  6:37PM    Imaging  Reviewed:  YES    Consultant(s) Notes Reviewed:   YES      Plan of care was discussed with patient and /or primary care giver; all questions and concerns were addressed NP Note   Patient is a 65y old  Female who presents with a chief complaint of Abdominal pain and diarrhea (18 Oct 2020 09:38)    HPI:  65 F with PMH of HTN, HLD, renal stones s/p bilateral ureteral stents presents to ED with epigastric pain and diarrhea X 7 days. In ED patient found to have leucocytosis and pancolitis on CT started treatment with antibiotics. Patient was admitted to medicine for pancolitis. Patient found to have atrophic left kidney with severe hydronephrosis and left adnexal/cervical mass not seen on previous exam. Urology and gyn consulted for further assistance.        Patient seen and examined at bedside.  Pt complains of RUQ epigastric pain .  Denies N/V D. Leucocytosis remains elevated. Patient evaluated by GYN with recommendation for biopsy as an outpatient. Patient received phone # for dr. Velasquez and will follow up next week. Nephrology recommend renal Lasix scan in nuclear medicine, will follow up tomorrow.              INTERVAL HPI/OVERNIGHT EVENTS: no new complaints    MEDICATIONS  (STANDING):  ciprofloxacin   IVPB 400 milliGRAM(s) IV Intermittent every 12 hours  heparin   Injectable 5000 Unit(s) SubCutaneous every 8 hours  metroNIDAZOLE  IVPB 500 milliGRAM(s) IV Intermittent every 8 hours  simvastatin 40 milliGRAM(s) Oral at bedtime  sodium chloride 0.9%. 1000 milliLiter(s) (80 mL/Hr) IV Continuous <Continuous>    MEDICATIONS  (PRN):  acetaminophen   Tablet .. 650 milliGRAM(s) Oral every 6 hours PRN Temp greater or equal to 38C (100.4F), Mild Pain (1 - 3)      __________________________________________________  REVIEW OF SYSTEMS:    CONSTITUTIONAL: No fever,   EYES: no acute visual disturbances  NECK: No pain or stiffness  RESPIRATORY: No cough; No shortness of breath  CARDIOVASCULAR: No chest pain, no palpitations  GASTROINTESTINAL: No pain. No nausea or vomiting; No diarrhea   NEUROLOGICAL: No headache or numbness, no tremors  MUSCULOSKELETAL: No joint pain, no muscle pain  GENITOURINARY: no dysuria, no frequency, no hesitancy  PSYCHIATRY: no depression , no anxiety  ALL OTHER  ROS negative        Vital Signs Last 24 Hrs  T(C): 36.8 (19 Oct 2020 04:41), Max: 37.2 (18 Oct 2020 13:41)  T(F): 98.3 (19 Oct 2020 04:41), Max: 98.9 (18 Oct 2020 13:41)  HR: 97 (19 Oct 2020 04:41) (94 - 104)  BP: 102/68 (19 Oct 2020 04:41) (91/74 - 116/75)  BP(mean): --  RR: 18 (19 Oct 2020 04:41) (18 - 18)  SpO2: 99% (19 Oct 2020 04:41) (95% - 99%)    ________________________________________________  PHYSICAL EXAM:  GENERAL: NAD  HEENT: Normocephalic;  conjunctivae and sclerae clear; moist mucous membranes;   NECK : supple  CHEST/LUNG: Clear to auscultation bilaterally with good air entry   HEART: S1 S2  regular; no murmurs, gallops or rubs  ABDOMEN: Soft, Nontender, Nondistended; Bowel sounds present  EXTREMITIES: no cyanosis; no edema; no calf tenderness  SKIN: warm and dry; no rash  NERVOUS SYSTEM:  Awake and alert; Oriented  to place, person and time ; no new deficits    _________________________________________________  LABS:                        10.4   13.49 )-----------( 294      ( 19 Oct 2020 07:10 )             31.8     10-19    137  |  107  |  7   ----------------------------<  91  3.7   |  24  |  0.96    Ca    8.1<L>      19 Oct 2020 07:10  Phos  2.0     10-19  Mg     2.1     10-19    TPro  5.6<L>  /  Alb  1.9<L>  /  TBili  0.4  /  DBili  x   /  AST  53<H>  /  ALT  72<H>  /  AlkPhos  124<H>  10-19        CAPILLARY BLOOD GLUCOSE      RADIOLOGY & ADDITIONAL TESTS:    EXAM:  CT ABDOMEN AND PELVIS IC                            PROCEDURE DATE:  10/17/2020          INTERPRETATION:  CLINICAL INFORMATION: Kidney stone removal    COMPARISON: CT abdomen and pelvis 8/25/2020    PROCEDURE:  CT of the Abdomen and Pelvis was performed with intravenous contrast.  Intravenous contrast: 90 ml Omnipaque 350. 10 ml discarded.  Oral contrast: None.  Sagittal and coronal reformats were performed.    FINDINGS:  LOWER CHEST: Mild bibasilar atelectasis.    LIVER: Within normal limits.  BILE DUCTS: Normal caliber.  GALLBLADDER: Within normal limits.  SPLEEN: Within normal limits.  PANCREAS: Within normal limits.  ADRENALS: Within normal limits.  KIDNEYS/URETERS: Small right renal cysts and numerous right renal cortical hypodensities which are too small to characterize. 2 mm nonobstructing calculus in the right renal pelvis. No hydronephrosis or right hydroureter.    BLADDER: Within normal limits.  REPRODUCTIVE ORGANS: 4.0 x 8.8 cm left adnexal or cervical mass. This is indeterminant. Malignancy is not excluded. Ultrasound evaluation is recommended. Atrophic left kidney with severe hydronephrosis and hydroureter to level of a left adnexal mass. 5.6 x 3.9 x 7.0 cm left perinephric homogeneous in density with Hounsfield units of 60. This is indeterminant this may represent a perinephric hematoma, mass, or hemorrhagic cyst.    BOWEL: No bowel obstruction. Appendix is normal. Diverticulosis without diverticulitis. Diffuse colonic wall thickening throughout the entire colon is new since the previous exam consistent with some form of pancolitis.  PERITONEUM: No ascites.  VESSELS: Within normal limits.  RETROPERITONEUM/LYMPH NODES: No lymphadenopathy.  ABDOMINAL WALL: Within normal limits.  BONES: Within normal limits.    IMPRESSION:  Pancolitis new since the previous exam.    Atrophic left kidney with severe hydronephrosis and hydroureter to level of a left adnexal or cervical mass. 5.6 x 3.9 x 7.0 cm left perinephric homogeneous in density with Hounsfield units of 60. This is indeterminant this may represent a perinephric hematoma, mass, or hemorrhagic cyst. New since the previous exam.    4.0 x 8.8 cm left adnexal or cervical mass. This is indeterminant. Malignancy is not excluded. Ultrasound evaluation is recommended.        JOLANTA MACIAS M.D., ATTENDING RADIOLOGIST  This document has been electronically signed. Oct 17 2020  2:13PM      EXAM:  US ABDOMEN COMPLETE                            PROCEDURE DATE:  10/17/2020          INTERPRETATION:  US ABDOMEN COMPLETE    HISTORY:  Hydronephrosis    COMPARISON: Same-day CT    Multiple transverse and longitudinal sonographic images of theabdomen were obtained.    LIVER: 14 cm in length. The liver is diffusely increased in echogenicity, consistent with fatty infiltration. No focal lesions are identified. Ultrasound sensitivity is somewhat limited in this setting. Main portal vein is patent with normal flow direction.    SPLEEN:  7.5 cm in length.    GALLBLADDER: No calculi, wall thickening, or pericholecystic fluid.  Hassan sign is absent.    BILE DUCTS: The common bile duct measures 3 mm.  No intrahepatic ductal dilatation.    PANCREAS:  The head and proximal body are normal. The distal body and tail are obscured.    RIGHT KIDNEY: 12 cm in length.  No hydronephrosis or shadowing stone.    LEFT KIDNEY:  9.2 cm in length. Severe hydronephrosis. No shadowing stone. Subcapsular hematoma again noted.    AORTA AND INFERIOR VENA CAVA: Normal in caliber.    FLUID: No ascites.    IMPRESSION:    Atrophic left kidney with severe hydronephrosis and subcapsular hematoma as on CT. Findings suggest chronicity.    Fatty liver        ZOHRA ACOSTA M.D., ATTENDING RADIOLOGIST  This document has been electronically signed. Oct 17 2020  6:37PM    Imaging  Reviewed:  YES    Consultant(s) Notes Reviewed:   YES      Plan of care was discussed with patient and /or primary care giver; all questions and concerns were addressed NP Note   Patient is a 65y old  Female who presents with a chief complaint of Abdominal pain and diarrhea (18 Oct 2020 09:38)    HPI:  65 F with PMH of HTN, HLD, renal stones s/p bilateral ureteral stents presents to ED with epigastric pain and diarrhea X 7 days. In ED patient found to have leucocytosis and pancolitis on CT started treatment with antibiotics. Patient was admitted to medicine for pancolitis. Patient found to have atrophic left kidney with severe hydronephrosis and left adnexal/cervical mass not seen on previous exam. Urology and gyn consulted for further assistance.        Patient seen and examined at bedside.  Pt complains of RUQ epigastric pain, diarrhea resolved denies nausea or vomiting. Leucocytosis remains elevated but trending down. Patient evaluated by GYN with recommendation for biopsy as an outpatient. Patient received phone # for dr. Velasquez and will follow up next week.         INTERVAL HPI/OVERNIGHT EVENTS: no new complaints    MEDICATIONS  (STANDING):  ciprofloxacin   IVPB 400 milliGRAM(s) IV Intermittent every 12 hours  heparin   Injectable 5000 Unit(s) SubCutaneous every 8 hours  metroNIDAZOLE  IVPB 500 milliGRAM(s) IV Intermittent every 8 hours  simvastatin 40 milliGRAM(s) Oral at bedtime  sodium chloride 0.9%. 1000 milliLiter(s) (80 mL/Hr) IV Continuous <Continuous>    MEDICATIONS  (PRN):  acetaminophen   Tablet .. 650 milliGRAM(s) Oral every 6 hours PRN Temp greater or equal to 38C (100.4F), Mild Pain (1 - 3)      __________________________________________________  REVIEW OF SYSTEMS:    CONSTITUTIONAL: No fever,   EYES: no acute visual disturbances  NECK: No pain or stiffness  RESPIRATORY: No cough; No shortness of breath  CARDIOVASCULAR: No chest pain, no palpitations  GASTROINTESTINAL: No pain. No nausea or vomiting; No diarrhea   NEUROLOGICAL: No headache or numbness, no tremors  MUSCULOSKELETAL: No joint pain, no muscle pain  GENITOURINARY: no dysuria, no frequency, no hesitancy  PSYCHIATRY: no depression , no anxiety  ALL OTHER  ROS negative        Vital Signs Last 24 Hrs  T(C): 36.8 (19 Oct 2020 04:41), Max: 37.2 (18 Oct 2020 13:41)  T(F): 98.3 (19 Oct 2020 04:41), Max: 98.9 (18 Oct 2020 13:41)  HR: 97 (19 Oct 2020 04:41) (94 - 104)  BP: 102/68 (19 Oct 2020 04:41) (91/74 - 116/75)  BP(mean): --  RR: 18 (19 Oct 2020 04:41) (18 - 18)  SpO2: 99% (19 Oct 2020 04:41) (95% - 99%)    ________________________________________________  PHYSICAL EXAM:  GENERAL: NAD  HEENT: Normocephalic;  conjunctivae and sclerae clear; moist mucous membranes;   NECK : supple  CHEST/LUNG: Clear to auscultation bilaterally with good air entry   HEART: S1 S2  regular; no murmurs, gallops or rubs  ABDOMEN: Soft, Nontender, Nondistended; Bowel sounds present  EXTREMITIES: no cyanosis; no edema; no calf tenderness  SKIN: warm and dry; no rash  NERVOUS SYSTEM:  Awake and alert; Oriented  to place, person and time ; no new deficits    _________________________________________________  LABS:                        10.4   13.49 )-----------( 294      ( 19 Oct 2020 07:10 )             31.8     10-19    137  |  107  |  7   ----------------------------<  91  3.7   |  24  |  0.96    Ca    8.1<L>      19 Oct 2020 07:10  Phos  2.0     10-19  Mg     2.1     10-19    TPro  5.6<L>  /  Alb  1.9<L>  /  TBili  0.4  /  DBili  x   /  AST  53<H>  /  ALT  72<H>  /  AlkPhos  124<H>  10-19        CAPILLARY BLOOD GLUCOSE      RADIOLOGY & ADDITIONAL TESTS:    EXAM:  CT ABDOMEN AND PELVIS IC                            PROCEDURE DATE:  10/17/2020          INTERPRETATION:  CLINICAL INFORMATION: Kidney stone removal    COMPARISON: CT abdomen and pelvis 8/25/2020    PROCEDURE:  CT of the Abdomen and Pelvis was performed with intravenous contrast.  Intravenous contrast: 90 ml Omnipaque 350. 10 ml discarded.  Oral contrast: None.  Sagittal and coronal reformats were performed.    FINDINGS:  LOWER CHEST: Mild bibasilar atelectasis.    LIVER: Within normal limits.  BILE DUCTS: Normal caliber.  GALLBLADDER: Within normal limits.  SPLEEN: Within normal limits.  PANCREAS: Within normal limits.  ADRENALS: Within normal limits.  KIDNEYS/URETERS: Small right renal cysts and numerous right renal cortical hypodensities which are too small to characterize. 2 mm nonobstructing calculus in the right renal pelvis. No hydronephrosis or right hydroureter.    BLADDER: Within normal limits.  REPRODUCTIVE ORGANS: 4.0 x 8.8 cm left adnexal or cervical mass. This is indeterminant. Malignancy is not excluded. Ultrasound evaluation is recommended. Atrophic left kidney with severe hydronephrosis and hydroureter to level of a left adnexal mass. 5.6 x 3.9 x 7.0 cm left perinephric homogeneous in density with Hounsfield units of 60. This is indeterminant this may represent a perinephric hematoma, mass, or hemorrhagic cyst.    BOWEL: No bowel obstruction. Appendix is normal. Diverticulosis without diverticulitis. Diffuse colonic wall thickening throughout the entire colon is new since the previous exam consistent with some form of pancolitis.  PERITONEUM: No ascites.  VESSELS: Within normal limits.  RETROPERITONEUM/LYMPH NODES: No lymphadenopathy.  ABDOMINAL WALL: Within normal limits.  BONES: Within normal limits.    IMPRESSION:  Pancolitis new since the previous exam.    Atrophic left kidney with severe hydronephrosis and hydroureter to level of a left adnexal or cervical mass. 5.6 x 3.9 x 7.0 cm left perinephric homogeneous in density with Hounsfield units of 60. This is indeterminant this may represent a perinephric hematoma, mass, or hemorrhagic cyst. New since the previous exam.    4.0 x 8.8 cm left adnexal or cervical mass. This is indeterminant. Malignancy is not excluded. Ultrasound evaluation is recommended.        JOLANTA MACIAS M.D., ATTENDING RADIOLOGIST  This document has been electronically signed. Oct 17 2020  2:13PM      EXAM:  US ABDOMEN COMPLETE                            PROCEDURE DATE:  10/17/2020          INTERPRETATION:  US ABDOMEN COMPLETE    HISTORY:  Hydronephrosis    COMPARISON: Same-day CT    Multiple transverse and longitudinal sonographic images of theabdomen were obtained.    LIVER: 14 cm in length. The liver is diffusely increased in echogenicity, consistent with fatty infiltration. No focal lesions are identified. Ultrasound sensitivity is somewhat limited in this setting. Main portal vein is patent with normal flow direction.    SPLEEN:  7.5 cm in length.    GALLBLADDER: No calculi, wall thickening, or pericholecystic fluid.  Hassan sign is absent.    BILE DUCTS: The common bile duct measures 3 mm.  No intrahepatic ductal dilatation.    PANCREAS:  The head and proximal body are normal. The distal body and tail are obscured.    RIGHT KIDNEY: 12 cm in length.  No hydronephrosis or shadowing stone.    LEFT KIDNEY:  9.2 cm in length. Severe hydronephrosis. No shadowing stone. Subcapsular hematoma again noted.    AORTA AND INFERIOR VENA CAVA: Normal in caliber.    FLUID: No ascites.    IMPRESSION:    Atrophic left kidney with severe hydronephrosis and subcapsular hematoma as on CT. Findings suggest chronicity.    Fatty liver        ZOHRA ACOSTA M.D., ATTENDING RADIOLOGIST  This document has been electronically signed. Oct 17 2020  6:37PM    Imaging  Reviewed:  YES    Consultant(s) Notes Reviewed:   YES      Plan of care was discussed with patient and /or primary care giver; all questions and concerns were addressed

## 2020-10-19 NOTE — CONSULT NOTE ADULT - PROBLEM SELECTOR RECOMMENDATION 9
-per medicine team, pt for discharge in the next 1-2 days  -recommend biopsy outpatient, Dr. Velasquez contact information given to the pt.   -d/w Dr. Avery, house attending -per medicine team, pt for discharge in the next 1-2 days  -recommend biopsy outpatient, Dr. Velasquez contact information given to the pt.   -reconsult GYN as needed   -d/w Dr. Avery, house attending -per medicine team, pt for discharge in the next 1-2 days  -tumor markers  -recommend biopsy outpatient, Dr. Velasquez contact information given to the pt.   -reconsult GYN as needed   -d/w Dr. Avery, house attending

## 2020-10-19 NOTE — PROGRESS NOTE ADULT - PROBLEM SELECTOR PLAN 1
p/w abdominal pain, diarrhea on admission  WBC 17K, , Temp 100.3  Sepsis likely due to Pancolitis  CT A/P:  1. Pancolitis  2. Atrophic left kidney with severe hydronephrosis and hydroureter to level of a left adnexal or cervical mass. 5.6 x 3.9 x 7.0 cm left perinephric homogeneous in density with Hounsfield units of 60. This is indeterminant this may represent a perinephric hematoma, mass, or hemorrhagic cyst.   3. 4.0 x 8.8 cm left adnexal or cervical mass. This is indeterminant. Malignancy is not excluded. Ultrasound evaluation is recommended.  UA negative  started on Ciprofloxacin and Flagyl  c/w iv fluids  f/u Blood Cx and Urine Cx  Urology consulted. p/w abdominal pain  WBC 13.5K trending down  c/w abx  Cipro and Flagyl for Pancolitis  Atrophic left kidney with severe hydronephrosis and hydroureter.   UA negative  c/w iv fluids  negative Blood Cx and Urine Cx  Urology consulted. p/w abdominal pain  WBC 13.5K trending down  c/w  Cipro and Flagyl for Pancolitis   c/w iv fluids  f/u CBC in AM

## 2020-10-19 NOTE — CONSULT NOTE ADULT - SUBJECTIVE AND OBJECTIVE BOX
[facility]  #[id number]    HPI: 66yo  postmenopausal F admitted to hospital with pancolitis. CT abd pelvis with incidental finding of cervical mass extending into the L parametrium. Pt denies abd pain, pelvic pain, vb, vaginal discharge, cp, sob, dizziness, palpitations, n/v/d/c, fever or chills     pob/gynhx: ; no std's; no abn pap smear; no fibroids; postmenopausal. Last normal pap smear 3 years ago.  pmhx: HTN, HLD, renal stones s/p bilateral ureteral stents  pshx: oophorectomy, unknown side, c/s x1, d&c x3  all: nka   meds: unknown per pt  sochx: no etoh/drug/tobacco use    REVIEW OF SYSTEMS: see HPI	    PE:  Vital Signs Last 24 Hrs  T(C): 36.7 (19 Oct 2020 13:43), Max: 36.8 (18 Oct 2020 16:55)  T(F): 98.1 (19 Oct 2020 13:43), Max: 98.3 (19 Oct 2020 04:41)  HR: 99 (19 Oct 2020 13:43) (94 - 99)  BP: 99/72 (19 Oct 2020 13:43) (99/72 - 116/75)  BP(mean): --  RR: 17 (19 Oct 2020 13:43) (17 - 18)  SpO2: 97% (19 Oct 2020 13:43) (95% - 99%)    abd: +bs; soft, nt, nd, no rebound or guarding; no cvat b/l  pelvis: no cmt; no vaginal bleeding or abnormal discharge; no odor, closed/long, No adnexal tenderness appreciated b/l.     LABS:                        10.4   13.49 )-----------( 294      ( 19 Oct 2020 07:10 )             31.8     10-19    137  |  107  |  7   ----------------------------<  91  3.7   |  24  |  0.96    Ca    8.1<L>      19 Oct 2020 07:10  Phos  2.0     10-  Mg     2.1     10-19    TPro  5.6<L>  /  Alb  1.9<L>  /  TBili  0.4  /  DBili  x   /  AST  53<H>  /  ALT  72<H>  /  AlkPhos  124<H>  10-19      RADIOLOGY & ADDITIONAL STUDIES:  < from: CT Abdomen and Pelvis w/ IV Cont (10.17.20 @ 13:52) >  EXAM:  CT ABDOMEN AND PELVIS IC                            PROCEDURE DATE:  10/17/2020          INTERPRETATION:  CLINICAL INFORMATION: Kidney stone removal    COMPARISON: CT abdomen and pelvis 2020    PROCEDURE:  CT of the Abdomen and Pelvis was performed with intravenous contrast.  Intravenous contrast: 90 ml Omnipaque 350. 10 ml discarded.  Oral contrast: None.  Sagittal and coronal reformats were performed.    FINDINGS:  LOWER CHEST: Mild bibasilar atelectasis.    LIVER: Within normal limits.  BILE DUCTS: Normal caliber.  GALLBLADDER: Within normal limits.  SPLEEN: Within normal limits.  PANCREAS: Within normal limits.  ADRENALS: Within normal limits.  KIDNEYS/URETERS: Small right renal cysts and numerous right renal cortical hypodensities which are too small to characterize. 2 mm nonobstructing calculus in the right renal pelvis. No hydronephrosis or right hydroureter.    BLADDER: Within normal limits.  REPRODUCTIVE ORGANS: 4.0 x 8.8 cm left adnexal or cervical mass. This is indeterminant. Malignancy is not excluded. Ultrasound evaluation is recommended. Atrophic left kidney with severe hydronephrosis and hydroureter to level of a left adnexal mass. 5.6 x 3.9 x 7.0 cm left perinephric homogeneous in density with Hounsfield units of 60. This is indeterminant this may represent a perinephric hematoma, mass, or hemorrhagic cyst.    BOWEL: No bowel obstruction. Appendix is normal. Diverticulosis without diverticulitis. Diffuse colonic wall thickening throughout the entire colon is new since the previous exam consistent with some form of pancolitis.  PERITONEUM: No ascites.  VESSELS: Within normal limits.  RETROPERITONEUM/LYMPH NODES: No lymphadenopathy.  ABDOMINAL WALL: Within normal limits.  BONES: Within normal limits.    IMPRESSION:  Pancolitis new since the previous exam.    Atrophic left kidney with severe hydronephrosis and hydroureter to level of a left adnexal or cervical mass. 5.6 x 3.9 x 7.0 cm left perinephric homogeneous in density with Hounsfield units of 60. This is indeterminant this may represent a perinephric hematoma, mass, or hemorrhagic cyst. New since the previous exam.    4.0 x 8.8 cm left adnexal or cervical mass. This is indeterminant. Malignancy is not excluded. Ultrasound evaluation is recommended.            JOLANTA MACIAS M.D., ATTENDING RADIOLOGIST  This document has been electronically signed. Oct 17 2020  2:13PM    < end of copied text >  < from: US Pelvis Complete (US Pelvis Complete .) (10.17.20 @ 18:19) >    EXAM:  US PELVIC COMPLETE                            PROCEDURE DATE:  10/17/2020          INTERPRETATION:  US PELVIS COMPLETE    HISTORY:  Pelvic mass    COMPARISON: Same-day CT  LAST MENSTRUAL PERIOD: Postmenopausal    TECHNIQUE: Pelvic ultrasoundwas performed transabdominally and transvaginally. A transvaginal ultrasound was ordered by the referring physician and is performed to better evaluate the endometrium and ovaries. Color and spectral Doppler interrogation of the bilateral ovaries is performed.      TRANSABDOMINAL EXAM: The uterus is normal in size and position. No adnexal mass.    TRANSVAGINAL EXAM:    UTERUS:  Orientation: Anteverted.  Size: 7.3 x 1.9 x 3.7 cm  Uterine Texture: Heterogenous    4.5 cm left cervical mass extendinginto the left parametrium.    ENDOMETRIUM: 2 mm in thickness, which is within normal limits.    RIGHT OVARY:  Not seen.    LEFT OVARY:  Not seen.    ADNEXA AND UTERINE LIGAMENTS: No mass or thickening.    CUL-DE-SAC: There is no free fluid.    IMPRESSION:    4.5 cm left cervical mass extending into the left parametrium.            ZOHRA ACOSTA M.D., ATTENDING RADIOLOGIST  This document has been electronically signed. Oct 17 2020  6:40PM    < end of copied text >

## 2020-10-20 ENCOUNTER — TRANSCRIPTION ENCOUNTER (OUTPATIENT)
Age: 65
End: 2020-10-20

## 2020-10-20 VITALS
TEMPERATURE: 99 F | SYSTOLIC BLOOD PRESSURE: 110 MMHG | HEART RATE: 101 BPM | RESPIRATION RATE: 18 BRPM | DIASTOLIC BLOOD PRESSURE: 84 MMHG | OXYGEN SATURATION: 99 %

## 2020-10-20 LAB
ALBUMIN SERPL ELPH-MCNC: 2 G/DL — LOW (ref 3.5–5)
ALP SERPL-CCNC: 152 U/L — HIGH (ref 40–120)
ALT FLD-CCNC: 64 U/L DA — HIGH (ref 10–60)
ANION GAP SERPL CALC-SCNC: 6 MMOL/L — SIGNIFICANT CHANGE UP (ref 5–17)
AST SERPL-CCNC: 54 U/L — HIGH (ref 10–40)
BASOPHILS # BLD AUTO: 0.06 K/UL — SIGNIFICANT CHANGE UP (ref 0–0.2)
BASOPHILS NFR BLD AUTO: 0.4 % — SIGNIFICANT CHANGE UP (ref 0–2)
BILIRUB SERPL-MCNC: 0.2 MG/DL — SIGNIFICANT CHANGE UP (ref 0.2–1.2)
BUN SERPL-MCNC: 7 MG/DL — SIGNIFICANT CHANGE UP (ref 7–18)
CALCIUM SERPL-MCNC: 8.3 MG/DL — LOW (ref 8.4–10.5)
CHLORIDE SERPL-SCNC: 105 MMOL/L — SIGNIFICANT CHANGE UP (ref 96–108)
CO2 SERPL-SCNC: 26 MMOL/L — SIGNIFICANT CHANGE UP (ref 22–31)
CREAT SERPL-MCNC: 1.05 MG/DL — SIGNIFICANT CHANGE UP (ref 0.5–1.3)
EOSINOPHIL # BLD AUTO: 0.3 K/UL — SIGNIFICANT CHANGE UP (ref 0–0.5)
EOSINOPHIL NFR BLD AUTO: 2.1 % — SIGNIFICANT CHANGE UP (ref 0–6)
GLUCOSE SERPL-MCNC: 156 MG/DL — HIGH (ref 70–99)
HCT VFR BLD CALC: 31.9 % — LOW (ref 34.5–45)
HGB BLD-MCNC: 10.9 G/DL — LOW (ref 11.5–15.5)
IMM GRANULOCYTES NFR BLD AUTO: 3.6 % — HIGH (ref 0–1.5)
LYMPHOCYTES # BLD AUTO: 1.78 K/UL — SIGNIFICANT CHANGE UP (ref 1–3.3)
LYMPHOCYTES # BLD AUTO: 12.2 % — LOW (ref 13–44)
MAGNESIUM SERPL-MCNC: 1.9 MG/DL — SIGNIFICANT CHANGE UP (ref 1.6–2.6)
MCHC RBC-ENTMCNC: 32.1 PG — SIGNIFICANT CHANGE UP (ref 27–34)
MCHC RBC-ENTMCNC: 34.2 GM/DL — SIGNIFICANT CHANGE UP (ref 32–36)
MCV RBC AUTO: 93.8 FL — SIGNIFICANT CHANGE UP (ref 80–100)
MONOCYTES # BLD AUTO: 1.14 K/UL — HIGH (ref 0–0.9)
MONOCYTES NFR BLD AUTO: 7.8 % — SIGNIFICANT CHANGE UP (ref 2–14)
NEUTROPHILS # BLD AUTO: 10.81 K/UL — HIGH (ref 1.8–7.4)
NEUTROPHILS NFR BLD AUTO: 73.9 % — SIGNIFICANT CHANGE UP (ref 43–77)
NRBC # BLD: 0 /100 WBCS — SIGNIFICANT CHANGE UP (ref 0–0)
PHOSPHATE SERPL-MCNC: 1.7 MG/DL — LOW (ref 2.5–4.5)
PLATELET # BLD AUTO: 307 K/UL — SIGNIFICANT CHANGE UP (ref 150–400)
POTASSIUM SERPL-MCNC: 3.2 MMOL/L — LOW (ref 3.5–5.3)
POTASSIUM SERPL-SCNC: 3.2 MMOL/L — LOW (ref 3.5–5.3)
PROT SERPL-MCNC: 5.9 G/DL — LOW (ref 6–8.3)
RBC # BLD: 3.4 M/UL — LOW (ref 3.8–5.2)
RBC # FLD: 12.9 % — SIGNIFICANT CHANGE UP (ref 10.3–14.5)
SODIUM SERPL-SCNC: 137 MMOL/L — SIGNIFICANT CHANGE UP (ref 135–145)
WBC # BLD: 14.61 K/UL — HIGH (ref 3.8–10.5)
WBC # FLD AUTO: 14.61 K/UL — HIGH (ref 3.8–10.5)

## 2020-10-20 PROCEDURE — 96365 THER/PROPH/DIAG IV INF INIT: CPT

## 2020-10-20 PROCEDURE — 71045 X-RAY EXAM CHEST 1 VIEW: CPT

## 2020-10-20 PROCEDURE — 76700 US EXAM ABDOM COMPLETE: CPT

## 2020-10-20 PROCEDURE — 80053 COMPREHEN METABOLIC PANEL: CPT

## 2020-10-20 PROCEDURE — 36415 COLL VENOUS BLD VENIPUNCTURE: CPT

## 2020-10-20 PROCEDURE — 81001 URINALYSIS AUTO W/SCOPE: CPT

## 2020-10-20 PROCEDURE — 83735 ASSAY OF MAGNESIUM: CPT

## 2020-10-20 PROCEDURE — 74177 CT ABD & PELVIS W/CONTRAST: CPT

## 2020-10-20 PROCEDURE — 86304 IMMUNOASSAY TUMOR CA 125: CPT

## 2020-10-20 PROCEDURE — 80061 LIPID PANEL: CPT

## 2020-10-20 PROCEDURE — 83690 ASSAY OF LIPASE: CPT

## 2020-10-20 PROCEDURE — 87086 URINE CULTURE/COLONY COUNT: CPT

## 2020-10-20 PROCEDURE — 99285 EMERGENCY DEPT VISIT HI MDM: CPT | Mod: 25

## 2020-10-20 PROCEDURE — 85025 COMPLETE CBC W/AUTO DIFF WBC: CPT

## 2020-10-20 PROCEDURE — 83036 HEMOGLOBIN GLYCOSYLATED A1C: CPT

## 2020-10-20 PROCEDURE — 93005 ELECTROCARDIOGRAM TRACING: CPT

## 2020-10-20 PROCEDURE — 86769 SARS-COV-2 COVID-19 ANTIBODY: CPT

## 2020-10-20 PROCEDURE — 76856 US EXAM PELVIC COMPLETE: CPT

## 2020-10-20 PROCEDURE — 84443 ASSAY THYROID STIM HORMONE: CPT

## 2020-10-20 PROCEDURE — 76830 TRANSVAGINAL US NON-OB: CPT

## 2020-10-20 PROCEDURE — 0225U NFCT DS DNA&RNA 21 SARSCOV2: CPT

## 2020-10-20 PROCEDURE — 83605 ASSAY OF LACTIC ACID: CPT

## 2020-10-20 PROCEDURE — 87040 BLOOD CULTURE FOR BACTERIA: CPT

## 2020-10-20 PROCEDURE — 84100 ASSAY OF PHOSPHORUS: CPT

## 2020-10-20 PROCEDURE — 82306 VITAMIN D 25 HYDROXY: CPT

## 2020-10-20 RX ORDER — CHOLECALCIFEROL (VITAMIN D3) 125 MCG
2000 CAPSULE ORAL
Qty: 30 | Refills: 0
Start: 2020-10-20 | End: 2020-11-18

## 2020-10-20 RX ORDER — METRONIDAZOLE 500 MG
1 TABLET ORAL
Qty: 18 | Refills: 0
Start: 2020-10-20 | End: 2020-10-25

## 2020-10-20 RX ORDER — MOXIFLOXACIN HYDROCHLORIDE TABLETS, 400 MG 400 MG/1
1 TABLET, FILM COATED ORAL
Qty: 12 | Refills: 0
Start: 2020-10-20 | End: 2020-10-25

## 2020-10-20 RX ORDER — POTASSIUM CHLORIDE 20 MEQ
40 PACKET (EA) ORAL ONCE
Refills: 0 | Status: COMPLETED | OUTPATIENT
Start: 2020-10-20 | End: 2020-10-20

## 2020-10-20 RX ORDER — METRONIDAZOLE 500 MG
500 TABLET ORAL ONCE
Refills: 0 | Status: COMPLETED | OUTPATIENT
Start: 2020-10-20 | End: 2020-10-20

## 2020-10-20 RX ORDER — SODIUM,POTASSIUM PHOSPHATES 278-250MG
1 POWDER IN PACKET (EA) ORAL ONCE
Refills: 0 | Status: COMPLETED | OUTPATIENT
Start: 2020-10-20 | End: 2020-10-20

## 2020-10-20 RX ORDER — SIMVASTATIN 20 MG/1
1 TABLET, FILM COATED ORAL
Qty: 0 | Refills: 0 | DISCHARGE

## 2020-10-20 RX ADMIN — HEPARIN SODIUM 5000 UNIT(S): 5000 INJECTION INTRAVENOUS; SUBCUTANEOUS at 06:37

## 2020-10-20 RX ADMIN — Medication 500 MILLIGRAM(S): at 13:22

## 2020-10-20 RX ADMIN — Medication 40 MILLIEQUIVALENT(S): at 13:22

## 2020-10-20 RX ADMIN — Medication 1 PACKET(S): at 13:22

## 2020-10-20 RX ADMIN — Medication 200 MILLIGRAM(S): at 06:37

## 2020-10-20 RX ADMIN — Medication 100 MILLIGRAM(S): at 06:37

## 2020-10-20 NOTE — DISCHARGE NOTE PROVIDER - CARE PROVIDER_API CALL
Amanda Gallardo  INTERNAL MEDICINE  [address] [place] [id number]  Phone: [phone]  Fax: [phone]  Follow Up Time: 1 week    Graeme Batista  UROLOGY  [id number] [place] [address]  Phone: [phone]  Fax: [phone]  Follow Up Time: 1 week

## 2020-10-20 NOTE — DISCHARGE NOTE PROVIDER - NSFOLLOWUPCLINICS_GEN_ALL_ED_FT
[facility]  [facility]  [address] [place] [address]  Phone: [phone]  Fax: [phone]  Follow Up Time: 1 week

## 2020-10-20 NOTE — DISCHARGE NOTE PROVIDER - NSDCCPCAREPLAN_GEN_ALL_CORE_FT
PRINCIPAL DISCHARGE DIAGNOSIS  Diagnosis: Pancolitis  Assessment and Plan of Treatment: You came in mergency room due to abdominal pain and found to have colitis from CT scan   You were on IV antibiotics during this hospital admission and complete 2 antibiotics at home as prescribed  Read home care instruction as below   HOME CARE INSTRUCTIONS  Get plenty of rest.  Drink enough water and fluids to keep your urine clear or pale yellow.  Eat a well-balanced diet.  Call your caregiver for follow-up as recommended.  SEEK IMMEDIATE MEDICAL CARE IF:  You develop chills.  You have an oral temperature above 101F, not controlled by medicine.  You have extreme weakness, fainting, or dehydration.  You have repeated vomiting.  You develop severe belly (abdominal) pain or are passing bloody or tarry stools      SECONDARY DISCHARGE DIAGNOSES  Diagnosis: Transaminitis  Assessment and Plan of Treatment: not significant at this time but your liver enzyme is slightly elevated than normal limits, this can occurs when you take antibitoics or anything affect to your liver such as tylenol. Try not to take tyleno for pain and follow up with your primary physician for leve check via blood.      Diagnosis: HAMMAD (acute kidney injury)  Assessment and Plan of Treatment: resolved   to avoid worsning of your kidney function   - Avoid taking (NSAIDs) - (ex: Ibuprofen, Advil, Celebrex, Naprosyn)  - Avoid taking any nephrotoxic agents (can harm kidneys)   - Intravenous contrast for diagnostic testing, combination cold medications.  Have all medications adjusted for your renal function by your Health Care Provider.  Blood pressure control is important.  Take all medication as prescribed.    Diagnosis: Bilateral hydronephrosis  Assessment and Plan of Treatment: You have history of bilateral ureteral stents in the past and found to have severe bilateral hydronephrosis again at this time  as oer urology in the hospital there is nothing urgent work up needed as in-patient wise  but advise you to see urology as out-patient   If you develop severe flank pain or changes in urination please call primary physician       Diagnosis: Cervical lesion  Assessment and Plan of Treatment: You underwent to CT scan for abdominal pain and incidently found Left side cervical mass. GYN team evaluated your case and need biopsy as out-patient which also hematology/ oncology ( Dr. Gallardo, [facility]) agreed on the plan  Meantime, if you have severe pain or bleeding fro vagina, come to emergency or visit urgent care.     PRINCIPAL DISCHARGE DIAGNOSIS  Diagnosis: Pancolitis  Assessment and Plan of Treatment: You came in mergency room due to abdominal pain and found to have colitis from CT scan   You were on IV antibiotics during this hospital admission and complete 2 antibiotics at home as prescribed  Read home care instruction as below   HOME CARE INSTRUCTIONS  Get plenty of rest.  Drink enough water and fluids to keep your urine clear or pale yellow.  Eat a well-balanced diet.  Call your caregiver for follow-up as recommended.  SEEK IMMEDIATE MEDICAL CARE IF:  You develop chills.  You have an oral temperature above 101F, not controlled by medicine.  You have extreme weakness, fainting, or dehydration.  You have repeated vomiting.  You develop severe belly (abdominal) pain or are passing bloody or tarry stools      SECONDARY DISCHARGE DIAGNOSES  Diagnosis: Transaminitis  Assessment and Plan of Treatment: not significant at this time but your liver enzyme is slightly elevated than normal limits, this can occurs when you take antibitoics or anything affect to your liver such as tylenol. Try not to take tyleno for pain and stop taking simvastatin for next 2 weeks follow up with your primary physician for leve check via blood within 2 weeks       Diagnosis: HAMMAD (acute kidney injury)  Assessment and Plan of Treatment: resolved   to avoid worsning of your kidney function   - Avoid taking (NSAIDs) - (ex: Ibuprofen, Advil, Celebrex, Naprosyn)  - Avoid taking any nephrotoxic agents (can harm kidneys)   - Intravenous contrast for diagnostic testing, combination cold medications.  Have all medications adjusted for your renal function by your Health Care Provider.  Blood pressure control is important.  Take all medication as prescribed.    Diagnosis: Bilateral hydronephrosis  Assessment and Plan of Treatment: You have history of bilateral ureteral stents in the past and found to have severe bilateral hydronephrosis again at this time  as oer urology in the hospital there is nothing urgent work up needed as in-patient wise  but advise you to see urology as out-patient   If you develop severe flank pain or changes in urination please call primary physician       Diagnosis: Cervical lesion  Assessment and Plan of Treatment: You underwent to CT scan for abdominal pain and incidently found Left side cervical mass. GYN team evaluated your case and need biopsy as out-patient which also hematology/ oncology ( Dr. Gallardo, [facility]) agreed on the plan  Meantime, if you have severe pain or bleeding fro vagina, come to emergency or visit urgent care.     PRINCIPAL DISCHARGE DIAGNOSIS  Diagnosis: Pancolitis  Assessment and Plan of Treatment: You came in mergency room due to abdominal pain and found to have colitis from CT scan   You were on IV antibiotics during this hospital admission and complete 2 antibiotics at home as prescribed  Read home care instruction as below   HOME CARE INSTRUCTIONS  Get plenty of rest.  Drink enough water and fluids to keep your urine clear or pale yellow.  Eat a well-balanced diet.  Call your caregiver for follow-up as recommended.  SEEK IMMEDIATE MEDICAL CARE IF:  You develop chills.  You have an oral temperature above 101F, not controlled by medicine.  You have extreme weakness, fainting, or dehydration.  You have repeated vomiting.  You develop severe belly (abdominal) pain or are passing bloody or tarry stools      SECONDARY DISCHARGE DIAGNOSES  Diagnosis: Electrolyte imbalance  Assessment and Plan of Treatment: likely due to pancolitis   all replaced and call your primary physician and get blood work in 1 week    Diagnosis: Transaminitis  Assessment and Plan of Treatment: not significant at this time but your liver enzyme is slightly elevated than normal limits, this can occurs when you take antibitoics or anything affect to your liver such as tylenol. Try not to take tyleno for pain and stop taking simvastatin for next 2 weeks follow up with your primary physician for leve check via blood within 2 weeks       Diagnosis: HAMMAD (acute kidney injury)  Assessment and Plan of Treatment: resolved   to avoid worsning of your kidney function   - Avoid taking (NSAIDs) - (ex: Ibuprofen, Advil, Celebrex, Naprosyn)  - Avoid taking any nephrotoxic agents (can harm kidneys)   - Intravenous contrast for diagnostic testing, combination cold medications.  Have all medications adjusted for your renal function by your Health Care Provider.  Blood pressure control is important.  Take all medication as prescribed.    Diagnosis: Bilateral hydronephrosis  Assessment and Plan of Treatment: You have history of bilateral ureteral stents in the past and found to have severe bilateral hydronephrosis again at this time  as oer urology in the hospital there is nothing urgent work up needed as in-patient wise  but advise you to see urology as out-patient   If you develop severe flank pain or changes in urination please call primary physician       Diagnosis: Cervical lesion  Assessment and Plan of Treatment: You underwent to CT scan for abdominal pain and incidently found Left side cervical mass. GYN team evaluated your case and need biopsy as out-patient which also hematology/ oncology ( Dr. Gallardo, [facility]) agreed on the plan  Meantime, if you have severe pain or bleeding fro vagina, come to emergency or visit urgent care.

## 2020-10-20 NOTE — DISCHARGE NOTE NURSING/CASE MANAGEMENT/SOCIAL WORK - PATIENT PORTAL LINK FT
You can access the FollowMyHealth Patient Portal offered by [facility] by registering at the following website: [email or web address]. By joining [employer]’s FollowMyHealth portal, you will also be able to view your health information using other applications (apps) compatible with our system.

## 2020-10-20 NOTE — DISCHARGE NOTE PROVIDER - NSDCMRMEDTOKEN_GEN_ALL_CORE_FT
amLODIPine 5 mg oral tablet: 1 tab(s) orally once a day  Cipro 500 mg oral tablet: 1 tab(s) orally every 12 hours   simvastatin 40 mg oral tablet: 1 tab(s) orally once a day (at bedtime)   amLODIPine 5 mg oral tablet: 1 tab(s) orally once a day  cholecalciferol oral tablet: 2000 unit(s) orally once a day  Cipro 500 mg oral tablet: 1 tab(s) orally every 12 hours   Flagyl 500 mg oral tablet: 1 tab(s) orally every 8 hours   simvastatin 40 mg oral tablet: 1 tab(s) orally once a day (at bedtime)   amLODIPine 5 mg oral tablet: 1 tab(s) orally once a day  cholecalciferol oral tablet: 2000 unit(s) orally once a day  Cipro 500 mg oral tablet: 1 tab(s) orally every 12 hours   Flagyl 500 mg oral tablet: 1 tab(s) orally every 8 hours   simvastatin 40 mg oral tablet: 1 tab(s) orally once a day (at bedtime)-- stop taking for next 2 weeks ( 10/20 to 11/3 and get blood work to check your liver enzyme

## 2020-10-20 NOTE — DISCHARGE NOTE PROVIDER - PROVIDER TOKENS
PROVIDER:[TOKEN:[[id number]],FOLLOWUP:[1 week]],PROVIDER:[TOKEN:[[id number]],FOLLOWUP:[1 week]]

## 2020-10-20 NOTE — DISCHARGE NOTE PROVIDER - HOSPITAL COURSE
65 F with PMH of HTN, HLD, renal stones s/p bilateral ureteral stents presents to ED with epigastric pain and diarrhea X 7 days prior to ED visit.  In ED patient found to have leucocytosis and pancolitis on CT started treatment with antibiotics. Patient was admitted to medicine for pancolitis.  Treate with IV ABT for pancolitis, afebrile, no abd pain reported for past 2 days. Patient found to have atrophic left kidney with severe hydronephrosis and left adnexal/cervical mass not seen on previous exam. Urology, gyn and Heme/onc consulted for further assistance.  Pt needs biopsy as out-pt for cervical mass.   Colitis.  Plan: p/w abdominal pain  WBC 13.5K trending down  c/w  Cipro and Flagyl for Pancolitis   outpatient biopsy dr. Velasquez @ [id number] [facility]  CT A/P:  Atrophic left kidney with severe hydronephrosis and hydroureter.   urology onboard  recommended renal Lasix scan- nuclear medicine  elevated CR (0.96) resolved    Urology consulted.  Patient seen and examined at bedside.  Pt complains of RUQ epigastric pain, diarrhea resolved denies nausea or vomiting. Leucocytosis remains elevated but trending down. Patient evaluated by GYN with recommendation for biopsy as an outpatient. Patient received phone # for dr. Velasquez and will follow up next week. 65 F with PMH of HTN, HLD, renal stones s/p bilateral ureteral stents presents to ED with epigastric pain and diarrhea X 7 days prior to ED visit.  In ED patient found to have leucocytosis and pancolitis on CT started treatment with antibiotics. Patient was admitted to medicine for pancolitis.  Treate with IV ABT for pancolitis, afebrile, no abd pain reported for past 2 days. Patient found to have atrophic left kidney with severe hydronephrosis and left adnexal/cervical mass not seen on previous exam. Urology, gyn and Heme/onc consulted for further assistance.  Pt needs biopsy as out-pt for cervical mass. Abdominal pain and HAMMAD resolved. Thus, decision was made for discharge home with antibiotic for pancolitis.   Colitis.  Plan: p/w abdominal pain  WBC 13.5K trending down  c/w  Cipro and Flagyl for Pancolitis   outpatient biopsy dr. Velasquez @ [id number] [facility]  CT A/P:  Atrophic left kidney with severe hydronephrosis and hydroureter.   urology onboard  recommended renal Lasix scan- nuclear medicine  elevated CR (0.96) resolved    Urology consulted.  Patient seen and examined at bedside.  Pt complains of RUQ epigastric pain, diarrhea resolved denies nausea or vomiting. Leucocytosis remains elevated but trending down. Patient evaluated by GYN with recommendation for biopsy as an outpatient. Patient received phone # for dr. Velasquez and will follow up next week.

## 2020-10-20 NOTE — PROGRESS NOTE ADULT - REASON FOR ADMISSION
Abdominal pain and diarrhea

## 2020-10-20 NOTE — PROGRESS NOTE ADULT - SUBJECTIVE AND OBJECTIVE BOX
feel good  no more diarrhea    MEDICATIONS  (STANDING):  cholecalciferol 2000 Unit(s) Oral daily  ciprofloxacin   IVPB 400 milliGRAM(s) IV Intermittent every 12 hours  heparin   Injectable 5000 Unit(s) SubCutaneous every 8 hours  metroNIDAZOLE  IVPB 500 milliGRAM(s) IV Intermittent every 8 hours  simvastatin 40 milliGRAM(s) Oral at bedtime  sodium chloride 0.9%. 1000 milliLiter(s) (80 mL/Hr) IV Continuous <Continuous>    MEDICATIONS  (PRN):  acetaminophen   Tablet .. 650 milliGRAM(s) Oral every 6 hours PRN Temp greater or equal to 38C (100.4F), Mild Pain (1 - 3)      Allergies    No Known Allergies    Intolerances        Vital Signs Last 24 Hrs  T(C): 36.7 (20 Oct 2020 05:31), Max: 37.2 (19 Oct 2020 21:12)  T(F): 98.1 (20 Oct 2020 05:31), Max: 99 (19 Oct 2020 21:12)  HR: 91 (20 Oct 2020 05:31) (87 - 99)  BP: 109/77 (20 Oct 2020 05:31) (99/72 - 109/77)  BP(mean): --  RR: 18 (20 Oct 2020 05:31) (17 - 18)  SpO2: 96% (20 Oct 2020 05:31) (96% - 97%)    PHYSICAL EXAM  General: adult in NAD  HEENT: clear oropharynx, anicteric sclera, pink conjunctiva  Neck: supple  CV: normal S1/S2 with no murmur rubs or gallops  Lungs: positive air movement b/l ant lungs,clear to auscultation, no wheezes, no rales  Abdomen: soft non-tender non-distended, no hepatosplenomegaly  Ext: no clubbing cyanosis or edema  Skin: no rashes and no petechiae  Neuro: alert and oriented X 4, no focal deficits  LABS:                          10.4   13.49 )-----------( 294      ( 19 Oct 2020 07:10 )             31.8         Mean Cell Volume : 95.5 fl  Mean Cell Hemoglobin : 31.2 pg  Mean Cell Hemoglobin Concentration : 32.7 gm/dL  Auto Neutrophil # : 9.17 K/uL  Auto Lymphocyte # : 2.02 K/uL  Auto Monocyte # : 1.62 K/uL  Auto Eosinophil # : 0.54 K/uL  Auto Basophil # : 0.00 K/uL  Auto Neutrophil % : 65.0 %  Auto Lymphocyte % : 15.0 %  Auto Monocyte % : 12.0 %  Auto Eosinophil % : 4.0 %  Auto Basophil % : 0.0 %    Serial CBC  Hematocrit 31.8  Hemoglobin 10.4  Plat 294  RBC 3.33  WBC 13.49  Serial CBC  Hematocrit 31.1  Hemoglobin 10.1  Plat 261  RBC 3.22  WBC 15.67  Serial CBC  Hematocrit 34.8  Hemoglobin 11.7  Plat 287  RBC 3.64  WBC 17.84    10-19    137  |  107  |  7   ----------------------------<  91  3.7   |  24  |  0.96    Ca    8.1<L>      19 Oct 2020 07:10  Phos  2.0     10-19  Mg     2.1     10-19    TPro  5.6<L>  /  Alb  1.9<L>  /  TBili  0.4  /  DBili  x   /  AST  53<H>  /  ALT  72<H>  /  AlkPhos  124<H>  10-19                    BLOOD SMEAR INTERPRETATION:       RADIOLOGY & ADDITIONAL STUDIES:

## 2020-10-20 NOTE — PROGRESS NOTE ADULT - ASSESSMENT
· Assessment	  65 year old lady with bilat hydro with stents admitted for diarrhea and pancolitis.  CT found a large cervical mass.    Problem/Recommendation - 1:  Problem: Cervical lesion. Recommendation: a large cervical mass, not seen in CT done in Aug  needs a biopsy asap. will follow up as outpt    Problem/Recommendation - 2:  ·  Problem: Pancolitis.  Recommendation: may be due to antibiotics taken at home for stents  r/o c.diff.

## 2020-10-22 LAB
CHOLEST SERPL-MCNC: 123 MG/DL — SIGNIFICANT CHANGE UP
CULTURE RESULTS: SIGNIFICANT CHANGE UP
CULTURE RESULTS: SIGNIFICANT CHANGE UP
HDLC SERPL-MCNC: 35 MG/DL — LOW
LIPID PNL WITH DIRECT LDL SERPL: 71 MG/DL — SIGNIFICANT CHANGE UP
NON HDL CHOLESTEROL: 88 MG/DL — SIGNIFICANT CHANGE UP
SPECIMEN SOURCE: SIGNIFICANT CHANGE UP
SPECIMEN SOURCE: SIGNIFICANT CHANGE UP
TRIGL SERPL-MCNC: 86 MG/DL — SIGNIFICANT CHANGE UP

## 2020-10-25 PROBLEM — Z86.79 HISTORY OF ESSENTIAL HYPERTENSION: Status: RESOLVED | Noted: 2020-10-25 | Resolved: 2020-10-25

## 2020-10-25 PROBLEM — N13.4 HYDROURETER: Status: RESOLVED | Noted: 2020-10-25 | Resolved: 2020-10-25

## 2020-10-26 ENCOUNTER — APPOINTMENT (OUTPATIENT)
Dept: GYNECOLOGIC ONCOLOGY | Facility: CLINIC | Age: 65
End: 2020-10-26
Payer: COMMERCIAL

## 2020-10-26 VITALS
WEIGHT: 161 LBS | SYSTOLIC BLOOD PRESSURE: 117 MMHG | DIASTOLIC BLOOD PRESSURE: 77 MMHG | HEART RATE: 125 BPM | TEMPERATURE: 97.5 F | BODY MASS INDEX: 29.63 KG/M2 | HEIGHT: 62 IN | OXYGEN SATURATION: 97 %

## 2020-10-26 DIAGNOSIS — Z86.79 PERSONAL HISTORY OF OTHER DISEASES OF THE CIRCULATORY SYSTEM: ICD-10-CM

## 2020-10-26 DIAGNOSIS — N13.4 HYDROURETER: ICD-10-CM

## 2020-10-26 PROCEDURE — 99072 ADDL SUPL MATRL&STAF TM PHE: CPT

## 2020-10-26 PROCEDURE — 99205 OFFICE O/P NEW HI 60 MIN: CPT

## 2020-10-27 LAB — HPV HIGH+LOW RISK DNA PNL CVX: NOT DETECTED

## 2020-10-28 LAB
CANCER AG125 SERPL-ACNC: 15 U/ML
CANCER AG19-9 SERPL-ACNC: 5 U/ML
CEA SERPL-MCNC: 1.5 NG/ML

## 2020-11-09 ENCOUNTER — APPOINTMENT (OUTPATIENT)
Age: 65
End: 2020-11-09
Payer: COMMERCIAL

## 2020-11-09 ENCOUNTER — APPOINTMENT (OUTPATIENT)
Dept: GYNECOLOGIC ONCOLOGY | Facility: CLINIC | Age: 65
End: 2020-11-09
Payer: COMMERCIAL

## 2020-11-09 VITALS
BODY MASS INDEX: 29.44 KG/M2 | HEART RATE: 82 BPM | SYSTOLIC BLOOD PRESSURE: 123 MMHG | DIASTOLIC BLOOD PRESSURE: 88 MMHG | OXYGEN SATURATION: 97 % | TEMPERATURE: 98.2 F | WEIGHT: 160 LBS | HEIGHT: 62 IN

## 2020-11-09 DIAGNOSIS — N28.9 DISORDER OF KIDNEY AND URETER, UNSPECIFIED: ICD-10-CM

## 2020-11-09 DIAGNOSIS — Z09 ENCOUNTER FOR FOLLOW-UP EXAMINATION AFTER COMPLETED TREATMENT FOR CONDITIONS OTHER THAN MALIGNANT NEOPLASM: ICD-10-CM

## 2020-11-09 PROCEDURE — 99072 ADDL SUPL MATRL&STAF TM PHE: CPT

## 2020-11-09 PROCEDURE — 99214 OFFICE O/P EST MOD 30 MIN: CPT

## 2020-11-09 PROCEDURE — 99204 OFFICE O/P NEW MOD 45 MIN: CPT

## 2020-11-09 RX ORDER — ALBUTEROL SULFATE 90 UG/1
108 (90 BASE) AEROSOL, METERED RESPIRATORY (INHALATION) EVERY 6 HOURS
Qty: 1 | Refills: 6 | Status: ACTIVE | COMMUNITY
Start: 2019-07-09

## 2020-11-09 RX ORDER — SIMVASTATIN 40 MG/1
40 TABLET, FILM COATED ORAL
Qty: 90 | Refills: 0 | Status: ACTIVE | COMMUNITY
Start: 2019-03-18

## 2020-11-09 RX ORDER — CIPROFLOXACIN HYDROCHLORIDE 500 MG/1
500 TABLET, FILM COATED ORAL
Qty: 12 | Refills: 0 | Status: ACTIVE | COMMUNITY
Start: 2020-10-20

## 2020-11-09 RX ORDER — CEPHALEXIN 500 MG/1
500 CAPSULE ORAL
Qty: 6 | Refills: 0 | Status: ACTIVE | COMMUNITY
Start: 2020-08-28

## 2020-11-09 RX ORDER — METRONIDAZOLE 500 MG/1
500 TABLET ORAL
Qty: 18 | Refills: 0 | Status: ACTIVE | COMMUNITY
Start: 2020-10-20

## 2020-11-09 RX ORDER — AMLODIPINE BESYLATE 5 MG/1
5 TABLET ORAL
Qty: 90 | Refills: 0 | Status: ACTIVE | COMMUNITY
Start: 2019-05-15

## 2020-11-12 ENCOUNTER — OUTPATIENT (OUTPATIENT)
Dept: OUTPATIENT SERVICES | Facility: HOSPITAL | Age: 65
LOS: 1 days | End: 2020-11-12
Payer: COMMERCIAL

## 2020-11-12 ENCOUNTER — APPOINTMENT (OUTPATIENT)
Dept: MRI IMAGING | Facility: CLINIC | Age: 65
End: 2020-11-12
Payer: COMMERCIAL

## 2020-11-12 VITALS
WEIGHT: 160.06 LBS | HEIGHT: 60 IN | OXYGEN SATURATION: 96 % | SYSTOLIC BLOOD PRESSURE: 120 MMHG | DIASTOLIC BLOOD PRESSURE: 86 MMHG | HEART RATE: 87 BPM | TEMPERATURE: 99 F | RESPIRATION RATE: 16 BRPM

## 2020-11-12 DIAGNOSIS — Z98.890 OTHER SPECIFIED POSTPROCEDURAL STATES: Chronic | ICD-10-CM

## 2020-11-12 DIAGNOSIS — Z98.891 HISTORY OF UTERINE SCAR FROM PREVIOUS SURGERY: Chronic | ICD-10-CM

## 2020-11-12 DIAGNOSIS — Z90.721 ACQUIRED ABSENCE OF OVARIES, UNILATERAL: Chronic | ICD-10-CM

## 2020-11-12 DIAGNOSIS — G47.33 OBSTRUCTIVE SLEEP APNEA (ADULT) (PEDIATRIC): ICD-10-CM

## 2020-11-12 DIAGNOSIS — R19.00 INTRA-ABDOMINAL AND PELVIC SWELLING, MASS AND LUMP, UNSPECIFIED SITE: ICD-10-CM

## 2020-11-12 DIAGNOSIS — I10 ESSENTIAL (PRIMARY) HYPERTENSION: ICD-10-CM

## 2020-11-12 PROBLEM — N28.9 NONFUNCTIONING KIDNEY: Status: ACTIVE | Noted: 2020-11-12

## 2020-11-12 LAB
ALBUMIN SERPL ELPH-MCNC: 4 G/DL — SIGNIFICANT CHANGE UP (ref 3.3–5)
ALP SERPL-CCNC: 73 U/L — SIGNIFICANT CHANGE UP (ref 40–120)
ALT FLD-CCNC: 21 U/L — SIGNIFICANT CHANGE UP (ref 4–33)
ANION GAP SERPL CALC-SCNC: 11 MMO/L — SIGNIFICANT CHANGE UP (ref 7–14)
AST SERPL-CCNC: 21 U/L — SIGNIFICANT CHANGE UP (ref 4–32)
BILIRUB SERPL-MCNC: 0.2 MG/DL — SIGNIFICANT CHANGE UP (ref 0.2–1.2)
BLD GP AB SCN SERPL QL: POSITIVE — SIGNIFICANT CHANGE UP
BUN SERPL-MCNC: 17 MG/DL — SIGNIFICANT CHANGE UP (ref 7–23)
CALCIUM SERPL-MCNC: 9.7 MG/DL — SIGNIFICANT CHANGE UP (ref 8.4–10.5)
CHLORIDE SERPL-SCNC: 102 MMOL/L — SIGNIFICANT CHANGE UP (ref 98–107)
CO2 SERPL-SCNC: 25 MMOL/L — SIGNIFICANT CHANGE UP (ref 22–31)
CREAT SERPL-MCNC: 1.12 MG/DL — SIGNIFICANT CHANGE UP (ref 0.5–1.3)
DAT POLY-SP REAG RBC QL: NEGATIVE — SIGNIFICANT CHANGE UP
GLUCOSE SERPL-MCNC: 88 MG/DL — SIGNIFICANT CHANGE UP (ref 70–99)
HCT VFR BLD CALC: 37.9 % — SIGNIFICANT CHANGE UP (ref 34.5–45)
HGB BLD-MCNC: 12.2 G/DL — SIGNIFICANT CHANGE UP (ref 11.5–15.5)
MCHC RBC-ENTMCNC: 31.5 PG — SIGNIFICANT CHANGE UP (ref 27–34)
MCHC RBC-ENTMCNC: 32.2 % — SIGNIFICANT CHANGE UP (ref 32–36)
MCV RBC AUTO: 97.9 FL — SIGNIFICANT CHANGE UP (ref 80–100)
NRBC # FLD: 0 K/UL — SIGNIFICANT CHANGE UP (ref 0–0)
PLATELET # BLD AUTO: 340 K/UL — SIGNIFICANT CHANGE UP (ref 150–400)
PMV BLD: 9.2 FL — SIGNIFICANT CHANGE UP (ref 7–13)
POTASSIUM SERPL-MCNC: 4.1 MMOL/L — SIGNIFICANT CHANGE UP (ref 3.5–5.3)
POTASSIUM SERPL-SCNC: 4.1 MMOL/L — SIGNIFICANT CHANGE UP (ref 3.5–5.3)
PROT SERPL-MCNC: 7.4 G/DL — SIGNIFICANT CHANGE UP (ref 6–8.3)
RBC # BLD: 3.87 M/UL — SIGNIFICANT CHANGE UP (ref 3.8–5.2)
RBC # FLD: 14 % — SIGNIFICANT CHANGE UP (ref 10.3–14.5)
RH IG SCN BLD-IMP: POSITIVE — SIGNIFICANT CHANGE UP
SODIUM SERPL-SCNC: 138 MMOL/L — SIGNIFICANT CHANGE UP (ref 135–145)
WBC # BLD: 9.81 K/UL — SIGNIFICANT CHANGE UP (ref 3.8–10.5)
WBC # FLD AUTO: 9.81 K/UL — SIGNIFICANT CHANGE UP (ref 3.8–10.5)

## 2020-11-12 PROCEDURE — A9585: CPT

## 2020-11-12 PROCEDURE — 72197 MRI PELVIS W/O & W/DYE: CPT | Mod: 26

## 2020-11-12 PROCEDURE — 86077 PHYS BLOOD BANK SERV XMATCH: CPT

## 2020-11-12 PROCEDURE — 72197 MRI PELVIS W/O & W/DYE: CPT

## 2020-11-12 RX ORDER — AMLODIPINE BESYLATE 2.5 MG/1
1 TABLET ORAL
Qty: 0 | Refills: 0 | DISCHARGE

## 2020-11-12 RX ORDER — SIMVASTATIN 20 MG/1
1 TABLET, FILM COATED ORAL
Qty: 0 | Refills: 0 | DISCHARGE

## 2020-11-12 RX ORDER — SODIUM CHLORIDE 9 MG/ML
1000 INJECTION INTRAMUSCULAR; INTRAVENOUS; SUBCUTANEOUS
Refills: 0 | Status: DISCONTINUED | OUTPATIENT
Start: 2020-11-17 | End: 2020-11-18

## 2020-11-12 NOTE — H&P PST ADULT - GASTROINTESTINAL DETAILS
soft/nontender/no bruit/bowel sounds normal/no distention bowel sounds normal/soft/no distention/nontender

## 2020-11-12 NOTE — H&P PST ADULT - NS MD HP PULSE DORSALIS
CC: Cholelithiasis    HISTORY OF PRESENT ILLNESS:  This is a 54-year-old male who presents to the office for evaluation of abdominal pain. Initially noted Wednesday after eating a fatty meal, located in the epigastrium with radiation to the back. Associated nausea, no vomiting. No change in bowel habits. No fevers. Pain improved, however, on 4/6 the pain recurred in the middle the night prompting presentation to the emergency department. At that time his white blood cell count was normal but he did demonstrate modest elevation of transaminases, and alkaline phosphatase of 139, and a total bilirubin of 1.1. Creatinine is 1.2. An ultrasound of the right upper quadrant revealed no evidence of cholelithiasis, no gallbladder wall thickening or pericholecystic fluid. Common bile duct was 4 mm.    In retrospect he does report some occasional mild \"indigestion\" which was also associated with radiation to the back, although these episodes were much less severe and were self-limited.    Surgical history significant for hip replacement in 2015 as well as back surgery several months ago at an outside institution. No history of abdominal surgery. No history of upper endoscopy or peptic ulcer disease.    PAST MEDICAL HISTORY:    Back pain                                                     Pure hypercholesterolemia                                     Hypertrophy of prostate with urinary obstructi* 9/5/2015      PONV (postoperative nausea and vomiting)                      Motion sickness                                               Arthritis                                                     PAST SURGICAL HISTORY:    OPEN ACCESS COLONOSCOPY                         2006,2011       Comment: [facility]; polyps; FHx colon CA    PELVIS/HIP JOINT SURGERY UNLISTED               8/13            Comment: L hip resurfacing, labral tear-  Matthew    CYSTOURETHROSCOPY                               9/5/2015        Comment: Dr AMRIT Butler/ [facility]     ARTHROSCOPY HIP W FEMOROPLASTY                  07/25/2013    ARTHROSCOPY HIP W FEMOROPLASTY                  12/11/2013    TOTAL HIP REPLACEMENT                           12/09/2015      Comment: Dr. Marino    MEDICATIONS:   Current Outpatient Medications   Medication   • pantoprazole (PROTONIX) 40 MG tablet   • simvastatin (ZOCOR) 10 MG tablet   • alfuzosin (UROXATRAL) 10 MG 24 hr tablet   • cephALEXin (KEFLEX) 500 MG capsule   • aspirin 81 MG tablet     No current facility-administered medications for this visit.        ALLERGIES:  ALLERGIES:  No Known Allergies    FAMILY HISTORY:  Family History   Problem Relation Age of Onset   • Cancer Father 60        colon       SOCIAL HISTORY:    Tobacco Use: Never             Alcohol Use: Yes                Comment: rare      REVIEW OF SYSTEMS:  See HPI    LABS:  WBC (K/mcL)   Date Value   04/06/2019 8.6     RBC (mil/mcL)   Date Value   04/06/2019 4.99     HCT (%)   Date Value   04/06/2019 43.2     HGB (g/dL)   Date Value   04/06/2019 14.4     PLT   Date Value   04/06/2019 233 K/mcL   06/06/2011 223 K/uL       Sodium (mmol/L)   Date Value   04/06/2019 142     Potassium (mmol/L)   Date Value   04/06/2019 3.6     Chloride (mmol/L)   Date Value   04/06/2019 103     Glucose (mg/dL)   Date Value   04/06/2019 113 (H)     CALCIUM (mg/dL)   Date Value   04/06/2019 9.4   07/11/2012 9.0     CO2 (mmol/L)   Date Value   07/11/2012 28     Carbon Dioxide (mmol/L)   Date Value   04/06/2019 31     BUN (mg/dL)   Date Value   04/06/2019 19     Creatinine (mg/dL)   Date Value   04/06/2019 1.22 (H)       No results found for: INR  AST/SGOT (Units/L)   Date Value   04/06/2019 68 (H)     ALT/SGPT (Units/L)   Date Value   04/06/2019 324 (H)     No results found for: GGTP  ALK PHOSPHATASE (Units/L)   Date Value   04/06/2019 139 (H)     TOTAL BILIRUBIN (mg/dL)   Date Value   04/06/2019 1.1 (H)     Albumin (g/dL)   Date Value   04/06/2019 3.8         PHYSICAL EXAM:  Vitals:    04/09/19 1600   BP:  132/87   Pulse: 61   Temp: 98 °F (36.7 °C)     GENERAL:  NAD, comfortable  PSYCHIATRIC: Appropriate affect and insight, good eye contact, no pressured speech  INTEGUMENT: Skin warm,  no jaundice  CARDIOVASCULAR:  Regular rate and rhythm  RESPIRATORY: Normal respiratory effort  HEENT: No scleral injection or icterus, mucous membranes moist  ABDOMEN: Not distended, no scars. Minimal tenderness to palpation in the epigastrium, negative Jenkins sign. Remainder of abdomen is soft and nontender.    ASSESSMENT AND PLAN:  This is a 54-year-old male with epigastric pain of somewhat uncertain etiology. Symptoms as well as laboratory evaluation would indicate a biliary origin, likely biliary dyskinesia. His ultrasound does not demonstrate cholelithiasis, however, potentially microlithiasis or sludge is present and is resulting in the current constellation of findings. He has no significant history that would suggest peptic ulcer disease. Discussed with him the option of upper endoscopy, HIDA scan, or further imaging prior to proceeding but he would like to move ahead with surgery which is reasonable. Risks, benefits, and alternatives were discussed at length.  Thank you for allowing us to participate in the care of this patient. Please do not hesitate to call with any questions.     left normal/right normal

## 2020-11-12 NOTE — H&P PST ADULT - NEGATIVE OPHTHALMOLOGIC SYMPTOMS
no blurred vision L/no blurred vision R/no pain L/no pain R/no loss of vision R/no loss of vision L/no diplopia/no photophobia

## 2020-11-12 NOTE — H&P PST ADULT - NEGATIVE ENMT SYMPTOMS
no nose bleeds/no sinus symptoms/no throat pain/no dysphagia/no hearing difficulty/no ear pain/no tinnitus/no vertigo

## 2020-11-12 NOTE — H&P PST ADULT - NSICDXPROBLEM_GEN_ALL_CORE_FT
PROBLEM DIAGNOSES  Problem: Intra-abdominal and pelvic swelling of mass or lump  Assessment and Plan: Pt is tentatively scheduled for robotic assisted total laparoscopic hysterectomy, bilateral salpingo oophorectomy possible debulking for 11/17/20. Pre-op instructions provided. Pt given verbal and written instructions with teach back on chlorhexidine shampoo and pepcid. Pt has a scheduled preop COVID test.  Pt verbalized understanding with return demonstration.   64 y/o F h/o HTN HLD atropic kidney JUSTINA asthma anemia pancolitis in 10/2020.   PFTs 2019 in chart.  EKG 10/17/20 in chart.  Last urology note in chart.  Pending medical evaluation.     Problem: JUSTINA (obstructive sleep apnea)  Assessment and Plan: Sleep study 2019 in chart.  OR booking notified, +JUSTINA, does not use device.     Problem: HTN (hypertension)  Assessment and Plan: Patient instructed to take amlodipine with a sip of water on the morning of procedure.

## 2020-11-12 NOTE — H&P PST ADULT - LAB RESULTS AND INTERPRETATION
cbc cmp type and screen done (K was 3.2 in 10/2020, anemia, and elevated liver enzymes) - patient was treated for pancolitis in 10/2020

## 2020-11-12 NOTE — H&P PST ADULT - NEGATIVE SKIN SYMPTOMS
Dear Phan,     I discussed your plan of care with Dr Banegas and since you are going to be on Prednisone for a while there is a risk of further bone loss. At this time, you have a low bone density (also called osteopenia) but no evidence of osteoporosis. However, with Prednisone use chronically, treatment is indicated.     At this time, I recommend using Reclast IV infusion.   The benefit is that you will prevent further bone loss, and therefore fractures. While on steroids you have moderate fracture risk (19%).   The side effects of Bisphosphonate (such as Reclast)  Include flulike symptoms, low calcium after infusion and rarely after long duration of treatment include atypical fractures and delayed healing after major dental treatments. Dental evaluation is recommended prior to start of this treatment. If you want to discuss this further with me, you do have an appointment in May with me. At this time continue taking Calcium and Vitamin D supplements. If you want to go ahead with treatments, please let me know so that I can order this for you.     Ruth Oakley MD  [id number]  Endocrinology Service     no rash/no itching

## 2020-11-12 NOTE — H&P PST ADULT - NSICDXPASTSURGICALHX_GEN_ALL_CORE_FT
PAST SURGICAL HISTORY:  H/O unilateral oophorectomy     History of  section     History of cystoscopy     History of cystoscopy bilateral ureteral stent placement, lithotripsy 2020  ureteral stents removed 10/2020

## 2020-11-12 NOTE — H&P PST ADULT - NEGATIVE NEUROLOGICAL SYMPTOMS
no focal seizures/no vertigo/no tremors/no headache/no transient paralysis/no weakness/no paresthesias/no generalized seizures/no syncope

## 2020-11-12 NOTE — H&P PST ADULT - RS GEN PE MLT RESP DETAILS PC
respirations non-labored/good air movement/no rhonchi/no wheezes/airway patent/no rales/breath sounds equal/clear to auscultation bilaterally

## 2020-11-12 NOTE — H&P PST ADULT - NEGATIVE GENERAL GENITOURINARY SYMPTOMS
no bladder infections/no incontinence/normal urinary frequency/no hematuria/no dysuria/no urine discoloration

## 2020-11-12 NOTE — H&P PST ADULT - HISTORY OF PRESENT ILLNESS
65 year old female presents to presurgical testing with diagnosis of intra-abdominal and pelvic swelling, mass and lump scheduled for robotic assisted total laparoscopic hysterectomy, bilateral salpingo oophorectomy possible debulking. Pt with history of kidney stones s/p lithotripsy and placement of ureteral stents in 8/2020, which were removed 10/2020, imaging found a left pelvic mass and left atrophic kidney. Pt was treated for pancolitis in 10/2020. Pt denies pelvic pain or abnormal vaginal bleeding.

## 2020-11-12 NOTE — H&P PST ADULT - NSICDXPASTMEDICALHX_GEN_ALL_CORE_FT
PAST MEDICAL HISTORY:  Anemia     Asthma     Calculus of kidney with calculus of ureter     HLD (hyperlipidemia)     HTN (hypertension)     Intra-abdominal and pelvic swelling of mass or lump     JUSTINA (obstructive sleep apnea) severe, does not use an inhaler    Pancolitis treated in 10/2020    Renal stone

## 2020-11-12 NOTE — H&P PST ADULT - NEGATIVE GASTROINTESTINAL SYMPTOMS
no diarrhea/no nausea/no vomiting/no abdominal pain/no change in bowel habits/no flatulence/no constipation/no melena

## 2020-11-12 NOTE — H&P PST ADULT - ATTENDING COMMENTS
patient seen and evaluated planned for EUA, DX laparoscopy, biopsies, possible RA TLH, BSO, debulking, all indicated procedures, discussed possible need for closure and biopsy only, if mass appears to be originating from the cervix, then willl perform biopsy only  all questions answered,  on line  discussed risks including bleeding, infection, injury to bowel/bladder/vessels/nerves/ureters, risks of blood transfusion, reoperation, ICU admission  possible nephrectomy with Dr. Cloud pending intraop findings.

## 2020-11-12 NOTE — H&P PST ADULT - NEGATIVE MUSCULOSKELETAL SYMPTOMS
no back pain/no muscle weakness/no joint swelling/no stiffness/no neck pain/no myalgia/no arthritis/no muscle cramps

## 2020-11-12 NOTE — H&P PST ADULT - LANGUAGE ASSISTANCE NEEDED
No-Patient/Caregiver offered and refused free interpretation services. No-Patient/Caregiver offered and refused free interpretation services./[facility]us #[id number]

## 2020-11-12 NOTE — HISTORY OF PRESENT ILLNESS
[FreeTextEntry1] : 66 yo Mohawk speaking F found to have a left sided pelvic mass\par Imaging also showed an atrophic left kidney with severe hydronephrosis down to the level of pelvic mass\par Pt denies any history of significant left flank pain\par Pt is s/p right URS/HLL in August

## 2020-11-12 NOTE — H&P PST ADULT - SOURCE OF INFORMATION, PROFILE
allscripts, accompanied by . Pt is Uzbek speaking, offered interpretation services but pt prefers to use  for interpretation/patient

## 2020-11-12 NOTE — ASSESSMENT
[FreeTextEntry1] : 66 yo F with left pelvic mass pending robotic excision with gyn-onc, atrophic left kidney\par \par - Renal scan pending\par - Discussed options with pt. Should renal scan confirm kidney is nonfunctioning, will plan for lap left simple nephrectomy. If there is still signfiicant function left, will plan for stent placement, possible ureteral reimplant\par \par Addendum: Renal scan reviewed and confirms nonfunctioning kidney. Discussed with Dr. Velasquez and will plan for lap left simple nephrectomy. Message left for pt and .

## 2020-11-13 PROBLEM — G47.33 OBSTRUCTIVE SLEEP APNEA (ADULT) (PEDIATRIC): Chronic | Status: ACTIVE | Noted: 2020-11-12

## 2020-11-13 PROBLEM — D64.9 ANEMIA, UNSPECIFIED: Chronic | Status: ACTIVE | Noted: 2020-11-12

## 2020-11-13 PROBLEM — K51.00 ULCERATIVE (CHRONIC) PANCOLITIS WITHOUT COMPLICATIONS: Chronic | Status: ACTIVE | Noted: 2020-11-12

## 2020-11-13 PROBLEM — J45.909 UNSPECIFIED ASTHMA, UNCOMPLICATED: Chronic | Status: ACTIVE | Noted: 2020-11-12

## 2020-11-13 PROBLEM — R19.00 INTRA-ABDOMINAL AND PELVIC SWELLING, MASS AND LUMP, UNSPECIFIED SITE: Chronic | Status: ACTIVE | Noted: 2020-11-12

## 2020-11-13 LAB
ANTIBODY ID 1_1: SIGNIFICANT CHANGE UP
ANTIBODY ID 1_1: SIGNIFICANT CHANGE UP

## 2020-11-14 ENCOUNTER — APPOINTMENT (OUTPATIENT)
Dept: DISASTER EMERGENCY | Facility: CLINIC | Age: 65
End: 2020-11-14

## 2020-11-14 DIAGNOSIS — Z01.818 ENCOUNTER FOR OTHER PREPROCEDURAL EXAMINATION: ICD-10-CM

## 2020-11-15 LAB — SARS-COV-2 N GENE NPH QL NAA+PROBE: NOT DETECTED

## 2020-11-16 ENCOUNTER — TRANSCRIPTION ENCOUNTER (OUTPATIENT)
Age: 65
End: 2020-11-16

## 2020-11-16 RX ORDER — NEOMYCIN SULFATE 500 MG/1
500 TABLET ORAL
Qty: 6 | Refills: 0 | Status: ACTIVE | COMMUNITY
Start: 2020-11-16 | End: 1900-01-01

## 2020-11-16 RX ORDER — METRONIDAZOLE 500 MG/1
500 TABLET ORAL
Qty: 3 | Refills: 0 | Status: ACTIVE | COMMUNITY
Start: 2020-11-16 | End: 1900-01-01

## 2020-11-16 NOTE — ASU PATIENT PROFILE, ADULT - VISION (WITH CORRECTIVE LENSES IF THE PATIENT USUALLY WEARS THEM):
READING GLASSES Partially impaired: cannot see medication labels or newsprint, but can see obstacles in path, and the surrounding layout; can count fingers at arm's length

## 2020-11-16 NOTE — ASU PATIENT PROFILE, ADULT - LANGUAGE ASSISTANCE NEEDED
[facility] #[id number]/No-Patient/Caregiver offered and refused free interpretation services.

## 2020-11-16 NOTE — ASU PATIENT PROFILE, ADULT - PMH
Anemia    Asthma    Calculus of kidney with calculus of ureter    HLD (hyperlipidemia)    HTN (hypertension)    Intra-abdominal and pelvic swelling of mass or lump    JUSTINA (obstructive sleep apnea)  severe, does not use an inhaler  Pancolitis  treated in 10/2020  Renal stone

## 2020-11-16 NOTE — ASU PATIENT PROFILE, ADULT - PAIN CHRONIC, PROFILE
Medicare Wellness Visit  Plan for Preventive Care    A good way for you to stay healthy is to use preventive care.  Medicare covers many services that can help you stay healthy.* The goal of these services is to find any health problems as quickly as possible. Finding problems early can help make them easier to treat.  Your personal plan below lists the services you may need and when they are due.     Health Maintenance Summary     Shingles Vaccine (2 of 3)  Overdue since 12/2/2008    Influenza Vaccine (1)  Overdue since 9/1/2019    DTaP/Tdap/Td Vaccine (2 - Td)  Overdue since 1/8/2020    Medicare Wellness 65+ (Yearly)  Due since 2/26/2020    Depression Screening (Yearly)  Next due on 2/27/2021    Pneumococcal Vaccine 65+   Completed    Meningococcal Vaccine   Aged Out           Preventive Care for Women and Men    Heart Screenings (Cardiovascular):  · Blood tests are used to check your cholesterol, lipid and triglyceride levels. High levels can increase your risk for heart disease and stroke. High levels can be treated with medications, diet and exercise. Lowering your levels can help keep your heart and blood vessels healthy.  Your provider will order these tests if they are needed.    · An ultrasound is done to see if you have an abdominal aortic aneurysm (AAA).  This is an enlargement of one of the main blood vessels that delivers blood to the body.   In the [place], 9,000 deaths are caused by AAA.  You may not even know you have this problem and as many as 1 in 3 people will have a serious problem if it is not treated.  Early diagnosis allows for more effective treatment and cure.  If you have a family history of AAA or are a male age 65-75 who has smoked, you are at higher risk of an AAA.  Your provider can order this test, if needed.    Colorectal Screening:  · There are many tests that are used to check for cancer of your colon and rectum. You and your provider should discuss what test is best for  you and when to have it done.  Options include:  · Screening Colonoscopy: exam of the entire colon, seen through a flexible lighted tube.  · Flexible Sigmoidoscopy: exam of the last third (sigmoid portion) of the colon and rectum, seen through a flexible lighted tube.  · Cologuard DNA stool test: a sample of your stool is used to screen for cancer and unseen blood in your stool.  · Fecal Occult Blood Test: a sample of your stool is studied to find any unseen blood    Flu Shot:  · An immunization that helps to prevent influenza (the flu). You should get this every year. The best time to get the shot is in the fall.    Pneumococcal Shot:  • Vaccines are available that can help prevent pneumococcal disease, which is any type of infection caused by Streptococcus pneumoniae bacteria.   Their use can prevent some cases of pneumonia, meningitis, and sepsis. There are two types of pneumococcal vaccines:   o Conjugate vaccines (PCV-13 or Prevnar 13®) - helps protect against the 13 types of pneumococcal bacteria that are the most common causes of serious infections in children and adults.    o Polysaccharide vaccine (PPSV23 or Vffdansac06®) - helps protect against 23 types of pneumococcal bacteria for patients who are recommended to get it.  These vaccines should be given at least 12 months apart.  A booster is usually not needed.     Hepatitis B Shot:  · An immunization that helps to protect people from getting Hepatitis B. Hepatitis B is a virus that spreads through contact with infected blood or body fluids. Many people with the virus do not have symptoms.  The virus can lead to serious problems, such as liver disease. Some people are at higher risk than others. Your doctor will tell you if you need this shot.     Diabetes Screening:  · A test to measure sugar (glucose) in your blood is called a fasting blood sugar. Fasting means you cannot have food or drink for at least 8 hours before the test. This test can detect  diabetes long before you may notice symptoms.    Glaucoma Screening:  · Glaucoma screening is performed by your eye doctor. The test measures the fluid pressure inside your eyes to determine if you have glaucoma.     Hepatitis C Screening:  · A blood test to see if you have the hepatitis C virus.  Hepatitis C attacks the liver and is a major cause of chronic liver disease.  Medicare will cover a single screening for all adults born between 1945 & 1965, or high risk patients (people who have injected illegal drugs or people who have had blood transfusions).  High risk patients who continue to inject illegal drugs can be screened for Hepatitis C every year.    Smoking and Tobacco-Use Cessation Counseling:  · Tobacco is the single greatest cause of disease and early death in our country today. Medication and counseling together can increase a person’s chance of quitting for good.   · Medicare covers two quitting attempts per year, with four counseling sessions per attempt (eight sessions in a 12 month period)    Preventive Screening tests for Women    Screening Mammograms and Breast Exams:  · An x-ray of your breasts to check for breast cancer before you or your doctor may be able to feel it.  If breast cancer is found early it can usually be treated with success.    Pelvic Exams and Pap Tests:  · An exam to check for cervical and vaginal cancer. A Pap test is a lab test in which cells are taken from your cervix and sent to the lab to look for signs of cervical cancer. If cancer of the cervix is found early, chances for a cure are good. Testing can generally end at age 65, or if a woman has a hysterectomy for a benign condition. Your provider may recommend more frequent testing if certain abnormal results are found.    Bone Mass Measurements:  · A painless x-ray of your bone density to see if you are at risk for a broken bone. Bone density refers to the thickness of bones or how tightly the bone tissue is  packed.    Preventive Screening tests for Men    Prostate Screening:  · Should you have a prostate cancer test (PSA)?  It is up to you to decide if you want a prostate cancer test. Talk to your clinician to find out if the test is right for you.  Things for you to consider and talk about should include:  · Benefits and harms of the test  · Your family history  · How your race/ethnicity may influence the test  · If the test may impact other medical conditions you have  · Your values on screenings and treatments    *Medicare pays for many preventive services to keep you healthy. For some of these services, you might have to pay a deductible, coinsurance, and / or copayment.  The amounts vary depending on the type of services you need and the kind of Medicare health plan you have.               no

## 2020-11-16 NOTE — ASU PATIENT PROFILE, ADULT - PSH
H/O unilateral oophorectomy    History of  section    History of cystoscopy  bilateral ureteral stent placement, lithotripsy 2020  ureteral stents removed 10/2020  History of cystoscopy

## 2020-11-17 ENCOUNTER — RESULT REVIEW (OUTPATIENT)
Age: 65
End: 2020-11-17

## 2020-11-17 ENCOUNTER — TRANSCRIPTION ENCOUNTER (OUTPATIENT)
Age: 65
End: 2020-11-17

## 2020-11-17 ENCOUNTER — APPOINTMENT (OUTPATIENT)
Dept: UROLOGY | Facility: HOSPITAL | Age: 65
End: 2020-11-17

## 2020-11-17 ENCOUNTER — INPATIENT (INPATIENT)
Facility: HOSPITAL | Age: 65
LOS: 0 days | Discharge: HOME | End: 2020-11-18
Attending: OBSTETRICS & GYNECOLOGY | Admitting: OBSTETRICS & GYNECOLOGY
Payer: COMMERCIAL

## 2020-11-17 ENCOUNTER — APPOINTMENT (OUTPATIENT)
Dept: GYNECOLOGIC ONCOLOGY | Facility: HOSPITAL | Age: 65
End: 2020-11-17

## 2020-11-17 VITALS
SYSTOLIC BLOOD PRESSURE: 132 MMHG | DIASTOLIC BLOOD PRESSURE: 84 MMHG | HEART RATE: 95 BPM | TEMPERATURE: 98 F | OXYGEN SATURATION: 100 % | WEIGHT: 160.06 LBS | HEIGHT: 60 IN | RESPIRATION RATE: 14 BRPM

## 2020-11-17 DIAGNOSIS — Z98.891 HISTORY OF UTERINE SCAR FROM PREVIOUS SURGERY: Chronic | ICD-10-CM

## 2020-11-17 DIAGNOSIS — R19.00 INTRA-ABDOMINAL AND PELVIC SWELLING, MASS AND LUMP, UNSPECIFIED SITE: ICD-10-CM

## 2020-11-17 DIAGNOSIS — Z98.890 OTHER SPECIFIED POSTPROCEDURAL STATES: Chronic | ICD-10-CM

## 2020-11-17 DIAGNOSIS — Z90.721 ACQUIRED ABSENCE OF OVARIES, UNILATERAL: Chronic | ICD-10-CM

## 2020-11-17 LAB
BASE EXCESS BLDA CALC-SCNC: -1.3 MMOL/L — SIGNIFICANT CHANGE UP
BLD GP AB SCN SERPL QL: POSITIVE — SIGNIFICANT CHANGE UP
CA-I BLDA-SCNC: 1.23 MMOL/L — SIGNIFICANT CHANGE UP (ref 1.15–1.29)
DAT C3-SP REAG RBC QL: NEGATIVE — SIGNIFICANT CHANGE UP
DAT POLY-SP REAG RBC QL: NEGATIVE — SIGNIFICANT CHANGE UP
DIRECT COOMBS IGG: NEGATIVE — SIGNIFICANT CHANGE UP
GLUCOSE BLDA-MCNC: 119 MG/DL — HIGH (ref 70–99)
HCO3 BLDA-SCNC: 24 MMOL/L — SIGNIFICANT CHANGE UP (ref 22–26)
HCT VFR BLDA CALC: 35.3 % — SIGNIFICANT CHANGE UP (ref 34.5–46.5)
HGB BLDA-MCNC: 11.5 G/DL — SIGNIFICANT CHANGE UP (ref 11.5–15.5)
PCO2 BLDA: 36 MMHG — SIGNIFICANT CHANGE UP (ref 32–48)
PH BLDA: 7.41 PH — SIGNIFICANT CHANGE UP (ref 7.35–7.45)
PO2 BLDA: 159 MMHG — HIGH (ref 83–108)
POTASSIUM BLDA-SCNC: 3.4 MMOL/L — SIGNIFICANT CHANGE UP (ref 3.4–4.5)
RH IG SCN BLD-IMP: POSITIVE — SIGNIFICANT CHANGE UP
SAO2 % BLDA: 99.2 % — HIGH (ref 95–99)
SODIUM BLDA-SCNC: 139 MMOL/L — SIGNIFICANT CHANGE UP (ref 136–146)

## 2020-11-17 PROCEDURE — 88302 TISSUE EXAM BY PATHOLOGIST: CPT | Mod: 26

## 2020-11-17 PROCEDURE — 88307 TISSUE EXAM BY PATHOLOGIST: CPT | Mod: 26

## 2020-11-17 PROCEDURE — 88305 TISSUE EXAM BY PATHOLOGIST: CPT | Mod: 26

## 2020-11-17 PROCEDURE — 52332 CYSTOSCOPY AND TREATMENT: CPT | Mod: LT

## 2020-11-17 PROCEDURE — 88112 CYTOPATH CELL ENHANCE TECH: CPT | Mod: 26

## 2020-11-17 PROCEDURE — 88331 PATH CONSLTJ SURG 1 BLK 1SPC: CPT | Mod: 26

## 2020-11-17 PROCEDURE — 88332 PATH CONSLTJ SURG EA ADD BLK: CPT | Mod: 26

## 2020-11-17 RX ORDER — HYDROMORPHONE HYDROCHLORIDE 2 MG/ML
1 INJECTION INTRAMUSCULAR; INTRAVENOUS; SUBCUTANEOUS
Refills: 0 | Status: DISCONTINUED | OUTPATIENT
Start: 2020-11-17 | End: 2020-11-17

## 2020-11-17 RX ORDER — AMLODIPINE BESYLATE 2.5 MG/1
5 TABLET ORAL DAILY
Refills: 0 | Status: DISCONTINUED | OUTPATIENT
Start: 2020-11-17 | End: 2020-11-18

## 2020-11-17 RX ORDER — ACETAMINOPHEN 500 MG
1000 TABLET ORAL EVERY 6 HOURS
Refills: 0 | Status: DISCONTINUED | OUTPATIENT
Start: 2020-11-17 | End: 2020-11-18

## 2020-11-17 RX ORDER — HYDROMORPHONE HYDROCHLORIDE 2 MG/ML
0.5 INJECTION INTRAMUSCULAR; INTRAVENOUS; SUBCUTANEOUS
Refills: 0 | Status: DISCONTINUED | OUTPATIENT
Start: 2020-11-17 | End: 2020-11-17

## 2020-11-17 RX ORDER — OXYCODONE HYDROCHLORIDE 5 MG/1
5 TABLET ORAL EVERY 4 HOURS
Refills: 0 | Status: DISCONTINUED | OUTPATIENT
Start: 2020-11-17 | End: 2020-11-18

## 2020-11-17 RX ORDER — OXYCODONE HYDROCHLORIDE 5 MG/1
5 TABLET ORAL EVERY 6 HOURS
Refills: 0 | Status: DISCONTINUED | OUTPATIENT
Start: 2020-11-17 | End: 2020-11-17

## 2020-11-17 RX ORDER — OXYCODONE HYDROCHLORIDE 5 MG/1
1 TABLET ORAL
Qty: 12 | Refills: 0
Start: 2020-11-17 | End: 2020-11-19

## 2020-11-17 RX ORDER — INFLUENZA VIRUS VACCINE 15; 15; 15; 15 UG/.5ML; UG/.5ML; UG/.5ML; UG/.5ML
0.5 SUSPENSION INTRAMUSCULAR ONCE
Refills: 0 | Status: COMPLETED | OUTPATIENT
Start: 2020-11-17 | End: 2020-11-17

## 2020-11-17 RX ORDER — SIMETHICONE 80 MG/1
1 TABLET, CHEWABLE ORAL
Qty: 0 | Refills: 0 | DISCHARGE
Start: 2020-11-17

## 2020-11-17 RX ORDER — HEPARIN SODIUM 5000 [USP'U]/ML
5000 INJECTION INTRAVENOUS; SUBCUTANEOUS EVERY 8 HOURS
Refills: 0 | Status: DISCONTINUED | OUTPATIENT
Start: 2020-11-17 | End: 2020-11-18

## 2020-11-17 RX ORDER — OXYCODONE HYDROCHLORIDE 5 MG/1
1 TABLET ORAL
Qty: 0 | Refills: 0 | DISCHARGE
Start: 2020-11-17

## 2020-11-17 RX ORDER — SODIUM CHLORIDE 9 MG/ML
1000 INJECTION, SOLUTION INTRAVENOUS
Refills: 0 | Status: DISCONTINUED | OUTPATIENT
Start: 2020-11-17 | End: 2020-11-18

## 2020-11-17 RX ORDER — ONDANSETRON 8 MG/1
8 TABLET, FILM COATED ORAL EVERY 8 HOURS
Refills: 0 | Status: DISCONTINUED | OUTPATIENT
Start: 2020-11-17 | End: 2020-11-18

## 2020-11-17 RX ORDER — SIMETHICONE 80 MG/1
80 TABLET, CHEWABLE ORAL EVERY 6 HOURS
Refills: 0 | Status: DISCONTINUED | OUTPATIENT
Start: 2020-11-17 | End: 2020-11-18

## 2020-11-17 RX ORDER — ONDANSETRON 8 MG/1
1 TABLET, FILM COATED ORAL
Qty: 0 | Refills: 0 | DISCHARGE
Start: 2020-11-17

## 2020-11-17 RX ADMIN — SODIUM CHLORIDE 125 MILLILITER(S): 9 INJECTION, SOLUTION INTRAVENOUS at 15:58

## 2020-11-17 RX ADMIN — SODIUM CHLORIDE 125 MILLILITER(S): 9 INJECTION, SOLUTION INTRAVENOUS at 20:33

## 2020-11-17 RX ADMIN — OXYCODONE HYDROCHLORIDE 5 MILLIGRAM(S): 5 TABLET ORAL at 16:30

## 2020-11-17 RX ADMIN — OXYCODONE HYDROCHLORIDE 5 MILLIGRAM(S): 5 TABLET ORAL at 15:57

## 2020-11-17 RX ADMIN — Medication 1000 MILLIGRAM(S): at 21:30

## 2020-11-17 RX ADMIN — Medication 1000 MILLIGRAM(S): at 20:32

## 2020-11-17 RX ADMIN — HEPARIN SODIUM 5000 UNIT(S): 5000 INJECTION INTRAVENOUS; SUBCUTANEOUS at 18:24

## 2020-11-17 NOTE — BRIEF OPERATIVE NOTE - NSICDXBRIEFPROCEDURE_GEN_ALL_CORE_FT
PROCEDURES:  Oophorectomy 17-Nov-2020 13:11:48 left Olga Kim  Bilateral salpingectomy 17-Nov-2020 13:11:33  Olga Kim  Robot-assisted total hysterectomy for uterus less than 250 grams 17-Nov-2020 13:10:45  Olga Kim

## 2020-11-17 NOTE — DISCHARGE NOTE PROVIDER - NSDCFUADDINST_GEN_ALL_CORE_FT
nothing per vagina. No douching, no tampons, no dilators, no sex until cleared by your doctor. Please call your doctor if you develop fevers, chills, nausea, vomiting, pain not releved by Tylenol and/or Motrin. take Oxycodone as neded for breakthrough pain.

## 2020-11-17 NOTE — DISCHARGE NOTE PROVIDER - NSDCMRMEDTOKEN_GEN_ALL_CORE_FT
amLODIPine 5 mg oral tablet: 1 tab(s) orally once a day  ondansetron 8 mg oral tablet: 1 tab(s) orally every 8 hours, As needed, Nausea and/or Vomiting  oxyCODONE 5 mg oral tablet: 1 tab(s) orally every 4 hours, As needed, Moderate Pain (4 - 6)  simethicone 80 mg oral tablet, chewable: 1 tab(s) orally every 6 hours, As needed, Gas  simvastatin 40 mg oral tablet: 1 tab(s) orally once a day (at bedtime)   amLODIPine 5 mg oral tablet: 1 tab(s) orally once a day  ondansetron 8 mg oral tablet: 1 tab(s) orally every 8 hours, As needed, Nausea and/or Vomiting  oxyCODONE 5 mg oral tablet: 1 tab(s) orally every 6 hours, As Needed - for severe pain MDD:3 tabs  oxyCODONE 5 mg oral tablet: 1 tab(s) orally every 4 hours, As needed, Moderate Pain (4 - 6)  simethicone 80 mg oral tablet, chewable: 1 tab(s) orally every 6 hours, As needed, Gas  simvastatin 40 mg oral tablet: 1 tab(s) orally once a day (at bedtime)  Tylenol Extra Strength 500 mg oral tablet: 2 tab(s) orally every 6 hours MDD:8 tabs

## 2020-11-17 NOTE — BRIEF OPERATIVE NOTE - OPERATION/FINDINGS
Small punch biopsy taken and sent for immediate frozen upon entry, frozen =benign, followed by above procedure, ~8cm L pelvic mass contiguous with uterine cervix/JENNIFER, multiple intra-abdominal adhesions taken down, Frozen=benign, fibroid. Gonzalo placed over surgical site. Good hemostasis noted.

## 2020-11-17 NOTE — ASU PREOP CHECKLIST - 1.
Pt's primary language is Finnish, speaks some English, gives her  'Kailash Rockwell' permission to translate any areas she has difficulty understanding

## 2020-11-17 NOTE — CHART NOTE - NSCHARTNOTEFT_GEN_A_CORE
Patient seen and examined at bedside, recently post-op. No acute complaints at this time. Pt report she drank water and feels well. Denies CP, SOB, N/V, fevers, chills, or any other concerns.    Vital Signs Last 24 Hours  T(C): 36.5 (11-17-20 @ 14:30), Max: 36.7 (11-17-20 @ 07:09)  HR: 72 (11-17-20 @ 14:30) (72 - 98)  BP: 125/84 (11-17-20 @ 14:30) (112/83 - 132/84)  RR: 13 (11-17-20 @ 14:30) (11 - 15)  SpO2: 95% (11-17-20 @ 14:30) (94% - 100%)    I&O's Summary    17 Nov 2020 07:01  -  17 Nov 2020 16:12  --------------------------------------------------------  IN: 0 mL / OUT: 530 mL / NET: -530 mL        Physical Exam:  General: NAD  CV: RR  Lungs: breathing comfortably on RA  Abdomen: soft, appropriately tender, non-distended  Incision: trocar sites CDI with dressings in place, no strikethrough, no erythema, no drainage  Ext: no pain or swelling    Labs:      MEDICATIONS  (STANDING):  acetaminophen   Tablet .. 1000 milliGRAM(s) Oral every 6 hours  amLODIPine   Tablet 5 milliGRAM(s) Oral daily  heparin   Injectable 5000 Unit(s) SubCutaneous every 8 hours  lactated ringers. 1000 milliLiter(s) (125 mL/Hr) IV Continuous <Continuous>  sodium chloride 0.9%. 1000 milliLiter(s) (30 mL/Hr) IV Continuous <Continuous>    MEDICATIONS  (PRN):  acetaminophen   Tablet .. 1000 milliGRAM(s) Oral every 6 hours PRN Mild Pain (1 - 3)  ondansetron    Tablet 8 milliGRAM(s) Oral every 8 hours PRN Nausea and/or Vomiting  oxyCODONE    IR 5 milliGRAM(s) Oral every 4 hours PRN Moderate Pain (4 - 6)  simethicone 80 milliGRAM(s) Chew every 6 hours PRN Gas      65yoF POD#0 from RA TLH, LSO, R salpingectomy, cystoscopy, Left ureteral stent, left retrograde pyelogram (Frozen=benign) for pelvic mass. . Pt stable and doing well meeting post-op milestones.    Neuro: pain well controlled on current regimen   CV: hemodynamically stable, pt for AM CBC; continue home amlodipine  Pulm: O2 sat WNL on RA, increase ambulation, encourage incentive spirometry use  GI: ADAT; zofran for nausea; LR@125  : osborn and L UCath in place until AM; appreciate urology recommendations  Heme: HSQ and SCDs while in bed for DVT ppx  ID: afebrile, no signs of infection  Dispo: continue routine post-op care        Yoli Mcmillan MD PGY4

## 2020-11-17 NOTE — DISCHARGE NOTE PROVIDER - HOSPITAL COURSE
65y RA TLH, Left salpingooophorectomy, Right salpingectomy, cystoscopy, left ureteral stent placement and left retrograde pyelogram for a pelvic mass. Frozen section pathology revealed benign tissue.  Pts pain was well controlled with Tylenol and Oxycodone. Pt tolerating PO diet, ambulating without difficulty and has no complaints. Pt's osborn was removed on post-operative day 1. Pt was voiding spontaneously with normal vital signs. Patient is medically optimized for discharge on POD1 & instructed to follow up with Dr. Velasquez......

## 2020-11-17 NOTE — DISCHARGE NOTE PROVIDER - REASON FOR ADMISSION
RA TLH, Left salpingooophorectomy, Right salpingectomy, cystoscopy, left ureteral stent placement and left retrograde pyelogram

## 2020-11-18 ENCOUNTER — TRANSCRIPTION ENCOUNTER (OUTPATIENT)
Age: 65
End: 2020-11-18

## 2020-11-18 VITALS
RESPIRATION RATE: 17 BRPM | DIASTOLIC BLOOD PRESSURE: 77 MMHG | OXYGEN SATURATION: 99 % | SYSTOLIC BLOOD PRESSURE: 111 MMHG | TEMPERATURE: 98 F | HEART RATE: 96 BPM

## 2020-11-18 DIAGNOSIS — R19.00 INTRA-ABDOMINAL AND PELVIC SWELLING, MASS AND LUMP, UNSPECIFIED SITE: ICD-10-CM

## 2020-11-18 DIAGNOSIS — Z98.890 OTHER SPECIFIED POSTPROCEDURAL STATES: ICD-10-CM

## 2020-11-18 LAB
ANION GAP SERPL CALC-SCNC: 12 MMO/L — SIGNIFICANT CHANGE UP (ref 7–14)
BUN SERPL-MCNC: 9 MG/DL — SIGNIFICANT CHANGE UP (ref 7–23)
CALCIUM SERPL-MCNC: 9.1 MG/DL — SIGNIFICANT CHANGE UP (ref 8.4–10.5)
CHLORIDE SERPL-SCNC: 104 MMOL/L — SIGNIFICANT CHANGE UP (ref 98–107)
CO2 SERPL-SCNC: 23 MMOL/L — SIGNIFICANT CHANGE UP (ref 22–31)
CREAT SERPL-MCNC: 1 MG/DL — SIGNIFICANT CHANGE UP (ref 0.5–1.3)
GLUCOSE SERPL-MCNC: 93 MG/DL — SIGNIFICANT CHANGE UP (ref 70–99)
HCT VFR BLD CALC: 33.6 % — LOW (ref 34.5–45)
HGB BLD-MCNC: 11.1 G/DL — LOW (ref 11.5–15.5)
MAGNESIUM SERPL-MCNC: 1.8 MG/DL — SIGNIFICANT CHANGE UP (ref 1.6–2.6)
MCHC RBC-ENTMCNC: 31.8 PG — SIGNIFICANT CHANGE UP (ref 27–34)
MCHC RBC-ENTMCNC: 33 % — SIGNIFICANT CHANGE UP (ref 32–36)
MCV RBC AUTO: 96.3 FL — SIGNIFICANT CHANGE UP (ref 80–100)
NON-GYNECOLOGICAL CYTOLOGY STUDY: SIGNIFICANT CHANGE UP
NRBC # FLD: 0 K/UL — SIGNIFICANT CHANGE UP (ref 0–0)
PHOSPHATE SERPL-MCNC: 3.8 MG/DL — SIGNIFICANT CHANGE UP (ref 2.5–4.5)
PLATELET # BLD AUTO: 272 K/UL — SIGNIFICANT CHANGE UP (ref 150–400)
PMV BLD: 9.7 FL — SIGNIFICANT CHANGE UP (ref 7–13)
POTASSIUM SERPL-MCNC: 3.8 MMOL/L — SIGNIFICANT CHANGE UP (ref 3.5–5.3)
POTASSIUM SERPL-SCNC: 3.8 MMOL/L — SIGNIFICANT CHANGE UP (ref 3.5–5.3)
RBC # BLD: 3.49 M/UL — LOW (ref 3.8–5.2)
RBC # FLD: 13.8 % — SIGNIFICANT CHANGE UP (ref 10.3–14.5)
SODIUM SERPL-SCNC: 139 MMOL/L — SIGNIFICANT CHANGE UP (ref 135–145)
WBC # BLD: 15.73 K/UL — HIGH (ref 3.8–10.5)
WBC # FLD AUTO: 15.73 K/UL — HIGH (ref 3.8–10.5)

## 2020-11-18 RX ORDER — ACETAMINOPHEN 500 MG
2 TABLET ORAL
Qty: 30 | Refills: 0
Start: 2020-11-18

## 2020-11-18 RX ORDER — MAGNESIUM OXIDE 400 MG ORAL TABLET 241.3 MG
400 TABLET ORAL ONCE
Refills: 0 | Status: COMPLETED | OUTPATIENT
Start: 2020-11-18 | End: 2020-11-18

## 2020-11-18 RX ORDER — OXYCODONE HYDROCHLORIDE 5 MG/1
1 TABLET ORAL
Qty: 6 | Refills: 0
Start: 2020-11-18

## 2020-11-18 RX ORDER — SODIUM CHLORIDE 9 MG/ML
3 INJECTION INTRAMUSCULAR; INTRAVENOUS; SUBCUTANEOUS EVERY 8 HOURS
Refills: 0 | Status: DISCONTINUED | OUTPATIENT
Start: 2020-11-18 | End: 2020-11-18

## 2020-11-18 RX ADMIN — AMLODIPINE BESYLATE 5 MILLIGRAM(S): 2.5 TABLET ORAL at 05:45

## 2020-11-18 RX ADMIN — Medication 1000 MILLIGRAM(S): at 14:27

## 2020-11-18 RX ADMIN — SODIUM CHLORIDE 3 MILLILITER(S): 9 INJECTION INTRAMUSCULAR; INTRAVENOUS; SUBCUTANEOUS at 13:59

## 2020-11-18 RX ADMIN — Medication 1000 MILLIGRAM(S): at 09:35

## 2020-11-18 RX ADMIN — Medication 1000 MILLIGRAM(S): at 09:05

## 2020-11-18 RX ADMIN — Medication 1000 MILLIGRAM(S): at 03:30

## 2020-11-18 RX ADMIN — HEPARIN SODIUM 5000 UNIT(S): 5000 INJECTION INTRAVENOUS; SUBCUTANEOUS at 02:44

## 2020-11-18 RX ADMIN — Medication 1000 MILLIGRAM(S): at 02:44

## 2020-11-18 RX ADMIN — MAGNESIUM OXIDE 400 MG ORAL TABLET 400 MILLIGRAM(S): 241.3 TABLET ORAL at 09:05

## 2020-11-18 RX ADMIN — HEPARIN SODIUM 5000 UNIT(S): 5000 INJECTION INTRAVENOUS; SUBCUTANEOUS at 09:06

## 2020-11-18 NOTE — PROGRESS NOTE ADULT - PROBLEM SELECTOR PLAN 2
Fellow Note    Patient seen and examined. Agree with above.    VS reviewed  Labs reviewed    Reg diet  OOB  VTE ppx  PO analgesia  dc osborn and david today. Plan for dc home later today.    Price GARCIA

## 2020-11-18 NOTE — DISCHARGE NOTE NURSING/CASE MANAGEMENT/SOCIAL WORK - NSDCPNINST_GEN_ALL_CORE
Call MD or come to ER for fever/chills, pain not relieved with pain medicines, difficulty in breathing, redness around the incision or persistent nausea/vomiting.

## 2020-11-18 NOTE — PROGRESS NOTE ADULT - PROBLEM SELECTOR PLAN 1
Neuro: Continue PO pain medication. No NSAIDs given atrophic L kidney with hydronephrosis.  CV: Hemodynamically stable. CBC pending this am. Continue home amlodipine for HTN.  Pulm: JUSTINA. Continuous pulse oximetry. Saturating well on RA. Increase incentive spirometry.  GI: Regular diet. Encourage advancement from clears as tolerated  : Cr. 1.12. Will follow up this am. Osborn in place. UOP 1450cc from osborn catheter and 120cc from L ureteral catheter= 1570cc/12h->130.83cc/h, adequate. D/C this am pending labs.  Heme: Continue HSQ for DVT ppx. Increase OOB.    ID: Afebrile  Endo: No active issues   FEN: LR@125. Will saline lock once tolerating regular diet and voiding spontaneously. BMP, Mg, Phos pending this am.  Dispo: continue inpatient care    Olga Kim, PGY3

## 2020-11-18 NOTE — PROGRESS NOTE ADULT - SUBJECTIVE AND OBJECTIVE BOX
ANESTHESIA POSTOP CHECK    65y Female POSTOP DAY 1 S/P hysterectomy, BSO, cystoscopy.     Vital Signs Last 24 Hrs  T(C): 36.9 (18 Nov 2020 13:57), Max: 37.5 (17 Nov 2020 18:24)  T(F): 98.5 (18 Nov 2020 13:57), Max: 99.5 (17 Nov 2020 18:24)  HR: 96 (18 Nov 2020 13:57) (93 - 100)  BP: 111/77 (18 Nov 2020 13:57) (111/77 - 124/78)  BP(mean): --  RR: 17 (18 Nov 2020 13:57) (16 - 18)  SpO2: 99% (18 Nov 2020 13:57) (95% - 99%)    I&O's Summary    17 Nov 2020 07:01  -  18 Nov 2020 07:00  --------------------------------------------------------  IN: 2180 mL / OUT: 3900 mL / NET: -1720 mL    18 Nov 2020 07:01  -  18 Nov 2020 16:35  --------------------------------------------------------  IN: 1355 mL / OUT: 500 mL / NET: 855 mL        [X ] NO APPARENT ANESTHESIA COMPLICATIONS

## 2020-11-18 NOTE — PROGRESS NOTE ADULT - ASSESSMENT
Assessment/Plan: 65y w/PMH HTN, HLD, JUSTINA, and nephrolithiasis POD#1 s/p scheduled [id number], cystoscopy, left ureteral catheter placement, and L retrograde pyelogram as performed by urology (Dr. landaverde) for L pelvic mass and atrophic L kidney. Frozen=benign. Patient is stable and doing well this am.

## 2020-11-18 NOTE — DISCHARGE NOTE NURSING/CASE MANAGEMENT/SOCIAL WORK - PATIENT PORTAL LINK FT
You can access the FollowMyHealth Patient Portal offered by [facility] by registering at the following website: [email or web address]. By joining [facility]’s FollowMyHealth portal, you will also be able to view your health information using other applications (apps) compatible with our system.

## 2020-11-18 NOTE — PROGRESS NOTE ADULT - SUBJECTIVE AND OBJECTIVE BOX
PGY3 Gyn ONC Progress Note POD#1  HD#2    Subjective: Pt seen and examined at bedside. No events overnight. Pain well controlled. Patient not yet OOB or passing flatus. Patient self restricted to clears but tolerated well overnight.   Pt denies fever, chills, chest pain, SOB, nausea, vomiting, lightheadedness, dizziness.      Objective:  T(F): 99 (11-18-20 @ 05:44), Max: 99.5 (11-17-20 @ 18:24)  HR: 95 (11-18-20 @ 05:44) (72 - 100)  BP: 113/80 (11-18-20 @ 05:44) (112/79 - 132/84)  RR: 18 (11-18-20 @ 05:44) (11 - 18)  SpO2: 95% (11-18-20 @ 05:44) (94% - 100%)  Wt(kg): --  I&O's Summary    17 Nov 2020 07:01  -  18 Nov 2020 07:00  --------------------------------------------------------  IN: 2180 mL / OUT: 3900 mL / NET: -1720 mL      CAPILLARY BLOOD GLUCOSE          MEDICATIONS  (STANDING):  acetaminophen   Tablet .. 1000 milliGRAM(s) Oral every 6 hours  amLODIPine   Tablet 5 milliGRAM(s) Oral daily  heparin   Injectable 5000 Unit(s) SubCutaneous every 8 hours  influenza   Vaccine 0.5 milliLiter(s) IntraMuscular once  lactated ringers. 1000 milliLiter(s) (125 mL/Hr) IV Continuous <Continuous>  sodium chloride 0.9%. 1000 milliLiter(s) (30 mL/Hr) IV Continuous <Continuous>    MEDICATIONS  (PRN):  acetaminophen   Tablet .. 1000 milliGRAM(s) Oral every 6 hours PRN Mild Pain (1 - 3)  ondansetron    Tablet 8 milliGRAM(s) Oral every 8 hours PRN Nausea and/or Vomiting  oxyCODONE    IR 5 milliGRAM(s) Oral every 4 hours PRN Moderate Pain (4 - 6)  simethicone 80 milliGRAM(s) Chew every 6 hours PRN Gas      Physical Exam:  Constitutional: NAD, A+O x3  CV: RR S1S2 no m/r/g  Lungs: CTA b/l, good air flow b/l  Abdomen: soft, softly-distended, appropriately-tender. no guarding, no rebound, normal bowel sounds  Incision: Port sites clean, dry, intact x4  Extremities: no lower extremity edema or calf tenderness bilaterally; venodynes in place

## 2020-11-24 LAB — SURGICAL PATHOLOGY STUDY: SIGNIFICANT CHANGE UP

## 2020-12-02 ENCOUNTER — APPOINTMENT (OUTPATIENT)
Dept: GYNECOLOGIC ONCOLOGY | Facility: CLINIC | Age: 65
End: 2020-12-02
Payer: COMMERCIAL

## 2020-12-02 VITALS
HEIGHT: 62 IN | DIASTOLIC BLOOD PRESSURE: 89 MMHG | HEART RATE: 121 BPM | SYSTOLIC BLOOD PRESSURE: 145 MMHG | BODY MASS INDEX: 28.52 KG/M2 | TEMPERATURE: 98.1 F | WEIGHT: 155 LBS

## 2020-12-02 PROCEDURE — 99024 POSTOP FOLLOW-UP VISIT: CPT

## 2020-12-30 PROBLEM — N83.8 OVARIAN MASS, LEFT: Status: ACTIVE | Noted: 2020-11-09

## 2020-12-30 PROBLEM — Z48.89 POSTOPERATIVE VISIT: Status: ACTIVE | Noted: 2020-12-01

## 2020-12-30 PROBLEM — R19.00 PELVIC MASS: Status: ACTIVE | Noted: 2020-10-25

## 2020-12-30 LAB — CYTOLOGY CVX/VAG DOC THIN PREP: NORMAL

## 2021-01-04 ENCOUNTER — APPOINTMENT (OUTPATIENT)
Dept: GYNECOLOGIC ONCOLOGY | Facility: CLINIC | Age: 66
End: 2021-01-04
Payer: COMMERCIAL

## 2021-01-04 VITALS
BODY MASS INDEX: 28.71 KG/M2 | WEIGHT: 156 LBS | TEMPERATURE: 98.1 F | DIASTOLIC BLOOD PRESSURE: 87 MMHG | HEIGHT: 62 IN | HEART RATE: 99 BPM | SYSTOLIC BLOOD PRESSURE: 121 MMHG | OXYGEN SATURATION: 97 %

## 2021-01-04 DIAGNOSIS — R19.00 INTRA-ABDOMINAL AND PELVIC SWELLING, MASS AND LUMP, UNSPECIFIED SITE: ICD-10-CM

## 2021-01-04 DIAGNOSIS — N83.8 OTHER NONINFLAMMATORY DISORDERS OF OVARY, FALLOPIAN TUBE AND BROAD LIGAMENT: ICD-10-CM

## 2021-01-04 DIAGNOSIS — Z48.89 ENCOUNTER FOR OTHER SPECIFIED SURGICAL AFTERCARE: ICD-10-CM

## 2021-01-04 PROCEDURE — 99024 POSTOP FOLLOW-UP VISIT: CPT

## 2021-07-08 ENCOUNTER — FORM ENCOUNTER (OUTPATIENT)
Age: 66
End: 2021-07-08

## 2022-04-14 NOTE — H&P PST ADULT - TOBACCO USE
Former smoker Keystone Flap Text: We discussed various closure modalities with the patient, including healing by second intention, primary closure, skin graft and various flaps.  The location and configuration of the defect indicated that a Keystone flap would result in the least disturbance of the position and function of the surrounding anatomic structures, and provide the best result.  The technique, its benefits, alternatives and risks were discussed with the patient.  The patient underwent the procedure as follows: The patient was positioned supine on the operating room table.  The area of the defect and the surrounding skin were anesthetized with buffered 1% lidocaine with epinephrine.  The area was washed with chlorhexidine.  Sterile drapes were applied. \\n\\nThe defect edges were debeveled with a #15 scalpel blade.  Given the location of the defect, shape of the defect a keystone flap was deemed most appropriate.  Using a sterile surgical marker, an appropriate keystone flap was drawn incorporating the defect, outlining the appropriate donor tissue and placing the expected incisions within the relaxed skin tension lines where possible. The area thus outlined was incised deep to adipose tissue with a #15 scalpel blade.  The skin margins were undermined to an appropriate distance in all directions around the primary defect and laterally outward around the flap utilizing iris scissors.

## 2022-04-23 ENCOUNTER — INPATIENT (INPATIENT)
Facility: HOSPITAL | Age: 67
LOS: 1 days | Discharge: HOME | DRG: 203 | End: 2022-04-25
Attending: INTERNAL MEDICINE | Admitting: INTERNAL MEDICINE
Payer: COMMERCIAL

## 2022-04-23 ENCOUNTER — TRANSCRIPTION ENCOUNTER (OUTPATIENT)
Age: 67
End: 2022-04-23

## 2022-04-23 VITALS
RESPIRATION RATE: 18 BRPM | HEART RATE: 87 BPM | SYSTOLIC BLOOD PRESSURE: 131 MMHG | OXYGEN SATURATION: 96 % | WEIGHT: 207.23 LBS | TEMPERATURE: 98 F | HEIGHT: 60 IN | DIASTOLIC BLOOD PRESSURE: 89 MMHG

## 2022-04-23 DIAGNOSIS — Z98.891 HISTORY OF UTERINE SCAR FROM PREVIOUS SURGERY: Chronic | ICD-10-CM

## 2022-04-23 DIAGNOSIS — M81.0 AGE-RELATED OSTEOPOROSIS WITHOUT CURRENT PATHOLOGICAL FRACTURE: ICD-10-CM

## 2022-04-23 DIAGNOSIS — Z90.721 ACQUIRED ABSENCE OF OVARIES, UNILATERAL: Chronic | ICD-10-CM

## 2022-04-23 DIAGNOSIS — Z29.9 ENCOUNTER FOR PROPHYLACTIC MEASURES, UNSPECIFIED: ICD-10-CM

## 2022-04-23 DIAGNOSIS — J45.901 UNSPECIFIED ASTHMA WITH (ACUTE) EXACERBATION: ICD-10-CM

## 2022-04-23 DIAGNOSIS — E78.5 HYPERLIPIDEMIA, UNSPECIFIED: ICD-10-CM

## 2022-04-23 DIAGNOSIS — I10 ESSENTIAL (PRIMARY) HYPERTENSION: ICD-10-CM

## 2022-04-23 DIAGNOSIS — Z98.890 OTHER SPECIFIED POSTPROCEDURAL STATES: Chronic | ICD-10-CM

## 2022-04-23 LAB
ALBUMIN SERPL ELPH-MCNC: 3.5 G/DL — SIGNIFICANT CHANGE UP (ref 3.5–5)
ALP SERPL-CCNC: 90 U/L — SIGNIFICANT CHANGE UP (ref 40–120)
ALT FLD-CCNC: 24 U/L DA — SIGNIFICANT CHANGE UP (ref 10–60)
ANION GAP SERPL CALC-SCNC: 7 MMOL/L — SIGNIFICANT CHANGE UP (ref 5–17)
AST SERPL-CCNC: 15 U/L — SIGNIFICANT CHANGE UP (ref 10–40)
BASOPHILS # BLD AUTO: 0.07 K/UL — SIGNIFICANT CHANGE UP (ref 0–0.2)
BASOPHILS NFR BLD AUTO: 0.7 % — SIGNIFICANT CHANGE UP (ref 0–2)
BILIRUB SERPL-MCNC: 0.5 MG/DL — SIGNIFICANT CHANGE UP (ref 0.2–1.2)
BUN SERPL-MCNC: 14 MG/DL — SIGNIFICANT CHANGE UP (ref 7–18)
CALCIUM SERPL-MCNC: 9.6 MG/DL — SIGNIFICANT CHANGE UP (ref 8.4–10.5)
CHLORIDE SERPL-SCNC: 107 MMOL/L — SIGNIFICANT CHANGE UP (ref 96–108)
CO2 SERPL-SCNC: 27 MMOL/L — SIGNIFICANT CHANGE UP (ref 22–31)
CREAT SERPL-MCNC: 1.04 MG/DL — SIGNIFICANT CHANGE UP (ref 0.5–1.3)
EGFR: 59 ML/MIN/1.73M2 — LOW
EOSINOPHIL # BLD AUTO: 1.52 K/UL — HIGH (ref 0–0.5)
EOSINOPHIL NFR BLD AUTO: 14.2 % — HIGH (ref 0–6)
GLUCOSE SERPL-MCNC: 91 MG/DL — SIGNIFICANT CHANGE UP (ref 70–99)
HCT VFR BLD CALC: 41.4 % — SIGNIFICANT CHANGE UP (ref 34.5–45)
HGB BLD-MCNC: 14.1 G/DL — SIGNIFICANT CHANGE UP (ref 11.5–15.5)
HIV 1 & 2 AB SERPL IA.RAPID: SIGNIFICANT CHANGE UP
IMM GRANULOCYTES NFR BLD AUTO: 0.2 % — SIGNIFICANT CHANGE UP (ref 0–1.5)
LACTATE SERPL-SCNC: 1 MMOL/L — SIGNIFICANT CHANGE UP (ref 0.7–2)
LYMPHOCYTES # BLD AUTO: 2.82 K/UL — SIGNIFICANT CHANGE UP (ref 1–3.3)
LYMPHOCYTES # BLD AUTO: 26.3 % — SIGNIFICANT CHANGE UP (ref 13–44)
MCHC RBC-ENTMCNC: 32.3 PG — SIGNIFICANT CHANGE UP (ref 27–34)
MCHC RBC-ENTMCNC: 34.1 GM/DL — SIGNIFICANT CHANGE UP (ref 32–36)
MCV RBC AUTO: 95 FL — SIGNIFICANT CHANGE UP (ref 80–100)
MONOCYTES # BLD AUTO: 0.83 K/UL — SIGNIFICANT CHANGE UP (ref 0–0.9)
MONOCYTES NFR BLD AUTO: 7.7 % — SIGNIFICANT CHANGE UP (ref 2–14)
NEUTROPHILS # BLD AUTO: 5.48 K/UL — SIGNIFICANT CHANGE UP (ref 1.8–7.4)
NEUTROPHILS NFR BLD AUTO: 50.9 % — SIGNIFICANT CHANGE UP (ref 43–77)
NRBC # BLD: 0 /100 WBCS — SIGNIFICANT CHANGE UP (ref 0–0)
NT-PROBNP SERPL-SCNC: 81 PG/ML — SIGNIFICANT CHANGE UP (ref 0–125)
PLATELET # BLD AUTO: 237 K/UL — SIGNIFICANT CHANGE UP (ref 150–400)
POTASSIUM SERPL-MCNC: 3.9 MMOL/L — SIGNIFICANT CHANGE UP (ref 3.5–5.3)
POTASSIUM SERPL-SCNC: 3.9 MMOL/L — SIGNIFICANT CHANGE UP (ref 3.5–5.3)
PROT SERPL-MCNC: 7.8 G/DL — SIGNIFICANT CHANGE UP (ref 6–8.3)
RAPID RVP RESULT: SIGNIFICANT CHANGE UP
RBC # BLD: 4.36 M/UL — SIGNIFICANT CHANGE UP (ref 3.8–5.2)
RBC # FLD: 12 % — SIGNIFICANT CHANGE UP (ref 10.3–14.5)
SARS-COV-2 RNA SPEC QL NAA+PROBE: SIGNIFICANT CHANGE UP
SODIUM SERPL-SCNC: 141 MMOL/L — SIGNIFICANT CHANGE UP (ref 135–145)
TROPONIN I, HIGH SENSITIVITY RESULT: 3.4 NG/L — SIGNIFICANT CHANGE UP
WBC # BLD: 10.74 K/UL — HIGH (ref 3.8–10.5)
WBC # FLD AUTO: 10.74 K/UL — HIGH (ref 3.8–10.5)

## 2022-04-23 PROCEDURE — 99285 EMERGENCY DEPT VISIT HI MDM: CPT

## 2022-04-23 PROCEDURE — 93010 ELECTROCARDIOGRAM REPORT: CPT

## 2022-04-23 PROCEDURE — 71045 X-RAY EXAM CHEST 1 VIEW: CPT | Mod: 26

## 2022-04-23 RX ORDER — ENOXAPARIN SODIUM 100 MG/ML
40 INJECTION SUBCUTANEOUS EVERY 24 HOURS
Refills: 0 | Status: DISCONTINUED | OUTPATIENT
Start: 2022-04-23 | End: 2022-04-25

## 2022-04-23 RX ORDER — CEFTRIAXONE 500 MG/1
1000 INJECTION, POWDER, FOR SOLUTION INTRAMUSCULAR; INTRAVENOUS ONCE
Refills: 0 | Status: COMPLETED | OUTPATIENT
Start: 2022-04-23 | End: 2022-04-23

## 2022-04-23 RX ORDER — BUDESONIDE AND FORMOTEROL FUMARATE DIHYDRATE 160; 4.5 UG/1; UG/1
2 AEROSOL RESPIRATORY (INHALATION)
Refills: 0 | Status: DISCONTINUED | OUTPATIENT
Start: 2022-04-23 | End: 2022-04-25

## 2022-04-23 RX ORDER — SIMVASTATIN 20 MG/1
1 TABLET, FILM COATED ORAL
Qty: 0 | Refills: 0 | DISCHARGE

## 2022-04-23 RX ORDER — AZITHROMYCIN 500 MG/1
500 TABLET, FILM COATED ORAL ONCE
Refills: 0 | Status: COMPLETED | OUTPATIENT
Start: 2022-04-23 | End: 2022-04-23

## 2022-04-23 RX ORDER — ACETAMINOPHEN 500 MG
650 TABLET ORAL EVERY 6 HOURS
Refills: 0 | Status: DISCONTINUED | OUTPATIENT
Start: 2022-04-23 | End: 2022-04-25

## 2022-04-23 RX ORDER — AMLODIPINE BESYLATE 2.5 MG/1
5 TABLET ORAL DAILY
Refills: 0 | Status: DISCONTINUED | OUTPATIENT
Start: 2022-04-23 | End: 2022-04-25

## 2022-04-23 RX ORDER — ALBUTEROL 90 UG/1
2 AEROSOL, METERED ORAL EVERY 6 HOURS
Refills: 0 | Status: DISCONTINUED | OUTPATIENT
Start: 2022-04-23 | End: 2022-04-25

## 2022-04-23 RX ORDER — FLUTICASONE PROPIONATE AND SALMETEROL 50; 250 UG/1; UG/1
1 POWDER ORAL; RESPIRATORY (INHALATION)
Qty: 0 | Refills: 0 | DISCHARGE

## 2022-04-23 RX ORDER — IPRATROPIUM/ALBUTEROL SULFATE 18-103MCG
3 AEROSOL WITH ADAPTER (GRAM) INHALATION
Refills: 0 | Status: COMPLETED | OUTPATIENT
Start: 2022-04-23 | End: 2022-04-23

## 2022-04-23 RX ORDER — AMLODIPINE BESYLATE 2.5 MG/1
1 TABLET ORAL
Qty: 0 | Refills: 0 | DISCHARGE

## 2022-04-23 RX ORDER — ALENDRONATE SODIUM 70 MG/1
1 TABLET ORAL
Qty: 0 | Refills: 0 | DISCHARGE

## 2022-04-23 RX ORDER — SIMVASTATIN 20 MG/1
40 TABLET, FILM COATED ORAL AT BEDTIME
Refills: 0 | Status: DISCONTINUED | OUTPATIENT
Start: 2022-04-23 | End: 2022-04-25

## 2022-04-23 RX ADMIN — AZITHROMYCIN 255 MILLIGRAM(S): 500 TABLET, FILM COATED ORAL at 12:57

## 2022-04-23 RX ADMIN — Medication 3 MILLILITER(S): at 13:13

## 2022-04-23 RX ADMIN — Medication 650 MILLIGRAM(S): at 22:17

## 2022-04-23 RX ADMIN — CEFTRIAXONE 100 MILLIGRAM(S): 500 INJECTION, POWDER, FOR SOLUTION INTRAMUSCULAR; INTRAVENOUS at 12:57

## 2022-04-23 RX ADMIN — Medication 650 MILLIGRAM(S): at 23:00

## 2022-04-23 RX ADMIN — ALBUTEROL 2 PUFF(S): 90 AEROSOL, METERED ORAL at 22:15

## 2022-04-23 RX ADMIN — Medication 3 MILLILITER(S): at 13:27

## 2022-04-23 RX ADMIN — BUDESONIDE AND FORMOTEROL FUMARATE DIHYDRATE 2 PUFF(S): 160; 4.5 AEROSOL RESPIRATORY (INHALATION) at 22:16

## 2022-04-23 RX ADMIN — Medication 125 MILLIGRAM(S): at 12:57

## 2022-04-23 RX ADMIN — Medication 40 MILLIGRAM(S): at 22:16

## 2022-04-23 RX ADMIN — CEFTRIAXONE 1000 MILLIGRAM(S): 500 INJECTION, POWDER, FOR SOLUTION INTRAMUSCULAR; INTRAVENOUS at 13:12

## 2022-04-23 RX ADMIN — Medication 3 MILLILITER(S): at 12:56

## 2022-04-23 RX ADMIN — SIMVASTATIN 40 MILLIGRAM(S): 20 TABLET, FILM COATED ORAL at 22:16

## 2022-04-23 RX ADMIN — AZITHROMYCIN 500 MILLIGRAM(S): 500 TABLET, FILM COATED ORAL at 13:29

## 2022-04-23 NOTE — H&P ADULT - ATTENDING COMMENTS
Seen and examined earlier . I feel better . Continue IV Steroid and Inhalers as well home meds. Pulmonary consulted by me.

## 2022-04-23 NOTE — H&P ADULT - HISTORY OF PRESENT ILLNESS
67F from home, independent at baseline, Sami speaking, with PMH Asthma, HTN, HLD, Osteoporosis on alendronate p/w sob x3 days at rest and worsened by exertion. Pt notes that she has been treated with prednisone by her pcp last month for asthma which helped her breathing. She intermittently had some cough with white sputum production in the interim but does not really bother her breathing. She does not have a pulmonologist. Pt denies chest pain, fever, chills, nausea, vomiting ,diarrhea, constipation. Pt also reports near resolution of her symptoms since getting 2 duonebs and solumedrol 125x1 in ed.

## 2022-04-23 NOTE — ED ADULT NURSE NOTE - ED STAT RN HANDOFF DETAILS
Admission to [address] [place] [address] pending, awaiting return call from Margot RN for pt handoff.

## 2022-04-23 NOTE — DISCHARGE NOTE PROVIDER - CARE PROVIDER_API CALL
Heath Segura)  Internal Medicine  [address] [facility], [address]  [place] [address]  Phone: [phone]  Fax: [phone]  Follow Up Time: 1 week    DONALD SOUSA  Internal Medicine  [address] [facility] [address]  [place] [address]  Phone: [phone]  Fax: [phone]  Follow Up Time: 1 week

## 2022-04-23 NOTE — H&P ADULT - NSHPPHYSICALEXAM_GEN_ALL_CORE
General - NAD, sitting up in bed, well groomed  Eyes - PERRLA, EOM intact  ENT - Nonicteric sclerae, PERRLA, EOMI. Oropharynx clear. Moist mucous membranes. Conjunctivae appear well perfused.   Neck - No noticeable or palpable swelling, redness or rash around throat or on face  Lymph Nodes - No lymphadenopathy  Cardiovascular - RRR no m/r/g, no JVD, no carotid bruits  Lungs -  no use of accessory muscles, no crackles (+) wheezing diffusely  Skin - No rashes, skin warm and dry, no erythematous areas  Abdomen - Normal bowel sounds, abdomen soft and nontender  Rectal – Rectal exam not performed since no symptoms indicated blood loss.  Extremities - No edema, cyanosis or clubbing  Musculoskeletal - 5/5 strength, normal range of motion, no swollen or erythematous joints.  Neuro– Alert and oriented x 3, CN 2-12 grossly intact.

## 2022-04-23 NOTE — H&P ADULT - ASSESSMENT
67F from home, independent at baseline, Slovak speaking, with PMH Asthma, HTN, HLD, Osteoporosis on alendronate p/w sob x3 days admitted for asthma exacerbation

## 2022-04-23 NOTE — H&P ADULT - NSHPSOCIALHISTORY_GEN_ALL_CORE
Previous smoker, quit 25 yrs prior, smoked 3-4cigarettes per day x 10yrs  Denies alcohol intake, and illicit drug use

## 2022-04-23 NOTE — DISCHARGE NOTE PROVIDER - HOSPITAL COURSE
Pt is a 66 yo F, from home, independent at baseline, Palestinian speaking, with PMH Asthma, HTN, HLD, Osteoporosis on alendronate,  p/w SOB x 3 days & with diffuse wheezing. CXR negative.   Admitted for asthma exacerbation. s/p Duoneb x2 and solumedrol 125x1 in ED. Pulmonary consulted.         >>>>>>>>>>>>>>>>>INCOMPLETE>>>>>>>>>>>>>>>>> Pt is a 68 yo F, from home, independent at baseline, American speaking, with PMH Asthma, HTN, HLD, Osteoporosis on alendronate,  p/w SOB x 3 days & with diffuse wheezing. CXR negative.   Admitted for asthma exacerbation. s/p Duoneb x2 and solumedrol 125x1 in ED. transitioned to prednisone po taper. Pulmonary following. Pt is medically optimized and clinically improved for discharge.   Please note that this a brief summary of hospital course please refer to daily progress notes and consult notes for full course and events

## 2022-04-23 NOTE — PATIENT PROFILE ADULT - TRANSPORTATION
Patient discharge reviewed; patient verbalized understanding of plan care. Elizabeth Woodruff MA    no

## 2022-04-23 NOTE — H&P ADULT - PROBLEM SELECTOR PLAN 1
p/w sob with diffuse wheezing s/p duoneb x2 and solumedrol 125x1 in ed  CXR negative  solumedrol 40q8, taper down in am   symbicort + albuterol prn  may need HFA prescribed on discharged as patient does not like diskus inhaler  Peak flow meter  Pulm consulted__________ p/w sob with diffuse wheezing s/p duoneb x2 and solumedrol 125x1 in ed  CXR negative  solumedrol 40q8, taper down in am   symbicort + albuterol prn  may need HFA prescribed on discharged as patient does not like diskus inhaler  Peak flow meter  Pulm consulted Dr Segura

## 2022-04-23 NOTE — ED ADULT NURSE NOTE - OBJECTIVE STATEMENT
Patient presents with c/o chest congestion for past 2 weeks, cough, SOB for the past  4 days, denies fever/chills, cheat pain, back pain.

## 2022-04-23 NOTE — DISCHARGE NOTE PROVIDER - NSDCCPCAREPLAN_GEN_ALL_CORE_FT
PRINCIPAL DISCHARGE DIAGNOSIS  Diagnosis: Acute asthma exacerbation  Assessment and Plan of Treatment: YOu presented with shortness of breath likley due to asthma exacerberation. You were seen by pulmonologist was given Intravenous steriods. It was transitioned to predinisone by mouth once you improved. Susie afollow up with your PCP and pulmonologist   SEEK IMMEDIATE MEDICAL CARE IF:  You are short of breath even at rest.  You get short of breath when doing very little physical activity.  You have difficulty eating, drinking, or talking due to asthma symptoms.  You have chest pain or you feel that your heart is beating fast.   You have a bluish color to your lips or fingernails.  You are lightheaded, dizzy, or faint.  You have a fever or persistent symptoms for more than 2–3 days.   You have a fever and symptoms suddenly get worse.  You seem to be getting worse and are unresponsive to treatment during an asthma attack.   Your peak flow is less than 50% of personal best.        SECONDARY DISCHARGE DIAGNOSES  Diagnosis: HTN (hypertension)  Assessment and Plan of Treatment: Follow Low salt diet  Activity as tolerated.  Take all medication as prescribed.  Follow up with your medical doctor for routine blood pressure monitoring at your next visit.  Notify your doctor if you have any of the following symptoms:   Dizziness, Lightheadedness, Blurry vision, Headache, Chest pain, Shortness of breath

## 2022-04-23 NOTE — ED PROVIDER NOTE - OBJECTIVE STATEMENT
67 y.o. female c/o chest congestion for past 2 weeks, coughing up whitish sputum for past 3 weeks, sob for 4 days, wheezing, no fever, n/v, pt's vaccianted for COVID.  Pt used inhaler with no relief, no nasal congestion

## 2022-04-23 NOTE — ED PROVIDER NOTE - CARE PLAN
Principal Discharge DX:	Acute asthma exacerbation  Secondary Diagnosis:	Acute asthmatic bronchitis   1

## 2022-04-23 NOTE — DISCHARGE NOTE PROVIDER - NSDCMRMEDTOKEN_GEN_ALL_CORE_FT
Advair Diskus 250 mcg-50 mcg inhalation powder: 1 puff(s) inhaled 2 times a day  alendronate 70 mg oral tablet: 1 tab(s) orally once a week  amLODIPine 5 mg oral tablet: 1 tab(s) orally once a day  simvastatin 40 mg oral tablet: 1 tab(s) orally once a day (at bedtime)   Advair Diskus 250 mcg-50 mcg inhalation powder: 1 puff(s) inhaled 2 times a day  albuterol 90 mcg/inh inhalation aerosol: 2 puff(s) inhaled every 6 hours  alendronate 70 mg oral tablet: 1 tab(s) orally once a week  amLODIPine 5 mg oral tablet: 1 tab(s) orally once a day  budesonide-formoterol 160 mcg-4.5 mcg/inh inhalation aerosol: 2 puff(s) inhaled 2 times a day   predniSONE 10 mg oral tablet: 2 tab(s) orally once a day x 7 days  1 tab(s) orally once a day x 7 days  simvastatin 40 mg oral tablet: 1 tab(s) orally once a day (at bedtime)

## 2022-04-23 NOTE — DISCHARGE NOTE PROVIDER - PROVIDER TOKENS
PROVIDER:[TOKEN:[[id number]],FOLLOWUP:[1 week]],PROVIDER:[TOKEN:[[id number]],FOLLOWUP:[1 week]]

## 2022-04-23 NOTE — PATIENT PROFILE ADULT - FALL HARM RISK - UNIVERSAL INTERVENTIONS
Bed in lowest position, wheels locked, appropriate side rails in place/Call bell, personal items and telephone in reach/Instruct patient to call for assistance before getting out of bed or chair/Non-slip footwear when patient is out of bed/[place] to call system/Physically safe environment - no spills, clutter or unnecessary equipment/Purposeful Proactive Rounding/Room/bathroom lighting operational, light cord in reach

## 2022-04-23 NOTE — ED PROVIDER NOTE - PROGRESS NOTE DETAILS
Pt's unable to perform PF Pt claims she is feeling better.  Lungs- diffuse rhonchi/wheezing, unable to perform PF. Pt with asthma exac., Case d/w Dr. Alejandre, will admit

## 2022-04-24 LAB
ANION GAP SERPL CALC-SCNC: 7 MMOL/L — SIGNIFICANT CHANGE UP (ref 5–17)
BASOPHILS # BLD AUTO: 0.01 K/UL — SIGNIFICANT CHANGE UP (ref 0–0.2)
BASOPHILS NFR BLD AUTO: 0.1 % — SIGNIFICANT CHANGE UP (ref 0–2)
BUN SERPL-MCNC: 23 MG/DL — HIGH (ref 7–18)
CALCIUM SERPL-MCNC: 9.5 MG/DL — SIGNIFICANT CHANGE UP (ref 8.4–10.5)
CHLORIDE SERPL-SCNC: 106 MMOL/L — SIGNIFICANT CHANGE UP (ref 96–108)
CO2 SERPL-SCNC: 24 MMOL/L — SIGNIFICANT CHANGE UP (ref 22–31)
CREAT SERPL-MCNC: 1.19 MG/DL — SIGNIFICANT CHANGE UP (ref 0.5–1.3)
EGFR: 50 ML/MIN/1.73M2 — LOW
EOSINOPHIL # BLD AUTO: 0 K/UL — SIGNIFICANT CHANGE UP (ref 0–0.5)
EOSINOPHIL NFR BLD AUTO: 0 % — SIGNIFICANT CHANGE UP (ref 0–6)
GLUCOSE SERPL-MCNC: 140 MG/DL — HIGH (ref 70–99)
HCT VFR BLD CALC: 40.3 % — SIGNIFICANT CHANGE UP (ref 34.5–45)
HGB BLD-MCNC: 13.7 G/DL — SIGNIFICANT CHANGE UP (ref 11.5–15.5)
IMM GRANULOCYTES NFR BLD AUTO: 0.7 % — SIGNIFICANT CHANGE UP (ref 0–1.5)
LYMPHOCYTES # BLD AUTO: 1.55 K/UL — SIGNIFICANT CHANGE UP (ref 1–3.3)
LYMPHOCYTES # BLD AUTO: 9.1 % — LOW (ref 13–44)
MAGNESIUM SERPL-MCNC: 2.2 MG/DL — SIGNIFICANT CHANGE UP (ref 1.6–2.6)
MCHC RBC-ENTMCNC: 32.5 PG — SIGNIFICANT CHANGE UP (ref 27–34)
MCHC RBC-ENTMCNC: 34 GM/DL — SIGNIFICANT CHANGE UP (ref 32–36)
MCV RBC AUTO: 95.7 FL — SIGNIFICANT CHANGE UP (ref 80–100)
MONOCYTES # BLD AUTO: 0.48 K/UL — SIGNIFICANT CHANGE UP (ref 0–0.9)
MONOCYTES NFR BLD AUTO: 2.8 % — SIGNIFICANT CHANGE UP (ref 2–14)
NEUTROPHILS # BLD AUTO: 14.86 K/UL — HIGH (ref 1.8–7.4)
NEUTROPHILS NFR BLD AUTO: 87.3 % — HIGH (ref 43–77)
NRBC # BLD: 0 /100 WBCS — SIGNIFICANT CHANGE UP (ref 0–0)
PHOSPHATE SERPL-MCNC: 4.1 MG/DL — SIGNIFICANT CHANGE UP (ref 2.5–4.5)
PLATELET # BLD AUTO: 229 K/UL — SIGNIFICANT CHANGE UP (ref 150–400)
POTASSIUM SERPL-MCNC: 4.3 MMOL/L — SIGNIFICANT CHANGE UP (ref 3.5–5.3)
POTASSIUM SERPL-SCNC: 4.3 MMOL/L — SIGNIFICANT CHANGE UP (ref 3.5–5.3)
RBC # BLD: 4.21 M/UL — SIGNIFICANT CHANGE UP (ref 3.8–5.2)
RBC # FLD: 12.1 % — SIGNIFICANT CHANGE UP (ref 10.3–14.5)
SODIUM SERPL-SCNC: 137 MMOL/L — SIGNIFICANT CHANGE UP (ref 135–145)
WBC # BLD: 17.02 K/UL — HIGH (ref 3.8–10.5)
WBC # FLD AUTO: 17.02 K/UL — HIGH (ref 3.8–10.5)

## 2022-04-24 RX ADMIN — ENOXAPARIN SODIUM 40 MILLIGRAM(S): 100 INJECTION SUBCUTANEOUS at 05:58

## 2022-04-24 RX ADMIN — Medication 20 MILLIGRAM(S): at 21:34

## 2022-04-24 RX ADMIN — Medication 650 MILLIGRAM(S): at 12:00

## 2022-04-24 RX ADMIN — ALBUTEROL 2 PUFF(S): 90 AEROSOL, METERED ORAL at 16:08

## 2022-04-24 RX ADMIN — BUDESONIDE AND FORMOTEROL FUMARATE DIHYDRATE 2 PUFF(S): 160; 4.5 AEROSOL RESPIRATORY (INHALATION) at 21:34

## 2022-04-24 RX ADMIN — Medication 650 MILLIGRAM(S): at 05:16

## 2022-04-24 RX ADMIN — ALBUTEROL 2 PUFF(S): 90 AEROSOL, METERED ORAL at 04:19

## 2022-04-24 RX ADMIN — ALBUTEROL 2 PUFF(S): 90 AEROSOL, METERED ORAL at 10:01

## 2022-04-24 RX ADMIN — ALBUTEROL 2 PUFF(S): 90 AEROSOL, METERED ORAL at 21:34

## 2022-04-24 RX ADMIN — Medication 650 MILLIGRAM(S): at 11:10

## 2022-04-24 RX ADMIN — SIMVASTATIN 40 MILLIGRAM(S): 20 TABLET, FILM COATED ORAL at 21:34

## 2022-04-24 RX ADMIN — Medication 40 MILLIGRAM(S): at 05:58

## 2022-04-24 RX ADMIN — Medication 20 MILLIGRAM(S): at 13:42

## 2022-04-24 RX ADMIN — Medication 650 MILLIGRAM(S): at 04:18

## 2022-04-24 RX ADMIN — AMLODIPINE BESYLATE 5 MILLIGRAM(S): 2.5 TABLET ORAL at 05:58

## 2022-04-24 RX ADMIN — BUDESONIDE AND FORMOTEROL FUMARATE DIHYDRATE 2 PUFF(S): 160; 4.5 AEROSOL RESPIRATORY (INHALATION) at 10:01

## 2022-04-24 NOTE — CONSULT NOTE ADULT - SUBJECTIVE AND OBJECTIVE BOX
Time of visit:    CHIEF COMPLAINT: Patient is a 67y old  Female who presents with a chief complaint of Asthma Exacerbation (2022 09:27)      HPI:  67F from home, independent at baseline, Syriac speaking, with PMH Asthma, HTN, HLD, Osteoporosis on alendronate p/w sob x3 days at rest and worsened by exertion. Pt notes that she has been treated with prednisone by her pcp last month for asthma which helped her breathing. She intermittently had some cough with white sputum production in the interim but does not really bother her breathing. She does not have a pulmonologist. Pt denies chest pain, fever, chills, nausea, vomiting ,diarrhea, constipation. Pt also reports near resolution of her symptoms since getting 2 duonebs and solumedrol 125x1 in ed.  (2022 17:32)   Patient seen and examined.     PAST MEDICAL & SURGICAL HISTORY:  HTN (hypertension)    HLD (hyperlipidemia)    Renal stone    Calculus of kidney with calculus of ureter    Pancolitis  treated in 10/2020    Intra-abdominal and pelvic swelling of mass or lump    Asthma    JUSTINA (obstructive sleep apnea)  severe, does not use an inhaler    Anemia    History of cystoscopy    History of  section    H/O unilateral oophorectomy    History of cystoscopy  bilateral ureteral stent placement, lithotripsy 2020  ureteral stents removed 10/2020        Allergies    No Known Allergies    Intolerances        MEDICATIONS  (STANDING):  ALBUTerol    90 MICROgram(s) HFA Inhaler 2 Puff(s) Inhalation every 6 hours  amLODIPine   Tablet 5 milliGRAM(s) Oral daily  budesonide 160 MICROgram(s)/formoterol 4.5 MICROgram(s) Inhaler 2 Puff(s) Inhalation two times a day  enoxaparin Injectable 40 milliGRAM(s) SubCutaneous every 24 hours  methylPREDNISolone sodium succinate Injectable 20 milliGRAM(s) IV Push three times a day  simvastatin 40 milliGRAM(s) Oral at bedtime      MEDICATIONS  (PRN):  acetaminophen     Tablet .. 650 milliGRAM(s) Oral every 6 hours PRN Temp greater or equal to 38C (100.4F), Moderate Pain (4 - 6)   Medications up to date at time of exam.    Medications up to date at time of exam.    FAMILY HISTORY:      SOCIAL HISTORY  Smoking History: [   ] smoking/smoke exposure, [   ] former smoker  Living Condition: [   ] apartment, [   ] private house  Work History:   Travel History: denies recent travel  Illicit Substance Use: denies  Alcohol Use: denies    REVIEW OF SYSTEMS:    CONSTITUTIONAL:  denies fevers, chills, sweats, weight loss    HEENT:  denies diplopia or blurred vision, sore throat or runny nose.    CARDIOVASCULAR:  denies pressure, squeezing, tightness, or heaviness about the chest; no palpitations.    RESPIRATORY:  denies SOB, cough, SEE, wheezing.    GASTROINTESTINAL:  denies abdominal pain, nausea, vomiting or diarrhea.    GENITOURINARY: denies dysuria, frequency or urgency.    NEUROLOGIC:  denies numbness, tingling, seizures or weakness.    PSYCHIATRIC:  denies disorder of thought or mood.    MSK: denies swelling, redness      PHYSICAL EXAMINATION:    GENERAL: The patient is a well-developed, well-nourished, in no apparent distress.     Vital Signs Last 24 Hrs  T(C): 36.8 (2022 12:27), Max: 36.9 (2022 22:05)  T(F): 98.3 (2022 12:27), Max: 98.4 (2022 22:05)  HR: 101 (2022 12:27) (90 - 101)  BP: 116/66 (2022 12:27) (115/75 - 122/83)  BP(mean): 85 (2022 22:05) (84 - 85)  RR: 19 (2022 12:27) (17 - 19)  SpO2: 98% (2022 12:27) (98% - 99%)   (if applicable)    Chest Tube (if applicable)    HEENT: Head is normocephalic and atraumatic. Extraocular muscles are intact. Mucous membranes are moist.     NECK: Supple, no palpable adenopathy.    LUNGS: Clear to auscultation, no wheezing, rales, or rhonchi.    HEART: Regular rate and rhythm without murmur.    ABDOMEN: Soft, nontender, and nondistended.  No hepatosplenomegaly is noted.    RENAL: No difficulty voiding, no pelvic pain    EXTREMITIES: Without any cyanosis, clubbing, rash, lesions or edema.    NEUROLOGIC: Awake, alert, oriented, grossly intact    SKIN: Warm, dry, good turgor.      LABS:                        13.7   17.02 )-----------( 229      ( 2022 06:41 )             40.3     04-24    137  |  106  |  23<H>  ----------------------------<  140<H>  4.3   |  24  |  1.19    Ca    9.5      2022 06:41  Phos  4.1     -  Mg     2.2     -    TPro  7.8  /  Alb  3.5  /  TBili  0.5  /  DBili  x   /  AST  15  /  ALT  24  /  AlkPhos  90                  Serum Pro-Brain Natriuretic Peptide: 81 pg/mL (22 @ 12:59)          MICROBIOLOGY: (if applicable)    RADIOLOGY & ADDITIONAL STUDIES:  EKG:   CXR:  ECHO:    IMPRESSION: 67y Female PAST MEDICAL & SURGICAL HISTORY:  HTN (hypertension)    HLD (hyperlipidemia)    Renal stone    Calculus of kidney with calculus of ureter    Pancolitis  treated in 10/2020    Intra-abdominal and pelvic swelling of mass or lump    Asthma    JUSTINA (obstructive sleep apnea)  severe, does not use an inhaler    Anemia    History of cystoscopy    History of  section    H/O unilateral oophorectomy    History of cystoscopy  bilateral ureteral stent placement, lithotripsy 2020  ureteral stents removed 10/2020     p/w                   RECOMMENDATIONS:   Time of visit:    CHIEF COMPLAINT: Patient is a 67y old  Female who presents with a chief complaint of Asthma Exacerbation (2022 09:27)      HPI:  67F from home, independent at baseline, Guinean speaking, with PMH Asthma, HTN, HLD, Osteoporosis on alendronate p/w sob x3 days at rest and worsened by exertion. Pt notes that she has been treated with prednisone by her pcp last month for asthma which helped her breathing. She intermittently had some cough with white sputum production in the interim but does not really bother her breathing. She does not have a pulmonologist. Pt denies chest pain, fever, chills, nausea, vomiting ,diarrhea, constipation. Pt also reports near resolution of her symptoms since getting 2 duonebs and solumedrol 125x1 in ed.  (2022 17:32)   Patient seen and examined.     PAST MEDICAL & SURGICAL HISTORY:  HTN (hypertension)    HLD (hyperlipidemia)    Renal stone    Calculus of kidney with calculus of ureter    Pancolitis  treated in 10/2020    Intra-abdominal and pelvic swelling of mass or lump    Asthma    JUSTINA (obstructive sleep apnea)  severe, does not use an inhaler    Anemia    History of cystoscopy    History of  section    H/O unilateral oophorectomy    History of cystoscopy  bilateral ureteral stent placement, lithotripsy 2020  ureteral stents removed 10/2020        Allergies    No Known Allergies    Intolerances        MEDICATIONS  (STANDING):  ALBUTerol    90 MICROgram(s) HFA Inhaler 2 Puff(s) Inhalation every 6 hours  amLODIPine   Tablet 5 milliGRAM(s) Oral daily  budesonide 160 MICROgram(s)/formoterol 4.5 MICROgram(s) Inhaler 2 Puff(s) Inhalation two times a day  enoxaparin Injectable 40 milliGRAM(s) SubCutaneous every 24 hours  methylPREDNISolone sodium succinate Injectable 20 milliGRAM(s) IV Push three times a day  simvastatin 40 milliGRAM(s) Oral at bedtime      MEDICATIONS  (PRN):  acetaminophen     Tablet .. 650 milliGRAM(s) Oral every 6 hours PRN Temp greater or equal to 38C (100.4F), Moderate Pain (4 - 6)   Medications up to date at time of exam.    Medications up to date at time of exam.    FAMILY HISTORY:      SOCIAL HISTORY  Smoking History: [   ] smoking/smoke exposure, [ x  ] former smoker  Living Condition: [   ] apartment, [   ] private house  Work History:   Travel History: denies recent travel  Illicit Substance Use: denies  Alcohol Use: denies    REVIEW OF SYSTEMS:    CONSTITUTIONAL:  denies fevers, chills, sweats, weight loss    HEENT:  denies diplopia or blurred vision, sore throat or runny nose.    CARDIOVASCULAR:  denies pressure, squeezing, tightness, or heaviness about the chest; no palpitations.    RESPIRATORY:  denies SOB, cough, SEE, wheezing.    GASTROINTESTINAL:  denies abdominal pain, nausea, vomiting or diarrhea.    GENITOURINARY: denies dysuria, frequency or urgency.    NEUROLOGIC:  denies numbness, tingling, seizures or weakness.    PSYCHIATRIC:  denies disorder of thought or mood.    MSK: denies swelling, redness      PHYSICAL EXAMINATION:    GENERAL: The patient is a well-developed, well-nourished, in no apparent distress.     Vital Signs Last 24 Hrs  T(C): 36.8 (2022 12:27), Max: 36.9 (2022 22:05)  T(F): 98.3 (2022 12:27), Max: 98.4 (2022 22:05)  HR: 101 (2022 12:27) (90 - 101)  BP: 116/66 (2022 12:27) (115/75 - 122/83)  BP(mean): 85 (2022 22:05) (84 - 85)  RR: 19 (2022 12:27) (17 - 19)  SpO2: 98% (2022 12:27) (98% - 99%)   (if applicable)    Chest Tube (if applicable)    HEENT: Head is normocephalic and atraumatic. Extraocular muscles are intact. Mucous membranes are moist.     NECK: Supple, no palpable adenopathy.    LUNGS: Fair b/l breath sounds with b/l rhonchi . No wheezing     HEART: Regular rate and rhythm without murmur.    ABDOMEN: Soft, nontender, and nondistended.  No hepatosplenomegaly is noted.    RENAL: No difficulty voiding, no pelvic pain    EXTREMITIES: Without any cyanosis, clubbing, rash, lesions or edema.    NEUROLOGIC: Awake, alert, oriented, grossly intact    SKIN: Warm, dry, good turgor.      LABS:                        13.7   17.02 )-----------( 229      ( 2022 06:41 )             40.3     04-24    137  |  106  |  23<H>  ----------------------------<  140<H>  4.3   |  24  |  1.19    Ca    9.5      2022 06:41  Phos  4.1       Mg     2.2         TPro  7.8  /  Alb  3.5  /  TBili  0.5  /  DBili  x   /  AST  15  /  ALT  24  /  AlkPhos  90                  Serum Pro-Brain Natriuretic Peptide: 81 pg/mL (22 @ 12:59)          MICROBIOLOGY: (if applicable)    RADIOLOGY & ADDITIONAL STUDIES:  EKG:   CXR:< from: Xray Chest 1 View-PORTABLE IMMEDIATE (22 @ 13:38) >    ACC: [id number] EXAM:  XR CHEST PORTABLE IMMED 1V                          PROCEDURE DATE:  2022          INTERPRETATION:  HISTORY: Dyspnea    TECHNIQUE: A single AP view of the chest was obtained.    COMPARISON: 10/17/2020    FINDINGS:  The cardiac silhouette is normal in size. There are no focal   consolidations or pleural effusions. The hilar and mediastinal structures   appear unremarkable. The osseous structures are intact.    IMPRESSION: Clear lungs.        --- End of Report ---            CATALINA WELSH MD; Attending Radiologist  This document has been electronically signed. 2022  2:42PM    < end of copied text >    ECHO:    IMPRESSION: 67y Female PAST MEDICAL & SURGICAL HISTORY:  HTN (hypertension)    HLD (hyperlipidemia)    Renal stone    Calculus of kidney with calculus of ureter    Pancolitis  treated in 10/2020    Intra-abdominal and pelvic swelling of mass or lump    Asthma    JUSTINA (obstructive sleep apnea)  severe, does not use an inhaler    Anemia    History of cystoscopy    History of  section    H/O unilateral oophorectomy    History of cystoscopy  bilateral ureteral stent placement, lithotripsy 2020  ureteral stents removed 10/2020     p/w           IMP:  This is a 67 yr old  woman from home, independent at baseline, Guinean speaking, with Asthma, HTN, HLD, Osteoporosis on alendronate p/w sob x3 days at rest and worsened by exertion. Pt notes that she has been treated with prednisone by her pcp last month for asthma which helped her breathing. Pat admitted for acute ex of Asthma , clinically improved .     Sugg;  - , D/C solumedrol, start Prednisone 20 mg daily taper over 7 days   - Resume her Advair 250 / 50 Q12h  - Albuterol inhaler 2 puff q6h   - Peak flow to chart BID  - DVT GI prophy   - D/C planning as per primary team   - Out pat pulmonary f/u with my office   - Advise compliance with meds

## 2022-04-25 ENCOUNTER — TRANSCRIPTION ENCOUNTER (OUTPATIENT)
Age: 67
End: 2022-04-25

## 2022-04-25 VITALS
HEART RATE: 70 BPM | SYSTOLIC BLOOD PRESSURE: 134 MMHG | DIASTOLIC BLOOD PRESSURE: 86 MMHG | TEMPERATURE: 98 F | OXYGEN SATURATION: 98 % | RESPIRATION RATE: 19 BRPM

## 2022-04-25 LAB
ALBUMIN SERPL ELPH-MCNC: 3.7 G/DL — SIGNIFICANT CHANGE UP (ref 3.5–5)
ALP SERPL-CCNC: 82 U/L — SIGNIFICANT CHANGE UP (ref 40–120)
ALT FLD-CCNC: 28 U/L DA — SIGNIFICANT CHANGE UP (ref 10–60)
ANION GAP SERPL CALC-SCNC: 10 MMOL/L — SIGNIFICANT CHANGE UP (ref 5–17)
AST SERPL-CCNC: 19 U/L — SIGNIFICANT CHANGE UP (ref 10–40)
BASOPHILS # BLD AUTO: 0.03 K/UL — SIGNIFICANT CHANGE UP (ref 0–0.2)
BASOPHILS NFR BLD AUTO: 0.1 % — SIGNIFICANT CHANGE UP (ref 0–2)
BILIRUB SERPL-MCNC: 0.3 MG/DL — SIGNIFICANT CHANGE UP (ref 0.2–1.2)
BUN SERPL-MCNC: 24 MG/DL — HIGH (ref 7–18)
CALCIUM SERPL-MCNC: 9.3 MG/DL — SIGNIFICANT CHANGE UP (ref 8.4–10.5)
CHLORIDE SERPL-SCNC: 106 MMOL/L — SIGNIFICANT CHANGE UP (ref 96–108)
CO2 SERPL-SCNC: 24 MMOL/L — SIGNIFICANT CHANGE UP (ref 22–31)
CREAT SERPL-MCNC: 1.08 MG/DL — SIGNIFICANT CHANGE UP (ref 0.5–1.3)
EGFR: 56 ML/MIN/1.73M2 — LOW
EOSINOPHIL # BLD AUTO: 0 K/UL — SIGNIFICANT CHANGE UP (ref 0–0.5)
EOSINOPHIL NFR BLD AUTO: 0 % — SIGNIFICANT CHANGE UP (ref 0–6)
GLUCOSE SERPL-MCNC: 126 MG/DL — HIGH (ref 70–99)
HCT VFR BLD CALC: 39.7 % — SIGNIFICANT CHANGE UP (ref 34.5–45)
HGB BLD-MCNC: 13.6 G/DL — SIGNIFICANT CHANGE UP (ref 11.5–15.5)
IMM GRANULOCYTES NFR BLD AUTO: 0.6 % — SIGNIFICANT CHANGE UP (ref 0–1.5)
LYMPHOCYTES # BLD AUTO: 1.62 K/UL — SIGNIFICANT CHANGE UP (ref 1–3.3)
LYMPHOCYTES # BLD AUTO: 7.3 % — LOW (ref 13–44)
MAGNESIUM SERPL-MCNC: 2.5 MG/DL — SIGNIFICANT CHANGE UP (ref 1.6–2.6)
MCHC RBC-ENTMCNC: 32.7 PG — SIGNIFICANT CHANGE UP (ref 27–34)
MCHC RBC-ENTMCNC: 34.3 GM/DL — SIGNIFICANT CHANGE UP (ref 32–36)
MCV RBC AUTO: 95.4 FL — SIGNIFICANT CHANGE UP (ref 80–100)
MONOCYTES # BLD AUTO: 1.07 K/UL — HIGH (ref 0–0.9)
MONOCYTES NFR BLD AUTO: 4.8 % — SIGNIFICANT CHANGE UP (ref 2–14)
NEUTROPHILS # BLD AUTO: 19.33 K/UL — HIGH (ref 1.8–7.4)
NEUTROPHILS NFR BLD AUTO: 87.2 % — HIGH (ref 43–77)
NRBC # BLD: 0 /100 WBCS — SIGNIFICANT CHANGE UP (ref 0–0)
PHOSPHATE SERPL-MCNC: 4.2 MG/DL — SIGNIFICANT CHANGE UP (ref 2.5–4.5)
PLATELET # BLD AUTO: 246 K/UL — SIGNIFICANT CHANGE UP (ref 150–400)
POTASSIUM SERPL-MCNC: 4 MMOL/L — SIGNIFICANT CHANGE UP (ref 3.5–5.3)
POTASSIUM SERPL-SCNC: 4 MMOL/L — SIGNIFICANT CHANGE UP (ref 3.5–5.3)
PROT SERPL-MCNC: 7.7 G/DL — SIGNIFICANT CHANGE UP (ref 6–8.3)
RBC # BLD: 4.16 M/UL — SIGNIFICANT CHANGE UP (ref 3.8–5.2)
RBC # FLD: 12.1 % — SIGNIFICANT CHANGE UP (ref 10.3–14.5)
SODIUM SERPL-SCNC: 140 MMOL/L — SIGNIFICANT CHANGE UP (ref 135–145)
WBC # BLD: 22.18 K/UL — HIGH (ref 3.8–10.5)
WBC # FLD AUTO: 22.18 K/UL — HIGH (ref 3.8–10.5)

## 2022-04-25 PROCEDURE — 71045 X-RAY EXAM CHEST 1 VIEW: CPT

## 2022-04-25 PROCEDURE — 0225U NFCT DS DNA&RNA 21 SARSCOV2: CPT

## 2022-04-25 PROCEDURE — 84100 ASSAY OF PHOSPHORUS: CPT

## 2022-04-25 PROCEDURE — 96374 THER/PROPH/DIAG INJ IV PUSH: CPT

## 2022-04-25 PROCEDURE — 84484 ASSAY OF TROPONIN QUANT: CPT

## 2022-04-25 PROCEDURE — G0378: CPT

## 2022-04-25 PROCEDURE — 83735 ASSAY OF MAGNESIUM: CPT

## 2022-04-25 PROCEDURE — 96375 TX/PRO/DX INJ NEW DRUG ADDON: CPT

## 2022-04-25 PROCEDURE — 93005 ELECTROCARDIOGRAM TRACING: CPT

## 2022-04-25 PROCEDURE — 36415 COLL VENOUS BLD VENIPUNCTURE: CPT

## 2022-04-25 PROCEDURE — 99285 EMERGENCY DEPT VISIT HI MDM: CPT

## 2022-04-25 PROCEDURE — 80053 COMPREHEN METABOLIC PANEL: CPT

## 2022-04-25 PROCEDURE — 83880 ASSAY OF NATRIURETIC PEPTIDE: CPT

## 2022-04-25 PROCEDURE — 94640 AIRWAY INHALATION TREATMENT: CPT

## 2022-04-25 PROCEDURE — 86703 HIV-1/HIV-2 1 RESULT ANTBDY: CPT

## 2022-04-25 PROCEDURE — 85025 COMPLETE CBC W/AUTO DIFF WBC: CPT

## 2022-04-25 PROCEDURE — 80048 BASIC METABOLIC PNL TOTAL CA: CPT

## 2022-04-25 PROCEDURE — 87040 BLOOD CULTURE FOR BACTERIA: CPT

## 2022-04-25 PROCEDURE — 83605 ASSAY OF LACTIC ACID: CPT

## 2022-04-25 RX ORDER — BUDESONIDE AND FORMOTEROL FUMARATE DIHYDRATE 160; 4.5 UG/1; UG/1
2 AEROSOL RESPIRATORY (INHALATION)
Qty: 1 | Refills: 0
Start: 2022-04-25 | End: 2022-05-24

## 2022-04-25 RX ORDER — ALBUTEROL 90 UG/1
2 AEROSOL, METERED ORAL
Qty: 1 | Refills: 0
Start: 2022-04-25 | End: 2022-05-24

## 2022-04-25 RX ADMIN — ALBUTEROL 2 PUFF(S): 90 AEROSOL, METERED ORAL at 09:32

## 2022-04-25 RX ADMIN — AMLODIPINE BESYLATE 5 MILLIGRAM(S): 2.5 TABLET ORAL at 06:02

## 2022-04-25 RX ADMIN — ENOXAPARIN SODIUM 40 MILLIGRAM(S): 100 INJECTION SUBCUTANEOUS at 06:02

## 2022-04-25 RX ADMIN — BUDESONIDE AND FORMOTEROL FUMARATE DIHYDRATE 2 PUFF(S): 160; 4.5 AEROSOL RESPIRATORY (INHALATION) at 09:33

## 2022-04-25 RX ADMIN — Medication 20 MILLIGRAM(S): at 06:03

## 2022-04-25 NOTE — DISCHARGE NOTE NURSING/CASE MANAGEMENT/SOCIAL WORK - NSDCPEFALRISK_GEN_ALL_CORE
For information on Fall & Injury Prevention, visit: [email or web address] OR  [email or web address] OR  https://www.cdc.gov/steadi/patient.html

## 2022-04-25 NOTE — PROGRESS NOTE ADULT - ASSESSMENT
67F from home, independent at baseline, Turkish speaking, with PMH Asthma, HTN, HLD, Osteoporosis on alendronate p/w sob x3 days admitted for asthma exacerbation     Problem/Plan - 1:  ·  Problem: Acute asthma exacerbation.   ·  Plan: p/w sob with diffuse wheezing s/p duoneb x2 and solumedrol 125x1 in ed  CXR negative  solumedrol 40q8, tapering  down .  symbicort + albuterol prn  may need HFA prescribed on discharged as patient does not like diskus inhaler  Peak flow meter  Pulm consulted .  Improving      Problem/Plan - 2:  ·  Problem: HLD (hyperlipidemia).   ·  Plan: c/w zocor.     Problem/Plan - 3:  ·  Problem: HTN (hypertension).   ·  Plan: c/w norvasc  monitor bp.     Problem/Plan - 4:  ·  Problem: Osteoporosis.   ·  Plan: takes alendronate 70 on tuesdays.     Problem/Plan - 5:  ·  Problem: Prophylactic measure.   ·  Plan: lovenox 40mg sq qd.    
67F from home, independent at baseline, Italian speaking, with PMH Asthma, HTN, HLD, Osteoporosis on alendronate p/w sob x3 days admitted for asthma exacerbation     Problem/Plan - 1:  ·  Problem: Acute asthma exacerbation.   ·  Plan: p/w sob with diffuse wheezing s/p duoneb x2 and solumedrol 125x1 in ed  CXR negative  solumedrol 40q8, changing to PO   symbicort + albuterol prn  may need HFA prescribed on discharged as patient does not like diskus inhaler  Peak flow meter  Pulm consulted .  Improving      Problem/Plan - 2:  ·  Problem: HLD (hyperlipidemia).   ·  Plan: c/w zocor.     Problem/Plan - 3:  ·  Problem: HTN (hypertension).   ·  Plan: c/w norvasc  monitor bp.     Problem/Plan - 4:  ·  Problem: Osteoporosis.   ·  Plan: takes alendronate 70 on tuesdays.     Problem/Plan - 5:  ·  Problem: Prophylactic measure.   ·  Plan: lovenox 40mg sq qd.

## 2022-04-25 NOTE — PROGRESS NOTE ADULT - SUBJECTIVE AND OBJECTIVE BOX
Date of Service  : 04-25-22   INTERVAL HPI/OVERNIGHT EVENTS: i feel fine so want to go home.   Vital Signs Last 24 Hrs  T(C): 36.7 (25 Apr 2022 05:31), Max: 36.7 (25 Apr 2022 05:31)  T(F): 98.1 (25 Apr 2022 05:31), Max: 98.1 (25 Apr 2022 05:31)  HR: 70 (25 Apr 2022 05:31) (70 - 79)  BP: 134/86 (25 Apr 2022 05:31) (125/81 - 134/86)  BP(mean): --  RR: 19 (25 Apr 2022 05:31) (18 - 19)  SpO2: 98% (25 Apr 2022 05:31) (94% - 98%)  I&O's Summary    MEDICATIONS  (STANDING):  ALBUTerol    90 MICROgram(s) HFA Inhaler 2 Puff(s) Inhalation every 6 hours  amLODIPine   Tablet 5 milliGRAM(s) Oral daily  budesonide 160 MICROgram(s)/formoterol 4.5 MICROgram(s) Inhaler 2 Puff(s) Inhalation two times a day  enoxaparin Injectable 40 milliGRAM(s) SubCutaneous every 24 hours  predniSONE   Tablet   Oral   predniSONE   Tablet 20 milliGRAM(s) Oral daily  simvastatin 40 milliGRAM(s) Oral at bedtime    MEDICATIONS  (PRN):  acetaminophen     Tablet .. 650 milliGRAM(s) Oral every 6 hours PRN Temp greater or equal to 38C (100.4F), Moderate Pain (4 - 6)    LABS:                        13.6   22.18 )-----------( 246      ( 25 Apr 2022 06:25 )             39.7     04-25    140  |  106  |  24<H>  ----------------------------<  126<H>  4.0   |  24  |  1.08    Ca    9.3      25 Apr 2022 06:25  Phos  4.2     04-25  Mg     2.5     04-25    TPro  7.7  /  Alb  3.7  /  TBili  0.3  /  DBili  x   /  AST  19  /  ALT  28  /  AlkPhos  82  04-25        CAPILLARY BLOOD GLUCOSE              REVIEW OF SYSTEMS:  CONSTITUTIONAL: No fever, weight loss, or fatigue  EYES: No eye pain, visual disturbances, or discharge  ENMT:  No difficulty hearing, tinnitus, vertigo; No sinus or throat pain  NECK: No pain or stiffness  RESPIRATORY: No cough, wheezing, chills or hemoptysis; No shortness of breath  CARDIOVASCULAR: No chest pain, palpitations, dizziness, or leg swelling  GASTROINTESTINAL: No abdominal or epigastric pain. No nausea, vomiting, or hematemesis; No diarrhea or constipation. No melena or hematochezia.  GENITOURINARY: No dysuria, frequency, hematuria, or incontinence  NEUROLOGICAL: No headaches, memory loss, loss of strength, numbness, or tremors      Consultant(s) Notes Reviewed:  [x ] YES  [ ] NO    PHYSICAL EXAM:  GENERAL: NAD, well-groomed, well-developed,not in any distress ,  HEAD:  Atraumatic, Normocephalic  NECK: Supple, No JVD, Normal thyroid  NERVOUS SYSTEM:  Alert & Oriented X3, No focal deficit   CHEST/LUNG: Good air entry bilateral with occ wheezing,  HEART: Regular rate and rhythm; No murmurs, rubs, or gallops  ABDOMEN: Soft, Nontender, Nondistended; Bowel sounds present  EXTREMITIES:  2+ Peripheral Pulses, No clubbing, cyanosis, or edema  SKIN: No rashes or lesions    Care Discussed with Consultants/Other Providers [ x] YES  [ ] NO
Date of Service  : 04-24-22     INTERVAL HPI/OVERNIGHT EVENTS: I feel much better.   Vital Signs Last 24 Hrs  T(C): 36.6 (24 Apr 2022 05:31), Max: 36.9 (23 Apr 2022 15:30)  T(F): 97.9 (24 Apr 2022 05:31), Max: 98.4 (23 Apr 2022 15:30)  HR: 94 (24 Apr 2022 05:31) (90 - 104)  BP: 122/83 (24 Apr 2022 05:31) (115/75 - 122/83)  BP(mean): 85 (23 Apr 2022 22:05) (84 - 85)  RR: 17 (24 Apr 2022 05:31) (17 - 18)  SpO2: 98% (24 Apr 2022 05:31) (96% - 99%)  I&O's Summary    MEDICATIONS  (STANDING):  ALBUTerol    90 MICROgram(s) HFA Inhaler 2 Puff(s) Inhalation every 6 hours  amLODIPine   Tablet 5 milliGRAM(s) Oral daily  budesonide 160 MICROgram(s)/formoterol 4.5 MICROgram(s) Inhaler 2 Puff(s) Inhalation two times a day  enoxaparin Injectable 40 milliGRAM(s) SubCutaneous every 24 hours  methylPREDNISolone sodium succinate Injectable 40 milliGRAM(s) IV Push every 8 hours  simvastatin 40 milliGRAM(s) Oral at bedtime    MEDICATIONS  (PRN):  acetaminophen     Tablet .. 650 milliGRAM(s) Oral every 6 hours PRN Temp greater or equal to 38C (100.4F), Moderate Pain (4 - 6)    LABS:                        13.7   17.02 )-----------( 229      ( 24 Apr 2022 06:41 )             40.3     04-24    137  |  106  |  23<H>  ----------------------------<  140<H>  4.3   |  24  |  1.19    Ca    9.5      24 Apr 2022 06:41  Phos  4.1     04-24  Mg     2.2     04-24    TPro  7.8  /  Alb  3.5  /  TBili  0.5  /  DBili  x   /  AST  15  /  ALT  24  /  AlkPhos  90  04-23        CAPILLARY BLOOD GLUCOSE              REVIEW OF SYSTEMS:  CONSTITUTIONAL: No fever, weight loss, or fatigue  EYES: No eye pain, visual disturbances, or discharge  ENMT:  No difficulty hearing, tinnitus, vertigo; No sinus or throat pain  NECK: No pain or stiffness  RESPIRATORY: No cough, wheezing, chills or hemoptysis; No shortness of breath  CARDIOVASCULAR: No chest pain, palpitations, dizziness, or leg swelling  GASTROINTESTINAL: No abdominal or epigastric pain. No nausea, vomiting, or hematemesis; No diarrhea or constipation. No melena or hematochezia.  GENITOURINARY: No dysuria, frequency, hematuria, or incontinence  NEUROLOGICAL: No headaches, memory loss, loss of strength, numbness, or tremors      Consultant(s) Notes Reviewed:  [x ] YES  [ ] NO    PHYSICAL EXAM:  GENERAL: NAD, well-groomed, well-developed, not in any distress ,  HEAD:  Atraumatic, Normocephalic  NECK: Supple, No JVD, Normal thyroid  NERVOUS SYSTEM:  Alert & Oriented X3, No focal deficit   CHEST/LUNG: Good air entry bilateral with occ wheezing,  HEART: Regular rate and rhythm; No murmurs, rubs, or gallops  ABDOMEN: Soft, Nontender, Nondistended; Bowel sounds present  EXTREMITIES:  2+ Peripheral Pulses, No clubbing, cyanosis, or edema    Care Discussed with Consultants/Other Providers [ x] YES  [ ] NO
Time of Visit:  Patient seen and examined.     MEDICATIONS  (STANDING):  ALBUTerol    90 MICROgram(s) HFA Inhaler 2 Puff(s) Inhalation every 6 hours  amLODIPine   Tablet 5 milliGRAM(s) Oral daily  budesonide 160 MICROgram(s)/formoterol 4.5 MICROgram(s) Inhaler 2 Puff(s) Inhalation two times a day  enoxaparin Injectable 40 milliGRAM(s) SubCutaneous every 24 hours  predniSONE   Tablet   Oral   predniSONE   Tablet 20 milliGRAM(s) Oral daily  simvastatin 40 milliGRAM(s) Oral at bedtime      MEDICATIONS  (PRN):  acetaminophen     Tablet .. 650 milliGRAM(s) Oral every 6 hours PRN Temp greater or equal to 38C (100.4F), Moderate Pain (4 - 6)       Medications up to date at time of exam.    ROS; No fever, chills, cough, congestion on exam. Denies SOB.  PHYSICAL EXAMINATION:  Vital Signs Last 24 Hrs  T(C): 36.7 (2022 05:31), Max: 36.8 (2022 12:27)  T(F): 98.1 (2022 05:31), Max: 98.3 (2022 12:27)  HR: 70 (2022 05:31) (70 - 101)  BP: 134/86 (2022 05:31) (116/66 - 134/86)  BP(mean): --  RR: 19 (2022 05:31) (18 - 19)  SpO2: 98% (2022 05:31) (94% - 98%)   (if applicable)    General: Alert and oriented. Able to answer question with no SOB. No acute distress.     HEENT: Head is normocephalic and atraumatic. No nasal tenderness . Extraocular muscles are intact. Mucous membranes are moist.     NECK: Supple, no palpable adenopathy.    LUNGS: Clear to auscultation bilaterally with no wheezing, rales, or rhonchi. No use of accessory muscle.     HEART: S1 S2 Regular rate and no click/ rub.     ABDOMEN: Soft, nontender, and nondistended. Active bowel sounds.     EXTREMITIES: Without any cyanosis, clubbing, rash, lesions or edema.    NEUROLOGIC: Awake, alert, oriented.     SKIN: Warm and moist. Non diaphoretic.       LABS:                        13.6   22.18 )-----------( 246      ( 2022 06:25 )             39.7     04    140  |  106  |  24<H>  ----------------------------<  126<H>  4.0   |  24  |  1.08    Ca    9.3      2022 06:25  Phos  4.2     0425  Mg     2.5     -    TPro  7.7  /  Alb  3.7  /  TBili  0.3  /  DBili  x   /  AST  19  /  ALT  28  /  AlkPhos  82  -                Serum Pro-Brain Natriuretic Peptide: 81 pg/mL (22 @ 12:59)          MICROBIOLOGY: (if applicable)    RADIOLOGY & ADDITIONAL STUDIES:  EKG:   CXR:  ECHO:    IMPRESSION: 67y Female PAST MEDICAL & SURGICAL HISTORY:  HTN (hypertension)    HLD (hyperlipidemia)    Renal stone    Calculus of kidney with calculus of ureter    Pancolitis  treated in 10/2020    Intra-abdominal and pelvic swelling of mass or lump    Asthma    JUSTINA (obstructive sleep apnea)  severe, does not use an inhaler    Anemia    History of cystoscopy    History of  section    H/O unilateral oophorectomy    History of cystoscopy  bilateral ureteral stent placement, lithotripsy 2020  ureteral stents removed 10/2020    Impression: This is a 68 Y/O Female presented in ED with SOB at rest x 3 Days and worsened by exertion. Pt notes that she has been treated with prednisone by her pcp last month for asthma which helped her breathing. Admitted for acute ex of Asthma , clinically improved .     Suggestion:  O2 saturation 98% room air. So far saturating good room air.    , D/C solumedrol, start Prednisone 20 mg daily taper over 7 days .   Resume her Advair 250 / 50 Q12h   Albuterol inhaler 2 puff q6h    Peak flow to chart BID  D/C planning as per primary team    Out patient pulmonary f/u with Dr Segura's office   Reinforced the importance of Daily compliance with meds .

## 2022-04-25 NOTE — DISCHARGE NOTE NURSING/CASE MANAGEMENT/SOCIAL WORK - PATIENT PORTAL LINK FT
You can access the FollowMyHealth Patient Portal offered by [facility] by registering at the following website: [email or web address]. By joining [employer]’s FollowMyHealth portal, you will also be able to view your health information using other applications (apps) compatible with our system.

## 2022-05-10 ENCOUNTER — APPOINTMENT (OUTPATIENT)
Dept: PULMONOLOGY | Facility: CLINIC | Age: 67
End: 2022-05-10
Payer: COMMERCIAL

## 2022-05-10 DIAGNOSIS — Z91.89 OTHER SPECIFIED PERSONAL RISK FACTORS, NOT ELSEWHERE CLASSIFIED: ICD-10-CM

## 2022-05-10 PROCEDURE — 94729 DIFFUSING CAPACITY: CPT

## 2022-05-10 PROCEDURE — 99214 OFFICE O/P EST MOD 30 MIN: CPT | Mod: 25

## 2022-05-10 PROCEDURE — 94060 EVALUATION OF WHEEZING: CPT

## 2022-05-10 PROCEDURE — 94726 PLETHYSMOGRAPHY LUNG VOLUMES: CPT

## 2022-05-11 PROBLEM — Z91.89 AT RISK FOR SLEEP APNEA: Status: ACTIVE | Noted: 2019-07-09

## 2022-05-11 NOTE — HISTORY OF PRESENT ILLNESS
[Former] : former [Never] : never [TextBox_4] : Patient is a 67-year-old female with past medical history significant for asthma who presents today for follow-up.  The patient complains of occasional cough shortness of breath and dyspnea on exertion.  She currently denies fevers chills chest pain weight loss or hemoptysis

## 2022-05-11 NOTE — REVIEW OF SYSTEMS
[Cough] : cough [Sputum] : no sputum [Wheezing] : wheezing [A.M. Dry Mouth] : a.m. dry mouth [SOB on Exertion] : sob on exertion [Negative] : Endocrine

## 2022-05-11 NOTE — ASSESSMENT
[FreeTextEntry1] : Patient is a 67-year-old female past medical history asthma today for follow-up.  Patient's physical exam is significant for Mallampati score of 3.  The patient does complain of nonrestorative sleep and morning headaches.  The patient is started on bronchodilator therapy and home sleep monitor has been ordered and she is instructed to follow-up in 2 weeks\par A pulmonary function test revealed normal lung volumes

## 2022-05-24 ENCOUNTER — APPOINTMENT (OUTPATIENT)
Dept: PULMONOLOGY | Facility: CLINIC | Age: 67
End: 2022-05-24
Payer: COMMERCIAL

## 2022-05-24 VITALS
WEIGHT: 164 LBS | TEMPERATURE: 98.5 F | BODY MASS INDEX: 30.18 KG/M2 | DIASTOLIC BLOOD PRESSURE: 87 MMHG | OXYGEN SATURATION: 96 % | SYSTOLIC BLOOD PRESSURE: 141 MMHG | HEIGHT: 62 IN | HEART RATE: 111 BPM

## 2022-05-24 DIAGNOSIS — I10 ESSENTIAL (PRIMARY) HYPERTENSION: ICD-10-CM

## 2022-05-24 PROCEDURE — 99214 OFFICE O/P EST MOD 30 MIN: CPT

## 2022-05-24 RX ORDER — BUDESONIDE, GLYCOPYRROLATE, AND FORMOTEROL FUMARATE 160; 9; 4.8 UG/1; UG/1; UG/1
160-9-4.8 AEROSOL, METERED RESPIRATORY (INHALATION) TWICE DAILY
Qty: 1 | Refills: 6 | Status: ACTIVE | COMMUNITY
Start: 2022-05-24 | End: 1900-01-01

## 2022-05-25 PROBLEM — I10 HYPERTENSION, ESSENTIAL: Status: ACTIVE | Noted: 2022-05-11

## 2022-05-25 RX ORDER — FLUTICASONE FUROATE, UMECLIDINIUM BROMIDE AND VILANTEROL TRIFENATATE 200; 62.5; 25 UG/1; UG/1; UG/1
200-62.5-25 POWDER RESPIRATORY (INHALATION) DAILY
Qty: 1 | Refills: 3 | Status: ACTIVE | COMMUNITY
Start: 2022-05-24 | End: 1900-01-01

## 2022-05-25 NOTE — ASSESSMENT
[FreeTextEntry1] : Patient is a 67-year-old female past medical history asthma today for follow-up.  Patient's physical exam is significant for Mallampati score of 3.  The patient does complain of nonrestorative sleep and morning headaches.  The patient is instructed to continue current medications including Breztri and to follow-up in 3 months

## 2022-05-25 NOTE — REVIEW OF SYSTEMS
Nursing Note by Aminata Rainey RN at 18 01:31 PM     Author:  Aminata Raniey RN Service:  (none) Author Type:  Registered Nurse     Filed:  18 01:31 PM Encounter Date:  3/5/2018 Status:  Signed     :  Aminata Rainey RN (Registered Nurse)            1:31 PM  Chief Complaint     Patient presents with     • Cough      most productive in a.m. green/yellow, not sleeping x 1 month     Patient brought to exam room.  Electronically Signed by:    Aminata Rainey RN , 3/5/2018  Patient's name,  and allergies verified with[MM1.1T] patient[MM1.1M].  Last visit with WEILER, KEITH E. was on No match found  Last visit with [facility] was on 2017 at  3:55 PM in [facility] SE[facility]  Match done based on reference date of today 3/5/18  Waleska Noel was offered treatment for pain control:[MM1.1T] No.   will wait for MD to evaluate.[MM1.1M]       Revision History        User Key Date/Time User Provider Type Action    > MM1.1 18 01:31 PM Aminata Rainey RN Registered Nurse Sign    M - Manual, T - Template             [Negative] : Endocrine

## 2022-05-25 NOTE — HISTORY OF PRESENT ILLNESS
[Former] : former [Never] : never [TextBox_4] : Patient is a 67-year-old female with past medical history significant for asthma hypertension who presents today for follow-up.  Patient states that her cough shortness of breath and dyspnea on exertion have improved with the use of her current medications.  She currently denies fevers chills chest pain weight loss or hemoptysis

## 2022-07-19 ENCOUNTER — APPOINTMENT (OUTPATIENT)
Dept: PULMONOLOGY | Facility: CLINIC | Age: 67
End: 2022-07-19

## 2022-07-19 VITALS
BODY MASS INDEX: 31.28 KG/M2 | RESPIRATION RATE: 12 BRPM | HEIGHT: 62 IN | OXYGEN SATURATION: 95 % | HEART RATE: 98 BPM | DIASTOLIC BLOOD PRESSURE: 90 MMHG | WEIGHT: 170 LBS | SYSTOLIC BLOOD PRESSURE: 135 MMHG | TEMPERATURE: 98 F

## 2022-07-19 DIAGNOSIS — Z78.9 OTHER SPECIFIED HEALTH STATUS: ICD-10-CM

## 2022-07-19 DIAGNOSIS — Z87.09 PERSONAL HISTORY OF OTHER DISEASES OF THE RESPIRATORY SYSTEM: ICD-10-CM

## 2022-07-19 DIAGNOSIS — Z86.39 PERSONAL HISTORY OF OTHER ENDOCRINE, NUTRITIONAL AND METABOLIC DISEASE: ICD-10-CM

## 2022-07-19 DIAGNOSIS — R05.3 CHRONIC COUGH: ICD-10-CM

## 2022-07-19 DIAGNOSIS — Z87.442 PERSONAL HISTORY OF URINARY CALCULI: ICD-10-CM

## 2022-07-19 DIAGNOSIS — Z84.1 FAMILY HISTORY OF DISORDERS OF KIDNEY AND URETER: ICD-10-CM

## 2022-07-19 DIAGNOSIS — Z87.891 PERSONAL HISTORY OF NICOTINE DEPENDENCE: ICD-10-CM

## 2022-07-19 DIAGNOSIS — J45.909 UNSPECIFIED ASTHMA, UNCOMPLICATED: ICD-10-CM

## 2022-07-19 DIAGNOSIS — N26.1 ATROPHY OF KIDNEY (TERMINAL): ICD-10-CM

## 2022-07-19 PROCEDURE — 99214 OFFICE O/P EST MOD 30 MIN: CPT

## 2022-07-19 RX ORDER — BUDESONIDE AND FORMOTEROL FUMARATE DIHYDRATE 160; 4.5 UG/1; UG/1
160-4.5 AEROSOL RESPIRATORY (INHALATION) TWICE DAILY
Qty: 1 | Refills: 3 | Status: ACTIVE | COMMUNITY
Start: 2022-07-19 | End: 1900-01-01

## 2022-07-19 NOTE — ASSESSMENT
[FreeTextEntry1] : Patient is a 67-year-old female past medical history asthma today for follow-up.  Patient's physical exam is significant for Mallampati score of 3.  The patient does complain of nonrestorative sleep and morning headaches.  The patient is instructed to continue current medications including Symbicort and to follow-up in 3 months

## 2022-07-19 NOTE — HISTORY OF PRESENT ILLNESS
[Former] : former [Never] : never [TextBox_4] : Patient is a 67-year-old female with past medical history significant for asthma history of chronic cough hypertension who presents today for follow-up.  Patient states that her cough shortness of breath and dyspnea on exertion have resolved with the use of her bronchodilator therapy.  She currently denies fevers chills chest pain weight loss or hemoptysis

## 2023-06-08 NOTE — ED PROVIDER NOTE - OBJECTIVE STATEMENT
65F, pmh of htn, kidney stones, presenting with abdominal pain. English 65F, pmh of htn, kidney stones, presenting with abdominal pain. patient had bilateral stents placed for her chronic kidney stones approximately three weeks ago. for the past 7 days she has had mild abdominal pain that has increased over the past three days and is associated with diarrhea and nausea. no vomiting, pain or burning with urination, fever, chest pain, shortness of breath, blood in urine or stool.

## 2024-04-01 NOTE — H&P PST ADULT - NEGATIVE GENERAL GENITOURINARY SYMPTOMS
Anesthesia Post-op Note    Patient: Luis Mejia  Procedure(s) Performed: EGD   Anesthesia type: MAC    Vitals Value Taken Time   Temp 36.6 °C (97.9 °F) 04/01/24 1220   Pulse 68 04/01/24 1220   Resp 14 04/01/24 1220   SpO2 93 % 04/01/24 1220   /72 04/01/24 1220         Patient Location: Phase II  Post-op Vital Signs:stable  Level of Consciousness: awake and alert  Respiratory Status: spontaneous ventilation  Cardiovascular stable  Hydration: euvolemic  Pain Management: adequately controlled  Handoff: Handoff to receiving clinician was performed and questions were answered  Vomiting: none  Nausea: None  Airway Patency:patent  Post-op Assessment: no complications and patient tolerated procedure well      No notable events documented.                       no hematuria/no incontinence/normal urinary frequency/no dysuria/no urine discoloration/no bladder infections

## 2024-05-08 NOTE — ASU PREOP CHECKLIST - DNR CLARIFICATION FORM COMPLETED
n/a

## 2024-06-04 NOTE — PATIENT PROFILE ADULT - OVER THE PAST TWO WEEKS, HAVE YOU FELT LITTLE INTEREST OR PLEASURE IN DOING THINGS?
For conscious sedation Pt. accepts awareness to sight, sound, touch, pressure and memory of procedure.
no

## 2024-12-19 NOTE — ED ADULT NURSE NOTE - SUICIDE SCREENING QUESTION 2
EVERETT finalized: 3/17/25 per LMP, 10-week US and ACOG     CF negative, NIPS negative.  AFP declined     COVID: Recommended, declines  Flu: Recommended, declines  Tdap:   RSV:       1hr Glucola: NA, diabetic  FU RPR w glucola:  ? Desires Sterilization:     Anatomy US: [facility] Dr. Fofana 10/18/2024 AC 78%, anterior placenta, variable presentation, suboptimal anatomic survey- FU [facility]  FU US: [facility] 24 breech, EFW 55%, AC 81%, anatomy within normal limits and completed  FU US: [facility] 24     PROBLEM LIST/PLAN:   Presumed pregest DM- see separate  AMA 33yo- NIPS neg, MFM consult for anatomy-completed wnl              Serial ultrasounds for growth at [facility] starting at 28 weeks  Weekly antepartum testing at 32 to 34 weeks  Delivery 39+  Short interval pregnancy - monitor for  labor     UDS positive for THC at new OB- pt stopped w positive preg test  
No